# Patient Record
Sex: MALE | Race: WHITE | HISPANIC OR LATINO | Employment: UNEMPLOYED | URBAN - METROPOLITAN AREA
[De-identification: names, ages, dates, MRNs, and addresses within clinical notes are randomized per-mention and may not be internally consistent; named-entity substitution may affect disease eponyms.]

---

## 2024-04-06 ENCOUNTER — APPOINTMENT (OUTPATIENT)
Dept: CT IMAGING | Facility: HOSPITAL | Age: 54
End: 2024-04-06

## 2024-04-06 ENCOUNTER — HOSPITAL ENCOUNTER (INPATIENT)
Facility: HOSPITAL | Age: 54
LOS: 7 days | Discharge: HOME | End: 2024-04-17
Attending: EMERGENCY MEDICINE | Admitting: HOSPITALIST

## 2024-04-06 ENCOUNTER — APPOINTMENT (OUTPATIENT)
Dept: GENERAL RADIOLOGY | Facility: HOSPITAL | Age: 54
End: 2024-04-06

## 2024-04-06 DIAGNOSIS — I63.9 CEREBELLAR STROKE: ICD-10-CM

## 2024-04-06 DIAGNOSIS — F10.920 ALCOHOLIC INTOXICATION WITHOUT COMPLICATION: Primary | ICD-10-CM

## 2024-04-06 DIAGNOSIS — E87.29 HIGH ANION GAP METABOLIC ACIDOSIS: ICD-10-CM

## 2024-04-06 DIAGNOSIS — R74.01 TRANSAMINITIS: ICD-10-CM

## 2024-04-06 LAB
ALBUMIN SERPL-MCNC: 4.2 G/DL (ref 3.5–5.2)
ALBUMIN/GLOB SERPL: 1.4 G/DL
ALP SERPL-CCNC: 123 U/L (ref 39–117)
ALT SERPL W P-5'-P-CCNC: 47 U/L (ref 1–41)
AMPHET+METHAMPHET UR QL: NEGATIVE
ANION GAP SERPL CALCULATED.3IONS-SCNC: 19 MMOL/L (ref 5–15)
APTT PPP: 20.7 SECONDS (ref 22.7–35.4)
AST SERPL-CCNC: 42 U/L (ref 1–40)
BARBITURATES UR QL SCN: NEGATIVE
BASOPHILS # BLD AUTO: 0.07 10*3/MM3 (ref 0–0.2)
BASOPHILS NFR BLD AUTO: 0.6 % (ref 0–1.5)
BENZODIAZ UR QL SCN: NEGATIVE
BILIRUB SERPL-MCNC: <0.2 MG/DL (ref 0–1.2)
BILIRUB UR QL STRIP: NEGATIVE
BUN SERPL-MCNC: 7 MG/DL (ref 6–20)
BUN/CREAT SERPL: 9.3 (ref 7–25)
CALCIUM SPEC-SCNC: 8.1 MG/DL (ref 8.6–10.5)
CANNABINOIDS SERPL QL: NEGATIVE
CHLORIDE SERPL-SCNC: 104 MMOL/L (ref 98–107)
CLARITY UR: CLEAR
CO2 SERPL-SCNC: 16 MMOL/L (ref 22–29)
COCAINE UR QL: NEGATIVE
COLOR UR: YELLOW
CREAT SERPL-MCNC: 0.75 MG/DL (ref 0.76–1.27)
DEPRECATED RDW RBC AUTO: 43.4 FL (ref 37–54)
EGFRCR SERPLBLD CKD-EPI 2021: 111.3 ML/MIN/1.73
EOSINOPHIL # BLD AUTO: 0.26 10*3/MM3 (ref 0–0.4)
EOSINOPHIL NFR BLD AUTO: 2.4 % (ref 0.3–6.2)
ERYTHROCYTE [DISTWIDTH] IN BLOOD BY AUTOMATED COUNT: 12.5 % (ref 12.3–15.4)
ETHANOL BLD-MCNC: 170 MG/DL (ref 0–10)
ETHANOL UR QL: 0.17 %
FENTANYL UR-MCNC: NEGATIVE NG/ML
GLOBULIN UR ELPH-MCNC: 2.9 GM/DL
GLUCOSE SERPL-MCNC: 161 MG/DL (ref 65–99)
GLUCOSE UR STRIP-MCNC: NEGATIVE MG/DL
HCT VFR BLD AUTO: 42.4 % (ref 37.5–51)
HGB BLD-MCNC: 14.4 G/DL (ref 13–17.7)
HGB UR QL STRIP.AUTO: NEGATIVE
IMM GRANULOCYTES # BLD AUTO: 0.12 10*3/MM3 (ref 0–0.05)
IMM GRANULOCYTES NFR BLD AUTO: 1.1 % (ref 0–0.5)
INR PPP: 1.01 (ref 0.9–1.1)
KETONES UR QL STRIP: NEGATIVE
LEUKOCYTE ESTERASE UR QL STRIP.AUTO: NEGATIVE
LYMPHOCYTES # BLD AUTO: 4.21 10*3/MM3 (ref 0.7–3.1)
LYMPHOCYTES NFR BLD AUTO: 38.5 % (ref 19.6–45.3)
MCH RBC QN AUTO: 32.4 PG (ref 26.6–33)
MCHC RBC AUTO-ENTMCNC: 34 G/DL (ref 31.5–35.7)
MCV RBC AUTO: 95.5 FL (ref 79–97)
METHADONE UR QL SCN: NEGATIVE
MONOCYTES # BLD AUTO: 1.2 10*3/MM3 (ref 0.1–0.9)
MONOCYTES NFR BLD AUTO: 11 % (ref 5–12)
NEUTROPHILS NFR BLD AUTO: 46.4 % (ref 42.7–76)
NEUTROPHILS NFR BLD AUTO: 5.08 10*3/MM3 (ref 1.7–7)
NITRITE UR QL STRIP: NEGATIVE
NRBC BLD AUTO-RTO: 0 /100 WBC (ref 0–0.2)
OPIATES UR QL: NEGATIVE
OXYCODONE UR QL SCN: NEGATIVE
PH UR STRIP.AUTO: <=5 [PH] (ref 5–8)
PLATELET # BLD AUTO: 311 10*3/MM3 (ref 140–450)
PMV BLD AUTO: 8.9 FL (ref 6–12)
POTASSIUM SERPL-SCNC: 3.6 MMOL/L (ref 3.5–5.2)
PROT SERPL-MCNC: 7.1 G/DL (ref 6–8.5)
PROT UR QL STRIP: NEGATIVE
PROTHROMBIN TIME: 13.5 SECONDS (ref 11.7–14.2)
RBC # BLD AUTO: 4.44 10*6/MM3 (ref 4.14–5.8)
SODIUM SERPL-SCNC: 139 MMOL/L (ref 136–145)
SP GR UR STRIP: 1.01 (ref 1–1.03)
UROBILINOGEN UR QL STRIP: NORMAL
WBC NRBC COR # BLD AUTO: 10.94 10*3/MM3 (ref 3.4–10.8)

## 2024-04-06 PROCEDURE — 25010000002 DROPERIDOL PER 5 MG: Performed by: PHYSICIAN ASSISTANT

## 2024-04-06 PROCEDURE — 80053 COMPREHEN METABOLIC PANEL: CPT | Performed by: PHYSICIAN ASSISTANT

## 2024-04-06 PROCEDURE — 85610 PROTHROMBIN TIME: CPT | Performed by: PHYSICIAN ASSISTANT

## 2024-04-06 PROCEDURE — 99285 EMERGENCY DEPT VISIT HI MDM: CPT

## 2024-04-06 PROCEDURE — 80307 DRUG TEST PRSMV CHEM ANLYZR: CPT | Performed by: PHYSICIAN ASSISTANT

## 2024-04-06 PROCEDURE — 25010000002 ONDANSETRON PER 1 MG: Performed by: PHYSICIAN ASSISTANT

## 2024-04-06 PROCEDURE — 82077 ASSAY SPEC XCP UR&BREATH IA: CPT | Performed by: PHYSICIAN ASSISTANT

## 2024-04-06 PROCEDURE — 85730 THROMBOPLASTIN TIME PARTIAL: CPT | Performed by: PHYSICIAN ASSISTANT

## 2024-04-06 PROCEDURE — 70450 CT HEAD/BRAIN W/O DYE: CPT

## 2024-04-06 PROCEDURE — 81003 URINALYSIS AUTO W/O SCOPE: CPT | Performed by: PHYSICIAN ASSISTANT

## 2024-04-06 PROCEDURE — 25010000002 THIAMINE PER 100 MG: Performed by: PHYSICIAN ASSISTANT

## 2024-04-06 PROCEDURE — 25810000003 LACTATED RINGERS SOLUTION: Performed by: PHYSICIAN ASSISTANT

## 2024-04-06 PROCEDURE — 25010000002 ONDANSETRON PER 1 MG

## 2024-04-06 PROCEDURE — 82550 ASSAY OF CK (CPK): CPT | Performed by: PHYSICIAN ASSISTANT

## 2024-04-06 PROCEDURE — 84145 PROCALCITONIN (PCT): CPT | Performed by: EMERGENCY MEDICINE

## 2024-04-06 PROCEDURE — 71045 X-RAY EXAM CHEST 1 VIEW: CPT

## 2024-04-06 PROCEDURE — P9612 CATHETERIZE FOR URINE SPEC: HCPCS

## 2024-04-06 PROCEDURE — 85025 COMPLETE CBC W/AUTO DIFF WBC: CPT | Performed by: PHYSICIAN ASSISTANT

## 2024-04-06 RX ORDER — ONDANSETRON 2 MG/ML
4 INJECTION INTRAMUSCULAR; INTRAVENOUS ONCE
Status: COMPLETED | OUTPATIENT
Start: 2024-04-06 | End: 2024-04-06

## 2024-04-06 RX ORDER — ONDANSETRON 2 MG/ML
INJECTION INTRAMUSCULAR; INTRAVENOUS
Status: COMPLETED
Start: 2024-04-06 | End: 2024-04-06

## 2024-04-06 RX ORDER — THIAMINE HYDROCHLORIDE 100 MG/ML
100 INJECTION, SOLUTION INTRAMUSCULAR; INTRAVENOUS ONCE
Status: COMPLETED | OUTPATIENT
Start: 2024-04-06 | End: 2024-04-06

## 2024-04-06 RX ORDER — DROPERIDOL 2.5 MG/ML
1.25 INJECTION, SOLUTION INTRAMUSCULAR; INTRAVENOUS ONCE
Status: COMPLETED | OUTPATIENT
Start: 2024-04-06 | End: 2024-04-06

## 2024-04-06 RX ADMIN — FOLIC ACID 1 MG: 5 INJECTION, SOLUTION INTRAMUSCULAR; INTRAVENOUS; SUBCUTANEOUS at 22:23

## 2024-04-06 RX ADMIN — ONDANSETRON 4 MG: 2 INJECTION INTRAMUSCULAR; INTRAVENOUS at 22:16

## 2024-04-06 RX ADMIN — ONDANSETRON 4 MG: 2 INJECTION INTRAMUSCULAR; INTRAVENOUS at 21:34

## 2024-04-06 RX ADMIN — SODIUM CHLORIDE, POTASSIUM CHLORIDE, SODIUM LACTATE AND CALCIUM CHLORIDE 1000 ML: 600; 310; 30; 20 INJECTION, SOLUTION INTRAVENOUS at 21:38

## 2024-04-06 RX ADMIN — DROPERIDOL 1.25 MG: 2.5 INJECTION, SOLUTION INTRAMUSCULAR; INTRAVENOUS at 22:40

## 2024-04-06 RX ADMIN — THIAMINE HYDROCHLORIDE 100 MG: 100 INJECTION, SOLUTION INTRAMUSCULAR; INTRAVENOUS at 22:45

## 2024-04-07 PROBLEM — R74.01 TRANSAMINITIS: Status: ACTIVE | Noted: 2024-04-07

## 2024-04-07 PROBLEM — F10.929 ALCOHOL INTOXICATION: Status: ACTIVE | Noted: 2024-04-07

## 2024-04-07 LAB
CK SERPL-CCNC: 122 U/L (ref 20–200)
D-LACTATE SERPL-SCNC: 2.3 MMOL/L (ref 0.5–2)
D-LACTATE SERPL-SCNC: 3 MMOL/L (ref 0.5–2)
D-LACTATE SERPL-SCNC: 3.2 MMOL/L (ref 0.5–2)
D-LACTATE SERPL-SCNC: 3.8 MMOL/L (ref 0.5–2)
D-LACTATE SERPL-SCNC: 3.9 MMOL/L (ref 0.5–2)
PROCALCITONIN SERPL-MCNC: 0.06 NG/ML (ref 0–0.25)

## 2024-04-07 PROCEDURE — G0378 HOSPITAL OBSERVATION PER HR: HCPCS

## 2024-04-07 PROCEDURE — 36415 COLL VENOUS BLD VENIPUNCTURE: CPT | Performed by: EMERGENCY MEDICINE

## 2024-04-07 PROCEDURE — 25810000003 SODIUM CHLORIDE 0.9 % SOLUTION: Performed by: PHYSICIAN ASSISTANT

## 2024-04-07 PROCEDURE — 83605 ASSAY OF LACTIC ACID: CPT | Performed by: EMERGENCY MEDICINE

## 2024-04-07 PROCEDURE — 25010000002 VANCOMYCIN 10 G RECONSTITUTED SOLUTION: Performed by: EMERGENCY MEDICINE

## 2024-04-07 PROCEDURE — 25810000003 SODIUM CHLORIDE 0.9 % SOLUTION: Performed by: EMERGENCY MEDICINE

## 2024-04-07 PROCEDURE — 25010000002 CEFTRIAXONE PER 250 MG: Performed by: EMERGENCY MEDICINE

## 2024-04-07 PROCEDURE — 25810000003 LACTATED RINGERS PER 1000 ML: Performed by: NURSE PRACTITIONER

## 2024-04-07 PROCEDURE — 25010000002 THIAMINE PER 100 MG: Performed by: NURSE PRACTITIONER

## 2024-04-07 PROCEDURE — 25810000003 LACTATED RINGERS SOLUTION: Performed by: NURSE PRACTITIONER

## 2024-04-07 PROCEDURE — 36415 COLL VENOUS BLD VENIPUNCTURE: CPT

## 2024-04-07 PROCEDURE — 25010000002 THIAMINE HCL 200 MG/2ML SOLUTION: Performed by: NURSE PRACTITIONER

## 2024-04-07 RX ORDER — BISACODYL 10 MG
10 SUPPOSITORY, RECTAL RECTAL DAILY PRN
Status: DISCONTINUED | OUTPATIENT
Start: 2024-04-07 | End: 2024-04-17 | Stop reason: HOSPADM

## 2024-04-07 RX ORDER — ALUMINA, MAGNESIA, AND SIMETHICONE 2400; 2400; 240 MG/30ML; MG/30ML; MG/30ML
15 SUSPENSION ORAL EVERY 6 HOURS PRN
Status: DISCONTINUED | OUTPATIENT
Start: 2024-04-07 | End: 2024-04-17 | Stop reason: HOSPADM

## 2024-04-07 RX ORDER — POLYETHYLENE GLYCOL 3350 17 G/17G
17 POWDER, FOR SOLUTION ORAL DAILY PRN
Status: DISCONTINUED | OUTPATIENT
Start: 2024-04-07 | End: 2024-04-17 | Stop reason: HOSPADM

## 2024-04-07 RX ORDER — LORAZEPAM 2 MG/ML
1 INJECTION INTRAMUSCULAR
Status: DISCONTINUED | OUTPATIENT
Start: 2024-04-07 | End: 2024-04-09

## 2024-04-07 RX ORDER — CLONIDINE HYDROCHLORIDE 0.1 MG/1
0.1 TABLET ORAL EVERY 4 HOURS PRN
Status: DISCONTINUED | OUTPATIENT
Start: 2024-04-07 | End: 2024-04-17 | Stop reason: HOSPADM

## 2024-04-07 RX ORDER — THIAMINE HYDROCHLORIDE 100 MG/ML
200 INJECTION, SOLUTION INTRAMUSCULAR; INTRAVENOUS EVERY 8 HOURS SCHEDULED
Status: DISPENSED | OUTPATIENT
Start: 2024-04-07 | End: 2024-04-12

## 2024-04-07 RX ORDER — LORAZEPAM 1 MG/1
1 TABLET ORAL
Status: DISCONTINUED | OUTPATIENT
Start: 2024-04-07 | End: 2024-04-09

## 2024-04-07 RX ORDER — LORAZEPAM 1 MG/1
2 TABLET ORAL
Status: DISCONTINUED | OUTPATIENT
Start: 2024-04-07 | End: 2024-04-09

## 2024-04-07 RX ORDER — ONDANSETRON 4 MG/1
4 TABLET, ORALLY DISINTEGRATING ORAL EVERY 6 HOURS PRN
Status: DISCONTINUED | OUTPATIENT
Start: 2024-04-07 | End: 2024-04-17 | Stop reason: HOSPADM

## 2024-04-07 RX ORDER — LORAZEPAM 2 MG/ML
2 INJECTION INTRAMUSCULAR
Status: DISCONTINUED | OUTPATIENT
Start: 2024-04-07 | End: 2024-04-09

## 2024-04-07 RX ORDER — ONDANSETRON 2 MG/ML
4 INJECTION INTRAMUSCULAR; INTRAVENOUS EVERY 6 HOURS PRN
Status: DISCONTINUED | OUTPATIENT
Start: 2024-04-07 | End: 2024-04-17 | Stop reason: HOSPADM

## 2024-04-07 RX ORDER — NITROGLYCERIN 0.4 MG/1
0.4 TABLET SUBLINGUAL
Status: DISCONTINUED | OUTPATIENT
Start: 2024-04-07 | End: 2024-04-17 | Stop reason: HOSPADM

## 2024-04-07 RX ORDER — FOLIC ACID 1 MG/1
1 TABLET ORAL DAILY
Status: DISCONTINUED | OUTPATIENT
Start: 2024-04-07 | End: 2024-04-17 | Stop reason: HOSPADM

## 2024-04-07 RX ORDER — AMOXICILLIN 250 MG
2 CAPSULE ORAL 2 TIMES DAILY PRN
Status: DISCONTINUED | OUTPATIENT
Start: 2024-04-07 | End: 2024-04-17 | Stop reason: HOSPADM

## 2024-04-07 RX ORDER — BISACODYL 5 MG/1
5 TABLET, DELAYED RELEASE ORAL DAILY PRN
Status: DISCONTINUED | OUTPATIENT
Start: 2024-04-07 | End: 2024-04-17 | Stop reason: HOSPADM

## 2024-04-07 RX ORDER — SODIUM CHLORIDE 0.9 % (FLUSH) 0.9 %
10 SYRINGE (ML) INJECTION AS NEEDED
Status: DISCONTINUED | OUTPATIENT
Start: 2024-04-07 | End: 2024-04-17 | Stop reason: HOSPADM

## 2024-04-07 RX ORDER — MULTIPLE VITAMINS W/ MINERALS TAB 9MG-400MCG
1 TAB ORAL DAILY
Status: DISCONTINUED | OUTPATIENT
Start: 2024-04-07 | End: 2024-04-17 | Stop reason: HOSPADM

## 2024-04-07 RX ORDER — SODIUM CHLORIDE, SODIUM LACTATE, POTASSIUM CHLORIDE, CALCIUM CHLORIDE 600; 310; 30; 20 MG/100ML; MG/100ML; MG/100ML; MG/100ML
75 INJECTION, SOLUTION INTRAVENOUS CONTINUOUS
Status: DISCONTINUED | OUTPATIENT
Start: 2024-04-07 | End: 2024-04-10

## 2024-04-07 RX ADMIN — THIAMINE HYDROCHLORIDE 200 MG: 100 INJECTION, SOLUTION INTRAMUSCULAR; INTRAVENOUS at 16:09

## 2024-04-07 RX ADMIN — CEFTRIAXONE 2000 MG: 2 INJECTION, POWDER, FOR SOLUTION INTRAMUSCULAR; INTRAVENOUS at 05:12

## 2024-04-07 RX ADMIN — SODIUM CHLORIDE, POTASSIUM CHLORIDE, SODIUM LACTATE AND CALCIUM CHLORIDE 100 ML/HR: 600; 310; 30; 20 INJECTION, SOLUTION INTRAVENOUS at 09:50

## 2024-04-07 RX ADMIN — SODIUM CHLORIDE, POTASSIUM CHLORIDE, SODIUM LACTATE AND CALCIUM CHLORIDE 500 ML: 600; 310; 30; 20 INJECTION, SOLUTION INTRAVENOUS at 22:15

## 2024-04-07 RX ADMIN — THIAMINE HYDROCHLORIDE 200 MG: 100 INJECTION, SOLUTION INTRAMUSCULAR; INTRAVENOUS at 22:00

## 2024-04-07 RX ADMIN — VANCOMYCIN HYDROCHLORIDE 1750 MG: 10 INJECTION, POWDER, LYOPHILIZED, FOR SOLUTION INTRAVENOUS at 05:49

## 2024-04-07 RX ADMIN — SODIUM CHLORIDE 1000 ML: 9 INJECTION, SOLUTION INTRAVENOUS at 05:57

## 2024-04-07 NOTE — ED NOTES
Nursing report ED to floor  Stephane Jaimes  53 y.o.  male    HPI :  HPI (Adult)  Stated Reason for Visit: Alcohol intoxication. EMS reports pt was found unresponsive in his car in a parking lot. EMS reports 2 mg NArcan given at 20:49 PTA.  History Obtained From: EMS  Precipitating Event(s): alcohol ingestion    Chief Complaint  Chief Complaint   Patient presents with    Alcohol Intoxication       Admitting doctor:   Warren Bajwa MD    Admitting diagnosis:   The primary encounter diagnosis was Alcoholic intoxication without complication. Diagnoses of Transaminitis and High anion gap metabolic acidosis were also pertinent to this visit.    Code status:   Current Code Status       Date Active Code Status Order ID Comments User Context       Not on file            Allergies:   Patient has no known allergies.    Isolation:   No active isolations    Intake and Output  No intake or output data in the 24 hours ending 04/07/24 0514    Weight:       04/06/24 2119   Weight: 92.5 kg (204 lb)       Most recent vitals:   Vitals:    04/07/24 0101 04/07/24 0303 04/07/24 0331 04/07/24 0430   BP: 130/75 157/91 151/97 159/99   BP Location:    Right arm   Patient Position:    Lying   Pulse: 69 75 75 76   Resp:    18   Temp:       TempSrc:       SpO2: 98% 100% 98% 98%   Weight:       Height:           Active LDAs/IV Access:   Lines, Drains & Airways       Active LDAs       Name Placement date Placement time Site Days    Peripheral IV 04/06/24 2129 Anterior;Distal;Left;Upper Arm 04/06/24 2129  Arm  less than 1    Peripheral IV 04/06/24 2135 Right Antecubital 04/06/24 2135  Antecubital  less than 1                    Labs (abnormal labs have a star):   Labs Reviewed   COMPREHENSIVE METABOLIC PANEL - Abnormal; Notable for the following components:       Result Value    Glucose 161 (*)     Creatinine 0.75 (*)     CO2 16.0 (*)     Calcium 8.1 (*)     ALT (SGPT) 47 (*)     AST (SGOT) 42 (*)     Alkaline Phosphatase 123 (*)     Anion Gap  19.0 (*)     All other components within normal limits    Narrative:     GFR Normal >60  Chronic Kidney Disease <60  Kidney Failure <15     APTT - Abnormal; Notable for the following components:    PTT 20.7 (*)     All other components within normal limits    Narrative:     No clot detected.     ETHANOL - Abnormal; Notable for the following components:    Ethanol 170 (*)     All other components within normal limits   CBC WITH AUTO DIFFERENTIAL - Abnormal; Notable for the following components:    WBC 10.94 (*)     Immature Grans % 1.1 (*)     Lymphocytes, Absolute 4.21 (*)     Monocytes, Absolute 1.20 (*)     Immature Grans, Absolute 0.12 (*)     All other components within normal limits   PROTIME-INR - Normal   URINALYSIS W/ MICROSCOPIC IF INDICATED (NO CULTURE) - Normal    Narrative:     Urine microscopic not indicated.   URINE DRUG SCREEN - Normal    Narrative:     Negative Thresholds Per Drugs Screened:    Amphetamines                 500 ng/ml  Barbiturates                 200 ng/ml  Benzodiazepines              100 ng/ml  Cocaine                      300 ng/ml  Methadone                    300 ng/ml  Opiates                      300 ng/ml  Oxycodone                    100 ng/ml  THC                           50 ng/ml  Fentanyl                       5 ng/ml      The Normal Value for all drugs tested is negative. This report includes final unconfirmed screening results to be used for medical treatment purposes only. Unconfirmed results must not be used for non-medical purposes such as employment or legal testing. Clinical consideration should be applied to any drug of abuse test, particularly when unconfirmed results are used.           PROCALCITONIN - Normal    Narrative:     As a Marker for Sepsis (Non-Neonates):    1. <0.5 ng/mL represents a low risk of severe sepsis and/or septic shock.  2. >2 ng/mL represents a high risk of severe sepsis and/or septic shock.    As a Marker for Lower Respiratory Tract  "Infections that require antibiotic therapy:    PCT on Admission    Antibiotic Therapy       6-12 Hrs later    >0.5                Strongly Recommended  >0.25 - <0.5        Recommended   0.1 - 0.25          Discouraged              Remeasure/reassess PCT  <0.1                Strongly Discouraged     Remeasure/reassess PCT    As 28 day mortality risk marker: \"Change in Procalcitonin Result\" (>80% or <=80%) if Day 0 (or Day 1) and Day 4 values are available. Refer to http://www.[facility]-pct-calculator.com    Change in PCT <=80%  A decrease of PCT levels below or equal to 80% defines a positive change in PCT test result representing a higher risk for 28-day all-cause mortality of patients diagnosed with severe sepsis for septic shock.    Change in PCT >80%  A decrease of PCT levels of more than 80% defines a negative change in PCT result representing a lower risk for 28-day all-cause mortality of patients diagnosed with severe sepsis or septic shock.      LACTIC ACID, PLASMA   CBC AND DIFFERENTIAL    Narrative:     The following orders were created for panel order CBC & Differential.  Procedure                               Abnormality         Status                     ---------                               -----------         ------                     CBC Auto Differential[[id number]]        Abnormal            Final result                 Please view results for these tests on the individual orders.       EKG:   No orders to display       Meds given in ED:   Medications   cefTRIAXone (ROCEPHIN) 2,000 mg in sodium chloride 0.9 % 100 mL MBP (2,000 mg Intravenous New Bag 4/7/24 0512)   vancomycin 1750 mg/500 mL 0.9% NS IVPB ([facility]) (has no administration in time range)   ondansetron (ZOFRAN) injection 4 mg (4 mg Intravenous Not Given 4/6/24 6594)   lactated ringers bolus 1,000 mL (0 mL Intravenous Stopped 4/6/24 2301)   thiamine (B-1) injection 100 mg (100 mg Intravenous Given 4/6/24 2245)   folic acid 1 mg in sodium " chloride 0.9 % 50 mL IVPB (0 mg Intravenous Stopped 4/6/24 2301)   ondansetron (ZOFRAN) injection 4 mg (4 mg Intravenous Given 4/6/24 2216)   droperidol (INAPSINE) injection 1.25 mg (1.25 mg Intravenous Given 4/6/24 2240)       Imaging results:  CT Head Without Contrast    Result Date: 4/6/2024  Electronically signed by Alena Mitchell M.D. on 04-06-24 at 2307    XR Chest 1 View    Result Date: 4/6/2024  FINDINGS AND IMPRESSION: Lungs are hypoinflated. Pulmonary vascular congestion is present. Cardiac silhouette is accentuated by low lung volumes. There is bibasilar pulmonary opacification suggestive of atelectasis, pneumonia and/or pulmonary edema in the appropriate clinical context correlation with patient history is recommended follow-up chest CT if clinically indicated. No pneumothorax is seen..  This report was finalized on 4/6/2024 10:59 PM by Dr. Stephen De La Cruz M.D on Workstation: [id number]       Ambulatory status:   - assist Xs 2    Social issues:   Social History     Socioeconomic History    Marital status: Single       Peripheral Neurovascular  Peripheral Neurovascular (Adult)  Peripheral Neurovascular WDL: WDL, pulse assessment  Pulse Assessment: radial    Neuro Cognitive  Neuro Cognitive (Adult)  Cognitive/Neuro/Behavioral WDL: .WDL except, arousability, level of consciousness, orientation  Level of Consciousness: Responds to Pain  Arousal Level: arouses to vigorous stimulation  Orientation: disoriented x 4  Additional Documentation: Hand /Ankle Strength (Group)  Hand /Ankle Strength  Hand , Left: strong  Hand , Right: strong  Dorsiflexion, Left: strong  Dorsiflexion, Right: strong  Plantarflexion, Left: strong  Plantarflexion, Right: strong    Learning  Learning Assessment (Adult)  Learning Readiness and Ability: cognitive limitation noted, language barrier identified    Respiratory  Respiratory (Adult)  Airway WDL: WDL  Respiratory WDL  Respiratory WDL: WDL  Rhythm/Pattern,  Respiratory: no shortness of breath reported, depth regular, pattern regular, unlabored    Abdominal Pain       Pain Assessments  Pain (Adult)  Preferred Pain Scale: FACES (Castillo-Baker FACES Pain Rating Scale)  FACES Pain Rating: Rest: 0-->no hurt  FACES Pain Rating: Activity: 0-->no hurt    NIH Stroke Scale       Patricia Araujo RN  04/07/24 05:14 EDT
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
## 2024-04-07 NOTE — ED TRIAGE NOTES
EMS reports pt was found unresponsive in his car in a parking lot. EMS reports 2 mg Narcan given at 20:49 PTA. EMS reports pt may have aspirated emesis.

## 2024-04-07 NOTE — NURSING NOTE
Access Center attempted to complete evaluation. The patient's nurse reported the patient is not appropriate at this time. The patient's nurse reported the patient is drowsy/lethargic and is only able to be aroused briefly before falling back asleep. Access center will try back later.
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
## 2024-04-07 NOTE — NURSING NOTE
Access Center checked back on patient. The patient's nurse reported the patient is still not appropriate and to check back tomorrow.

## 2024-04-07 NOTE — ED NOTES
Patient started to vomit and his girlfriend was afraid that the patient was choking and/or having a stroke. HUNTER Ansari, at bedside. Staff Assist button pushed and multiple staff responded. Suction was available. Patient did vomit a small amount. No decrease in patient's SpO2. Patient did have a difficult time staying awake.

## 2024-04-08 PROBLEM — I10 HTN (HYPERTENSION): Status: ACTIVE | Noted: 2024-04-08

## 2024-04-08 LAB
AMMONIA BLD-SCNC: 40 UMOL/L (ref 16–60)
ANION GAP SERPL CALCULATED.3IONS-SCNC: 11 MMOL/L (ref 5–15)
B PARAPERT DNA SPEC QL NAA+PROBE: NOT DETECTED
B PERT DNA SPEC QL NAA+PROBE: NOT DETECTED
BASOPHILS # BLD MANUAL: 0.24 10*3/MM3 (ref 0–0.2)
BASOPHILS NFR BLD MANUAL: 2 % (ref 0–1.5)
BUN SERPL-MCNC: 8 MG/DL (ref 6–20)
BUN/CREAT SERPL: 11 (ref 7–25)
C PNEUM DNA NPH QL NAA+NON-PROBE: NOT DETECTED
CALCIUM SPEC-SCNC: 8.2 MG/DL (ref 8.6–10.5)
CHLORIDE SERPL-SCNC: 102 MMOL/L (ref 98–107)
CO2 SERPL-SCNC: 23 MMOL/L (ref 22–29)
CREAT SERPL-MCNC: 0.73 MG/DL (ref 0.76–1.27)
D-LACTATE SERPL-SCNC: 2.2 MMOL/L (ref 0.5–2)
DEPRECATED RDW RBC AUTO: 47.7 FL (ref 37–54)
EGFRCR SERPLBLD CKD-EPI 2021: 110.2 ML/MIN/1.73
ERYTHROCYTE [DISTWIDTH] IN BLOOD BY AUTOMATED COUNT: 12.9 % (ref 12.3–15.4)
FLUAV SUBTYP SPEC NAA+PROBE: NOT DETECTED
FLUBV RNA ISLT QL NAA+PROBE: NOT DETECTED
GLUCOSE SERPL-MCNC: 126 MG/DL (ref 65–99)
HADV DNA SPEC NAA+PROBE: NOT DETECTED
HCOV 229E RNA SPEC QL NAA+PROBE: NOT DETECTED
HCOV HKU1 RNA SPEC QL NAA+PROBE: NOT DETECTED
HCOV NL63 RNA SPEC QL NAA+PROBE: NOT DETECTED
HCOV OC43 RNA SPEC QL NAA+PROBE: NOT DETECTED
HCT VFR BLD AUTO: 42.7 % (ref 37.5–51)
HGB BLD-MCNC: 14.7 G/DL (ref 13–17.7)
HMPV RNA NPH QL NAA+NON-PROBE: NOT DETECTED
HPIV1 RNA ISLT QL NAA+PROBE: NOT DETECTED
HPIV2 RNA SPEC QL NAA+PROBE: NOT DETECTED
HPIV3 RNA NPH QL NAA+PROBE: NOT DETECTED
HPIV4 P GENE NPH QL NAA+PROBE: NOT DETECTED
LYMPHOCYTES # BLD MANUAL: 1.58 10*3/MM3 (ref 0.7–3.1)
LYMPHOCYTES NFR BLD MANUAL: 2 % (ref 5–12)
M PNEUMO IGG SER IA-ACNC: NOT DETECTED
MCH RBC QN AUTO: 34.1 PG (ref 26.6–33)
MCHC RBC AUTO-ENTMCNC: 34.4 G/DL (ref 31.5–35.7)
MCV RBC AUTO: 99.1 FL (ref 79–97)
MONOCYTES # BLD: 0.24 10*3/MM3 (ref 0.1–0.9)
MRSA DNA SPEC QL NAA+PROBE: NORMAL
NEUTROPHILS # BLD AUTO: 9.99 10*3/MM3 (ref 1.7–7)
NEUTROPHILS NFR BLD MANUAL: 82.8 % (ref 42.7–76)
NRBC BLD AUTO-RTO: 0 /100 WBC (ref 0–0.2)
PLAT MORPH BLD: NORMAL
PLATELET # BLD AUTO: 263 10*3/MM3 (ref 140–450)
PMV BLD AUTO: 8.8 FL (ref 6–12)
POTASSIUM SERPL-SCNC: 3.8 MMOL/L (ref 3.5–5.2)
RBC # BLD AUTO: 4.31 10*6/MM3 (ref 4.14–5.8)
RBC MORPH BLD: NORMAL
RHINOVIRUS RNA SPEC NAA+PROBE: NOT DETECTED
RSV RNA NPH QL NAA+NON-PROBE: NOT DETECTED
SARS-COV-2 RNA NPH QL NAA+NON-PROBE: NOT DETECTED
SODIUM SERPL-SCNC: 136 MMOL/L (ref 136–145)
VARIANT LYMPHS NFR BLD MANUAL: 13.1 % (ref 19.6–45.3)
WBC MORPH BLD: NORMAL
WBC NRBC COR # BLD AUTO: 12.07 10*3/MM3 (ref 3.4–10.8)

## 2024-04-08 PROCEDURE — 25010000002 THIAMINE HCL 200 MG/2ML SOLUTION: Performed by: NURSE PRACTITIONER

## 2024-04-08 PROCEDURE — 80048 BASIC METABOLIC PNL TOTAL CA: CPT | Performed by: HOSPITALIST

## 2024-04-08 PROCEDURE — G0378 HOSPITAL OBSERVATION PER HR: HCPCS

## 2024-04-08 PROCEDURE — 85007 BL SMEAR W/DIFF WBC COUNT: CPT | Performed by: HOSPITALIST

## 2024-04-08 PROCEDURE — 83605 ASSAY OF LACTIC ACID: CPT | Performed by: EMERGENCY MEDICINE

## 2024-04-08 PROCEDURE — 25010000002 LORAZEPAM PER 2 MG: Performed by: NURSE PRACTITIONER

## 2024-04-08 PROCEDURE — 87641 MR-STAPH DNA AMP PROBE: CPT | Performed by: HOSPITALIST

## 2024-04-08 PROCEDURE — 25010000002 CEFTRIAXONE PER 250 MG: Performed by: HOSPITALIST

## 2024-04-08 PROCEDURE — 85025 COMPLETE CBC W/AUTO DIFF WBC: CPT | Performed by: HOSPITALIST

## 2024-04-08 PROCEDURE — 25810000003 LACTATED RINGERS PER 1000 ML: Performed by: NURSE PRACTITIONER

## 2024-04-08 PROCEDURE — 82140 ASSAY OF AMMONIA: CPT | Performed by: NURSE PRACTITIONER

## 2024-04-08 PROCEDURE — 0202U NFCT DS 22 TRGT SARS-COV-2: CPT | Performed by: NURSE PRACTITIONER

## 2024-04-08 PROCEDURE — 90791 PSYCH DIAGNOSTIC EVALUATION: CPT

## 2024-04-08 PROCEDURE — 25010000002 HYDRALAZINE PER 20 MG: Performed by: HOSPITALIST

## 2024-04-08 RX ORDER — AMLODIPINE BESYLATE 5 MG/1
5 TABLET ORAL
Status: DISCONTINUED | OUTPATIENT
Start: 2024-04-08 | End: 2024-04-08

## 2024-04-08 RX ORDER — AMLODIPINE BESYLATE 10 MG/1
10 TABLET ORAL
Status: DISCONTINUED | OUTPATIENT
Start: 2024-04-09 | End: 2024-04-17 | Stop reason: HOSPADM

## 2024-04-08 RX ORDER — HYDRALAZINE HYDROCHLORIDE 20 MG/ML
10 INJECTION INTRAMUSCULAR; INTRAVENOUS EVERY 6 HOURS PRN
Status: DISCONTINUED | OUTPATIENT
Start: 2024-04-08 | End: 2024-04-17 | Stop reason: HOSPADM

## 2024-04-08 RX ORDER — ACETAMINOPHEN 325 MG/1
650 TABLET ORAL ONCE
Status: COMPLETED | OUTPATIENT
Start: 2024-04-08 | End: 2024-04-08

## 2024-04-08 RX ADMIN — Medication 1 TABLET: at 08:31

## 2024-04-08 RX ADMIN — HYDRALAZINE HYDROCHLORIDE 10 MG: 20 INJECTION INTRAMUSCULAR; INTRAVENOUS at 23:58

## 2024-04-08 RX ADMIN — THIAMINE HYDROCHLORIDE 200 MG: 100 INJECTION, SOLUTION INTRAMUSCULAR; INTRAVENOUS at 21:02

## 2024-04-08 RX ADMIN — CLONIDINE HYDROCHLORIDE 0.1 MG: 0.1 TABLET ORAL at 01:15

## 2024-04-08 RX ADMIN — CLONIDINE HYDROCHLORIDE 0.1 MG: 0.1 TABLET ORAL at 20:39

## 2024-04-08 RX ADMIN — CEFTRIAXONE 2000 MG: 2 INJECTION, POWDER, FOR SOLUTION INTRAMUSCULAR; INTRAVENOUS at 08:31

## 2024-04-08 RX ADMIN — SODIUM CHLORIDE, POTASSIUM CHLORIDE, SODIUM LACTATE AND CALCIUM CHLORIDE 75 ML/HR: 600; 310; 30; 20 INJECTION, SOLUTION INTRAVENOUS at 23:58

## 2024-04-08 RX ADMIN — CLONIDINE HYDROCHLORIDE 0.1 MG: 0.1 TABLET ORAL at 09:47

## 2024-04-08 RX ADMIN — HYDRALAZINE HYDROCHLORIDE 10 MG: 20 INJECTION INTRAMUSCULAR; INTRAVENOUS at 17:34

## 2024-04-08 RX ADMIN — AMLODIPINE BESYLATE 5 MG: 10 TABLET ORAL at 10:01

## 2024-04-08 RX ADMIN — THIAMINE HYDROCHLORIDE 200 MG: 100 INJECTION, SOLUTION INTRAMUSCULAR; INTRAVENOUS at 14:44

## 2024-04-08 RX ADMIN — THIAMINE HYDROCHLORIDE 200 MG: 100 INJECTION, SOLUTION INTRAMUSCULAR; INTRAVENOUS at 08:31

## 2024-04-08 RX ADMIN — FOLIC ACID 1 MG: 1 TABLET ORAL at 08:31

## 2024-04-08 RX ADMIN — LORAZEPAM 2 MG: 2 INJECTION INTRAMUSCULAR; INTRAVENOUS at 12:21

## 2024-04-08 RX ADMIN — ACETAMINOPHEN 325MG 650 MG: 325 TABLET ORAL at 00:59

## 2024-04-08 NOTE — PLAN OF CARE
Goal Outcome Evaluation:  Plan of Care Reviewed With: patient        Progress: no change  Outcome Evaluation: pt aox3/4, on room air, assist x1, hypertensive-- meds given per mar, last CIWA 6, vss, LR running at 125ml/hr, IV abx given per mar, pt resting in bed at this time

## 2024-04-08 NOTE — PLAN OF CARE
Goal Outcome Evaluation:      Pt resting in bed. New orders noted for elevated lactate. Medicated for h/a and htn per MAR. No other c/o voiced. VSS No s/s of distress

## 2024-04-08 NOTE — PROGRESS NOTES
"DAILY PROGRESS NOTE  [facility]    Patient Identification:  Name: Stephane Jaimes  Age: 53 y.o.  Sex: male  :  1970  MRN: [id number]         Primary Care Physician: System, Provider Not In    Subjective:  Interval History: He is sleepy today.  He got some Ativan according to the nurse but withdrawal symptoms are not really bad.    Objective:    Scheduled Meds:amLODIPine, 5 mg, Oral, Q24H  cefTRIAXone, 2,000 mg, Intravenous, Daily  folic acid, 1 mg, Oral, Daily  multivitamin with minerals, 1 tablet, Oral, Daily  thiamine (B-1) IV, 200 mg, Intravenous, Q8H   Followed by  [START ON 2024] thiamine, 100 mg, Oral, Daily      Continuous Infusions:lactated ringers, 125 mL/hr, Last Rate: 125 mL/hr (24 2230)        Vital signs in last 24 hours:  Temp:  [98 °F (36.7 °C)-98.2 °F (36.8 °C)] 98.2 °F (36.8 °C)  Heart Rate:  [59-72] 68  Resp:  [18] 18  BP: (177-207)/() 179/106    Intake/Output:    Intake/Output Summary (Last 24 hours) at 2024 1639  Last data filed at 2024 1300  Gross per 24 hour   Intake --   Output 3700 ml   Net -3700 ml       Exam:  BP (!) 179/106 (BP Location: Right arm, Patient Position: Lying)   Pulse 68   Temp 98.2 °F (36.8 °C)   Resp 18   Ht 170.2 cm (67\")   Wt 91.4 kg (201 lb 8 oz)   SpO2 100%   BMI 31.56 kg/m²     General Appearance:  Sleepy, no distress   Head:    Normocephalic, without obvious abnormality, atraumatic   Eyes:       Throat:   Lips, tongue, gums normal   Neck:   Supple, symmetrical, trachea midline, no JVD   Lungs:     Clear to auscultation bilaterally, respirations unlabored   Chest Wall:    No tenderness or deformity    Heart:    Regular rate and rhythm, S1 and S2 normal, no murmur,no  Rub or gallop   Abdomen:     Soft, nontender, bowel sounds active, no masses, no organomegaly    Extremities:   Extremities normal, atraumatic, no cyanosis or edema   Pulses:      Skin:   Skin is warm and dry,  no rashes or palpable lesions   Neurologic:    " Sleepy      Lab Results (last 72 hours)       Procedure Component Value Units Date/Time    MRSA Screen, PCR (Inpatient) - Swab, Nares [[id number]]  (Normal) Collected: 04/08/24 1042    Specimen: Swab from Nares Updated: 04/08/24 1206     MRSA PCR No MRSA Detected    Narrative:      The negative predictive value of this diagnostic test is high and should only be used to consider de-escalating anti-MRSA therapy. A positive result may indicate colonization with MRSA and must be correlated clinically.    Manual Differential [[id number]]  (Abnormal) Collected: 04/08/24 0227    Specimen: Blood Updated: 04/08/24 0302     Neutrophil % 82.8 %      Lymphocyte % 13.1 %      Monocyte % 2.0 %      Basophil % 2.0 %      Neutrophils Absolute 9.99 10*3/mm3      Lymphocytes Absolute 1.58 10*3/mm3      Monocytes Absolute 0.24 10*3/mm3      Basophils Absolute 0.24 10*3/mm3      RBC Morphology Normal     WBC Morphology Normal     Platelet Morphology Normal    STAT Lactic Acid, Reflex [[id number]]  (Abnormal) Collected: 04/08/24 0227    Specimen: Blood Updated: 04/08/24 0255     Lactate 2.2 mmol/L     Basic Metabolic Panel [[id number]]  (Abnormal) Collected: 04/08/24 0227    Specimen: Blood Updated: 04/08/24 0255     Glucose 126 mg/dL      BUN 8 mg/dL      Creatinine 0.73 mg/dL      Sodium 136 mmol/L      Potassium 3.8 mmol/L      Chloride 102 mmol/L      CO2 23.0 mmol/L      Calcium 8.2 mg/dL      BUN/Creatinine Ratio 11.0     Anion Gap 11.0 mmol/L      eGFR 110.2 mL/min/1.73     Narrative:      GFR Normal >60  Chronic Kidney Disease <60  Kidney Failure <15      CBC & Differential [[id number]]  (Abnormal) Collected: 04/08/24 0227    Specimen: Blood Updated: 04/08/24 0241    Narrative:      The following orders were created for panel order CBC & Differential.  Procedure                               Abnormality         Status                     ---------                               -----------         ------                     CBC Auto  Differential[[id number]]        Abnormal            Final result                 Please view results for these tests on the individual orders.    CBC Auto Differential [[id number]]  (Abnormal) Collected: 04/08/24 0227    Specimen: Blood Updated: 04/08/24 0241     WBC 12.07 10*3/mm3      RBC 4.31 10*6/mm3      Hemoglobin 14.7 g/dL      Hematocrit 42.7 %      MCV 99.1 fL      MCH 34.1 pg      MCHC 34.4 g/dL      RDW 12.9 %      RDW-SD 47.7 fl      MPV 8.8 fL      Platelets 263 10*3/mm3      nRBC 0.0 /100 WBC     STAT Lactic Acid, Reflex [[id number]]  (Abnormal) Collected: 04/07/24 2016    Specimen: Blood from Hand, Right Updated: 04/07/24 2055     Lactate 3.2 mmol/L     STAT Lactic Acid, Reflex [[id number]]  (Abnormal) Collected: 04/07/24 1421    Specimen: Blood from Arm, Right Updated: 04/07/24 1452     Lactate 2.3 mmol/L     STAT Lactic Acid, Reflex [[id number]]  (Abnormal) Collected: 04/07/24 1114    Specimen: Blood Updated: 04/07/24 1211     Lactate 3.0 mmol/L     STAT Lactic Acid, Reflex [[id number]]  (Abnormal) Collected: 04/07/24 0806    Specimen: Blood Updated: 04/07/24 0905     Lactate 3.8 mmol/L     CK [[id number]]  (Normal) Collected: 04/06/24 2133    Specimen: Blood Updated: 04/07/24 0556     Creatine Kinase 122 U/L     Lactic Acid, Plasma [[id number]]  (Abnormal) Collected: 04/07/24 0510    Specimen: Blood Updated: 04/07/24 0540     Lactate 3.9 mmol/L     Procalcitonin [[id number]]  (Normal) Collected: 04/06/24 2133    Specimen: Blood Updated: 04/07/24 0502     Procalcitonin 0.06 ng/mL     Narrative:      As a Marker for Sepsis (Non-Neonates):    1. <0.5 ng/mL represents a low risk of severe sepsis and/or septic shock.  2. >2 ng/mL represents a high risk of severe sepsis and/or septic shock.    As a Marker for Lower Respiratory Tract Infections that require antibiotic therapy:    PCT on Admission    Antibiotic Therapy       6-12 Hrs later    >0.5                Strongly Recommended  >0.25 - <0.5        Recommended  "  0.1 - 0.25          Discouraged              Remeasure/reassess PCT  <0.1                Strongly Discouraged     Remeasure/reassess PCT    As 28 day mortality risk marker: \"Change in Procalcitonin Result\" (>80% or <=80%) if Day 0 (or Day 1) and Day 4 values are available. Refer to http://www.[facility]-pct-calculator.com    Change in PCT <=80%  A decrease of PCT levels below or equal to 80% defines a positive change in PCT test result representing a higher risk for 28-day all-cause mortality of patients diagnosed with severe sepsis for septic shock.    Change in PCT >80%  A decrease of PCT levels of more than 80% defines a negative change in PCT result representing a lower risk for 28-day all-cause mortality of patients diagnosed with severe sepsis or septic shock.       Urine Drug Screen - Straight Cath [[id number]]  (Normal) Collected: 04/06/24 2156    Specimen: Urine from Straight Cath Updated: 04/06/24 2226     Amphet/Methamphet, Screen Negative     Barbiturates Screen, Urine Negative     Benzodiazepine Screen, Urine Negative     Cocaine Screen, Urine Negative     Opiate Screen Negative     THC, Screen, Urine Negative     Methadone Screen, Urine Negative     Oxycodone Screen, Urine Negative     Fentanyl, Urine Negative    Narrative:      Negative Thresholds Per Drugs Screened:    Amphetamines                 500 ng/ml  Barbiturates                 200 ng/ml  Benzodiazepines              100 ng/ml  Cocaine                      300 ng/ml  Methadone                    300 ng/ml  Opiates                      300 ng/ml  Oxycodone                    100 ng/ml  THC                           50 ng/ml  Fentanyl                       5 ng/ml      The Normal Value for all drugs tested is negative. This report includes final unconfirmed screening results to be used for medical treatment purposes only. Unconfirmed results must not be used for non-medical purposes such as employment or legal testing. Clinical consideration " should be applied to any drug of abuse test, particularly when unconfirmed results are used.            Urinalysis With Microscopic If Indicated (No Culture) - Straight Cath [[id number]]  (Normal) Collected: 04/06/24 2156    Specimen: Urine from Straight Cath Updated: 04/06/24 2206     Color, UA Yellow     Appearance, UA Clear     pH, UA <=5.0     Specific Gravity, UA 1.007     Glucose, UA Negative     Ketones, UA Negative     Bilirubin, UA Negative     Blood, UA Negative     Protein, UA Negative     Leuk Esterase, UA Negative     Nitrite, UA Negative     Urobilinogen, UA 0.2 E.U./dL    Narrative:      Urine microscopic not indicated.    Comprehensive Metabolic Panel [[id number]]  (Abnormal) Collected: 04/06/24 2133    Specimen: Blood Updated: 04/06/24 2202     Glucose 161 mg/dL      BUN 7 mg/dL      Creatinine 0.75 mg/dL      Sodium 139 mmol/L      Potassium 3.6 mmol/L      Chloride 104 mmol/L      CO2 16.0 mmol/L      Calcium 8.1 mg/dL      Total Protein 7.1 g/dL      Albumin 4.2 g/dL      ALT (SGPT) 47 U/L      AST (SGOT) 42 U/L      Alkaline Phosphatase 123 U/L      Total Bilirubin <0.2 mg/dL      Globulin 2.9 gm/dL      A/G Ratio 1.4 g/dL      BUN/Creatinine Ratio 9.3     Anion Gap 19.0 mmol/L      eGFR 111.3 mL/min/1.73     Narrative:      GFR Normal >60  Chronic Kidney Disease <60  Kidney Failure <15      Ethanol [[id number]]  (Abnormal) Collected: 04/06/24 2133    Specimen: Blood Updated: 04/06/24 2202     Ethanol 170 mg/dL      Ethanol % 0.170 %     Protime-INR [[id number]]  (Normal) Collected: 04/06/24 2133    Specimen: Blood Updated: 04/06/24 2200     Protime 13.5 Seconds      INR 1.01    aPTT [[id number]]  (Abnormal) Collected: 04/06/24 2133    Specimen: Blood Updated: 04/06/24 2200     PTT 20.7 seconds     Narrative:      No clot detected.      CBC & Differential [[id number]]  (Abnormal) Collected: 04/06/24 2133    Specimen: Blood Updated: 04/06/24 2144    Narrative:      The following orders were created for  "panel order CBC & Differential.  Procedure                               Abnormality         Status                     ---------                               -----------         ------                     CBC Auto Differential[[id number]]        Abnormal            Final result                 Please view results for these tests on the individual orders.    CBC Auto Differential [[id number]]  (Abnormal) Collected: 04/06/24 2133    Specimen: Blood Updated: 04/06/24 2144     WBC 10.94 10*3/mm3      RBC 4.44 10*6/mm3      Hemoglobin 14.4 g/dL      Hematocrit 42.4 %      MCV 95.5 fL      MCH 32.4 pg      MCHC 34.0 g/dL      RDW 12.5 %      RDW-SD 43.4 fl      MPV 8.9 fL      Platelets 311 10*3/mm3      Neutrophil % 46.4 %      Lymphocyte % 38.5 %      Monocyte % 11.0 %      Eosinophil % 2.4 %      Basophil % 0.6 %      Immature Grans % 1.1 %      Neutrophils, Absolute 5.08 10*3/mm3      Lymphocytes, Absolute 4.21 10*3/mm3      Monocytes, Absolute 1.20 10*3/mm3      Eosinophils, Absolute 0.26 10*3/mm3      Basophils, Absolute 0.07 10*3/mm3      Immature Grans, Absolute 0.12 10*3/mm3      nRBC 0.0 /100 WBC           Data Review:  Results from last 7 days   Lab Units 04/08/24 0227 04/06/24 2133   SODIUM mmol/L 136 139   POTASSIUM mmol/L 3.8 3.6   CHLORIDE mmol/L 102 104   CO2 mmol/L 23.0 16.0*   BUN mg/dL 8 7   CREATININE mg/dL 0.73* 0.75*   GLUCOSE mg/dL 126* 161*   CALCIUM mg/dL 8.2* 8.1*     Results from last 7 days   Lab Units 04/08/24 0227 04/06/24 2133   WBC 10*3/mm3 12.07* 10.94*   HEMOGLOBIN g/dL 14.7 14.4   HEMATOCRIT % 42.7 42.4   PLATELETS 10*3/mm3 263 311             No results found for: \"TROPONINT\"      Results from last 7 days   Lab Units 04/06/24 2133   ALK PHOS U/L 123*   BILIRUBIN mg/dL <0.2   ALT (SGPT) U/L 47*   AST (SGOT) U/L 42*             No results found for: \"POCGLU\"  Results from last 7 days   Lab Units 04/06/24  2133   INR  1.01       History reviewed. No pertinent past medical " history.    Assessment:  Active Hospital Problems    Diagnosis  POA    **Alcohol intoxication [F10.929]  Yes    HTN (hypertension) [I10]  Unknown    Transaminitis [R74.01]  Unknown      Resolved Hospital Problems   No resolved problems to display.       Plan:  Add more medicine for blood pressure control.  Continue antibiotics for possible aspiration.  Alcohol withdrawal protocol.  Follow-up on labs.    Ray Cahidez MD  4/8/2024  16:39 EDT
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
## 2024-04-08 NOTE — PROGRESS NOTES
Reviewed chart and interviewed pt:  Pt in bed upon my arrival  Introduced self and role. Gave pt information to speak to AA Central office in [place] to obtain Maltese speaking AA meetings and other resources.  Gave pt listing of ETOH treatment centers that do not have fee's for service and local centers for MILLIE treatment that accept Medicare. Edcuated MILLIE is brain disease and the sooner treatment occurs the better the outcomes.

## 2024-04-08 NOTE — PROGRESS NOTES
Discharge Planning Assessment  [facility]     Patient Name: Stephane Jaimes  MRN: [id number]  Today's Date: 4/8/2024    Admit Date: 4/6/2024    Plan: Return home with family   Discharge Needs Assessment       Row Name 04/08/24 1340       Living Environment    People in Home significant other    Name(s) of People in Home S/O Lorene Deleon and mady lyles    Current Living Arrangements apartment    Potentially Unsafe Housing Conditions none    Primary Care Provided by self    Provides Primary Care For no one, unable/limited ability to care for self    Family Caregiver if Needed significant other    Family Caregiver Names X/O Lorene Deleon [phone]    Quality of Family Relationships stressful    Able to Return to Prior Arrangements yes       Resource/Environmental Concerns    Resource/Environmental Concerns none    Transportation Concerns none       Transition Planning    Patient/Family Anticipates Transition to home with family    Patient/Family Anticipated Services at Transition none    Transportation Anticipated family or friend will provide       Discharge Needs Assessment    Readmission Within the Last 30 Days no previous admission in last 30 days    Equipment Currently Used at Home none    Concerns to be Addressed substance/tobacco abuse/use    Anticipated Changes Related to Illness none    Current Discharge Risk substance use/abuse                   Discharge Plan       Row Name 04/08/24 1341       Plan    Plan Return home with family    Patient/Family in Agreement with Plan yes    Plan Comments Spoke with patient at bedside.  He mumbled answers to most questions then kept falling asleep.  Patient lives with S/O Lorene Deleon [phone], uses no DME.  He does not have a PCP - given list of [facility] physicians and given list of area clinics in [place].  He will use meds to beds.  Patient plans to return home with family at [place].  Access following.  CCP will follow as needed.  Kimmy RIDDLE                   Continued Care and Services - Admitted Since 4/6/2024    No active coordination exists for this encounter.       Expected Discharge Date and Time       Expected Discharge Date Expected Discharge Time    Apr 9, 2024            Demographic Summary       Row Name 04/08/24 1338       General Information    Admission Type observation    Arrived From home    Referral Source admission list    Reason for Consult discharge planning    Preferred Language Sudanese                   Functional Status       Row Name 04/08/24 1339       Functional Status    Usual Activity Tolerance good    Current Activity Tolerance good       Functional Status, IADL    Medications independent    Meal Preparation assistive person  Lives with S/O    Housekeeping assistive person    Laundry assistive person    Shopping independent       Mental Status    General Appearance WDL WDL       Mental Status Summary    Recent Changes in Mental Status/Cognitive Functioning unable to assess  Kept falling asleep                          Becky S. [place], RN

## 2024-04-09 ENCOUNTER — APPOINTMENT (OUTPATIENT)
Dept: CT IMAGING | Facility: HOSPITAL | Age: 54
End: 2024-04-09

## 2024-04-09 ENCOUNTER — APPOINTMENT (OUTPATIENT)
Dept: CARDIOLOGY | Facility: HOSPITAL | Age: 54
End: 2024-04-09

## 2024-04-09 LAB
ALBUMIN SERPL-MCNC: 4 G/DL (ref 3.5–5.2)
ALBUMIN/GLOB SERPL: 1.4 G/DL
ALP SERPL-CCNC: 106 U/L (ref 39–117)
ALT SERPL W P-5'-P-CCNC: 32 U/L (ref 1–41)
ANION GAP SERPL CALCULATED.3IONS-SCNC: 12.2 MMOL/L (ref 5–15)
ANION GAP SERPL CALCULATED.3IONS-SCNC: 13 MMOL/L (ref 5–15)
ANION GAP SERPL CALCULATED.3IONS-SCNC: 15.4 MMOL/L (ref 5–15)
AST SERPL-CCNC: 28 U/L (ref 1–40)
BASOPHILS # BLD AUTO: 0.03 10*3/MM3 (ref 0–0.2)
BASOPHILS NFR BLD AUTO: 0.2 % (ref 0–1.5)
BH CV ECHO LEFT VENTRICLE GLOBAL LONGITUDINAL STRAIN: -20.8 %
BH CV ECHO MEAS - ACS: 1.86 CM
BH CV ECHO MEAS - AO MAX PG: 7.6 MMHG
BH CV ECHO MEAS - AO MEAN PG: 4 MMHG
BH CV ECHO MEAS - AO ROOT DIAM: 3.3 CM
BH CV ECHO MEAS - AO V2 MAX: 138 CM/SEC
BH CV ECHO MEAS - AO V2 VTI: 28.4 CM
BH CV ECHO MEAS - AVA(I,D): 3.3 CM2
BH CV ECHO MEAS - EDV(CUBED): 106.9 ML
BH CV ECHO MEAS - EDV(MOD-SP2): 109 ML
BH CV ECHO MEAS - EDV(MOD-SP4): 130 ML
BH CV ECHO MEAS - EF(MOD-BP): 70.8 %
BH CV ECHO MEAS - EF(MOD-SP2): 65.1 %
BH CV ECHO MEAS - EF(MOD-SP4): 64.6 %
BH CV ECHO MEAS - ESV(CUBED): 28.2 ML
BH CV ECHO MEAS - ESV(MOD-SP2): 38 ML
BH CV ECHO MEAS - ESV(MOD-SP4): 46 ML
BH CV ECHO MEAS - FS: 35.9 %
BH CV ECHO MEAS - IVS/LVPW: 0.96 CM
BH CV ECHO MEAS - IVSD: 1.15 CM
BH CV ECHO MEAS - LAT PEAK E' VEL: 17.3 CM/SEC
BH CV ECHO MEAS - LV DIASTOLIC VOL/BSA (35-75): 64.2 CM2
BH CV ECHO MEAS - LV MASS(C)D: 206.7 GRAMS
BH CV ECHO MEAS - LV MAX PG: 5.9 MMHG
BH CV ECHO MEAS - LV MEAN PG: 3 MMHG
BH CV ECHO MEAS - LV SYSTOLIC VOL/BSA (12-30): 22.7 CM2
BH CV ECHO MEAS - LV V1 MAX: 121 CM/SEC
BH CV ECHO MEAS - LV V1 VTI: 18.9 CM
BH CV ECHO MEAS - LVIDD: 4.7 CM
BH CV ECHO MEAS - LVIDS: 3 CM
BH CV ECHO MEAS - LVOT AREA: 5 CM2
BH CV ECHO MEAS - LVOT DIAM: 2.5 CM
BH CV ECHO MEAS - LVPWD: 1.19 CM
BH CV ECHO MEAS - MED PEAK E' VEL: 8.7 CM/SEC
BH CV ECHO MEAS - MV A DUR: 0.13 SEC
BH CV ECHO MEAS - MV A MAX VEL: 81.2 CM/SEC
BH CV ECHO MEAS - MV DEC SLOPE: 580.2 CM/SEC2
BH CV ECHO MEAS - MV DEC TIME: 0.2 SEC
BH CV ECHO MEAS - MV E MAX VEL: 97.2 CM/SEC
BH CV ECHO MEAS - MV E/A: 1.2
BH CV ECHO MEAS - MV MAX PG: 4.4 MMHG
BH CV ECHO MEAS - MV MEAN PG: 1.78 MMHG
BH CV ECHO MEAS - MV P1/2T: 55.1 MSEC
BH CV ECHO MEAS - MV V2 VTI: 32.6 CM
BH CV ECHO MEAS - MVA(P1/2T): 4 CM2
BH CV ECHO MEAS - MVA(VTI): 2.9 CM2
BH CV ECHO MEAS - PA ACC TIME: 0.13 SEC
BH CV ECHO MEAS - PA V2 MAX: 132.9 CM/SEC
BH CV ECHO MEAS - PULM A REVS DUR: 0.11 SEC
BH CV ECHO MEAS - PULM A REVS VEL: 42.9 CM/SEC
BH CV ECHO MEAS - PULM DIAS VEL: 41.5 CM/SEC
BH CV ECHO MEAS - PULM S/D: 1.46
BH CV ECHO MEAS - PULM SYS VEL: 60.7 CM/SEC
BH CV ECHO MEAS - RV MAX PG: 5.8 MMHG
BH CV ECHO MEAS - RV V1 MAX: 119.9 CM/SEC
BH CV ECHO MEAS - RV V1 VTI: 26.2 CM
BH CV ECHO MEAS - SV(LVOT): 94.1 ML
BH CV ECHO MEAS - SV(MOD-SP2): 71 ML
BH CV ECHO MEAS - SV(MOD-SP4): 84 ML
BH CV ECHO MEAS - SVI(MOD-SP2): 35 ML/M2
BH CV ECHO MEAS - SVI(MOD-SP4): 41.5 ML/M2
BH CV ECHO MEAS - TAPSE (>1.6): 2.2 CM
BH CV ECHO MEASUREMENTS AVERAGE E/E' RATIO: 7.48
BH CV XLRA - TDI S': 12.9 CM/SEC
BILIRUB SERPL-MCNC: 0.5 MG/DL (ref 0–1.2)
BUN SERPL-MCNC: 10 MG/DL (ref 6–20)
BUN SERPL-MCNC: 9 MG/DL (ref 6–20)
BUN SERPL-MCNC: 9 MG/DL (ref 6–20)
BUN/CREAT SERPL: 13 (ref 7–25)
BUN/CREAT SERPL: 14.3 (ref 7–25)
BUN/CREAT SERPL: 15.2 (ref 7–25)
CALCIUM SPEC-SCNC: 8.5 MG/DL (ref 8.6–10.5)
CALCIUM SPEC-SCNC: 8.7 MG/DL (ref 8.6–10.5)
CALCIUM SPEC-SCNC: 8.9 MG/DL (ref 8.6–10.5)
CHLORIDE SERPL-SCNC: 94 MMOL/L (ref 98–107)
CHLORIDE SERPL-SCNC: 96 MMOL/L (ref 98–107)
CHLORIDE SERPL-SCNC: 98 MMOL/L (ref 98–107)
CO2 SERPL-SCNC: 18.6 MMOL/L (ref 22–29)
CO2 SERPL-SCNC: 20.8 MMOL/L (ref 22–29)
CO2 SERPL-SCNC: 21 MMOL/L (ref 22–29)
CREAT SERPL-MCNC: 0.63 MG/DL (ref 0.76–1.27)
CREAT SERPL-MCNC: 0.66 MG/DL (ref 0.76–1.27)
CREAT SERPL-MCNC: 0.69 MG/DL (ref 0.76–1.27)
DEPRECATED RDW RBC AUTO: 44.9 FL (ref 37–54)
EGFRCR SERPLBLD CKD-EPI 2021: 112 ML/MIN/1.73
EGFRCR SERPLBLD CKD-EPI 2021: 113.6 ML/MIN/1.73
EGFRCR SERPLBLD CKD-EPI 2021: 115.2 ML/MIN/1.73
EOSINOPHIL # BLD AUTO: 0.07 10*3/MM3 (ref 0–0.4)
EOSINOPHIL NFR BLD AUTO: 0.6 % (ref 0.3–6.2)
ERYTHROCYTE [DISTWIDTH] IN BLOOD BY AUTOMATED COUNT: 12.6 % (ref 12.3–15.4)
GLOBULIN UR ELPH-MCNC: 2.9 GM/DL
GLUCOSE BLDC GLUCOMTR-MCNC: 123 MG/DL (ref 70–130)
GLUCOSE SERPL-MCNC: 115 MG/DL (ref 65–99)
GLUCOSE SERPL-MCNC: 126 MG/DL (ref 65–99)
GLUCOSE SERPL-MCNC: 141 MG/DL (ref 65–99)
HCT VFR BLD AUTO: 43.6 % (ref 37.5–51)
HGB BLD-MCNC: 15.1 G/DL (ref 13–17.7)
IMM GRANULOCYTES # BLD AUTO: 0.15 10*3/MM3 (ref 0–0.05)
IMM GRANULOCYTES NFR BLD AUTO: 1.2 % (ref 0–0.5)
LEFT ATRIUM VOLUME INDEX: 22.3 ML/M2
LYMPHOCYTES # BLD AUTO: 1.17 10*3/MM3 (ref 0.7–3.1)
LYMPHOCYTES NFR BLD AUTO: 9.6 % (ref 19.6–45.3)
MAGNESIUM SERPL-MCNC: 2.1 MG/DL (ref 1.6–2.6)
MCH RBC QN AUTO: 33.6 PG (ref 26.6–33)
MCHC RBC AUTO-ENTMCNC: 34.6 G/DL (ref 31.5–35.7)
MCV RBC AUTO: 97.1 FL (ref 79–97)
MONOCYTES # BLD AUTO: 1.29 10*3/MM3 (ref 0.1–0.9)
MONOCYTES NFR BLD AUTO: 10.5 % (ref 5–12)
NEUTROPHILS NFR BLD AUTO: 77.9 % (ref 42.7–76)
NEUTROPHILS NFR BLD AUTO: 9.54 10*3/MM3 (ref 1.7–7)
NRBC BLD AUTO-RTO: 0 /100 WBC (ref 0–0.2)
NT-PROBNP SERPL-MCNC: 367 PG/ML (ref 0–900)
NT-PROBNP SERPL-MCNC: 451 PG/ML (ref 0–900)
OSMOLALITY SERPL: 272 MOSM/KG (ref 275–300)
OSMOLALITY SERPL: 275 MOSM/KG (ref 275–300)
OSMOLALITY UR: 532 MOSM/KG
PHOSPHATE SERPL-MCNC: 2.4 MG/DL (ref 2.5–4.5)
PLATELET # BLD AUTO: 266 10*3/MM3 (ref 140–450)
PMV BLD AUTO: 9.1 FL (ref 6–12)
POTASSIUM SERPL-SCNC: 3.2 MMOL/L (ref 3.5–5.2)
POTASSIUM SERPL-SCNC: 3.3 MMOL/L (ref 3.5–5.2)
POTASSIUM SERPL-SCNC: 3.3 MMOL/L (ref 3.5–5.2)
PROT SERPL-MCNC: 6.9 G/DL (ref 6–8.5)
RBC # BLD AUTO: 4.49 10*6/MM3 (ref 4.14–5.8)
SINUS: 3.1 CM
SODIUM SERPL-SCNC: 128 MMOL/L (ref 136–145)
SODIUM SERPL-SCNC: 128 MMOL/L (ref 136–145)
SODIUM SERPL-SCNC: 130 MMOL/L (ref 136–145)
SODIUM SERPL-SCNC: 131 MMOL/L (ref 136–145)
STJ: 3.3 CM
WBC NRBC COR # BLD AUTO: 12.25 10*3/MM3 (ref 3.4–10.8)

## 2024-04-09 PROCEDURE — 25010000002 POTASSIUM CHLORIDE 10 MEQ/100ML SOLUTION: Performed by: HOSPITALIST

## 2024-04-09 PROCEDURE — G0378 HOSPITAL OBSERVATION PER HR: HCPCS

## 2024-04-09 PROCEDURE — 83935 ASSAY OF URINE OSMOLALITY: CPT | Performed by: STUDENT IN AN ORGANIZED HEALTH CARE EDUCATION/TRAINING PROGRAM

## 2024-04-09 PROCEDURE — 99244 OFF/OP CNSLTJ NEW/EST MOD 40: CPT | Performed by: NEUROLOGICAL SURGERY

## 2024-04-09 PROCEDURE — 80053 COMPREHEN METABOLIC PANEL: CPT | Performed by: HOSPITALIST

## 2024-04-09 PROCEDURE — 84100 ASSAY OF PHOSPHORUS: CPT | Performed by: NURSE PRACTITIONER

## 2024-04-09 PROCEDURE — 25510000001 IOPAMIDOL PER 1 ML: Performed by: HOSPITALIST

## 2024-04-09 PROCEDURE — 84295 ASSAY OF SERUM SODIUM: CPT | Performed by: STUDENT IN AN ORGANIZED HEALTH CARE EDUCATION/TRAINING PROGRAM

## 2024-04-09 PROCEDURE — 82948 REAGENT STRIP/BLOOD GLUCOSE: CPT

## 2024-04-09 PROCEDURE — 85025 COMPLETE CBC W/AUTO DIFF WBC: CPT | Performed by: HOSPITALIST

## 2024-04-09 PROCEDURE — 25010000002 CEFTRIAXONE PER 250 MG: Performed by: HOSPITALIST

## 2024-04-09 PROCEDURE — 25810000003 SODIUM CHLORIDE 3 % SOLUTION: Performed by: STUDENT IN AN ORGANIZED HEALTH CARE EDUCATION/TRAINING PROGRAM

## 2024-04-09 PROCEDURE — 70498 CT ANGIOGRAPHY NECK: CPT

## 2024-04-09 PROCEDURE — 93306 TTE W/DOPPLER COMPLETE: CPT | Performed by: INTERNAL MEDICINE

## 2024-04-09 PROCEDURE — 93356 MYOCRD STRAIN IMG SPCKL TRCK: CPT | Performed by: INTERNAL MEDICINE

## 2024-04-09 PROCEDURE — 25810000003 LACTATED RINGERS PER 1000 ML: Performed by: NURSE PRACTITIONER

## 2024-04-09 PROCEDURE — 83930 ASSAY OF BLOOD OSMOLALITY: CPT | Performed by: STUDENT IN AN ORGANIZED HEALTH CARE EDUCATION/TRAINING PROGRAM

## 2024-04-09 PROCEDURE — 93356 MYOCRD STRAIN IMG SPCKL TRCK: CPT

## 2024-04-09 PROCEDURE — 83735 ASSAY OF MAGNESIUM: CPT | Performed by: NURSE PRACTITIONER

## 2024-04-09 PROCEDURE — 25510000001 PERFLUTREN (DEFINITY) 8.476 MG IN SODIUM CHLORIDE (PF) 0.9 % 10 ML INJECTION: Performed by: NURSE PRACTITIONER

## 2024-04-09 PROCEDURE — 93306 TTE W/DOPPLER COMPLETE: CPT

## 2024-04-09 PROCEDURE — 0042T HC CT CEREBRAL PERFUSION W/WO CONTRAST: CPT

## 2024-04-09 PROCEDURE — 70496 CT ANGIOGRAPHY HEAD: CPT

## 2024-04-09 PROCEDURE — 25010000002 HYDRALAZINE PER 20 MG: Performed by: HOSPITALIST

## 2024-04-09 PROCEDURE — 83880 ASSAY OF NATRIURETIC PEPTIDE: CPT | Performed by: NURSE PRACTITIONER

## 2024-04-09 PROCEDURE — 25010000002 THIAMINE HCL 200 MG/2ML SOLUTION: Performed by: NURSE PRACTITIONER

## 2024-04-09 PROCEDURE — 83880 ASSAY OF NATRIURETIC PEPTIDE: CPT | Performed by: STUDENT IN AN ORGANIZED HEALTH CARE EDUCATION/TRAINING PROGRAM

## 2024-04-09 RX ORDER — POTASSIUM CHLORIDE 7.45 MG/ML
10 INJECTION INTRAVENOUS
Status: DISPENSED | OUTPATIENT
Start: 2024-04-09 | End: 2024-04-09

## 2024-04-09 RX ORDER — 3% SODIUM CHLORIDE 3 G/100ML
INJECTION, SOLUTION INTRAVENOUS CONTINUOUS
Status: CANCELLED | OUTPATIENT
Start: 2024-04-09 | End: 2024-04-10

## 2024-04-09 RX ORDER — POTASSIUM CHLORIDE 750 MG/1
40 TABLET, FILM COATED, EXTENDED RELEASE ORAL EVERY 4 HOURS
Status: DISCONTINUED | OUTPATIENT
Start: 2024-04-09 | End: 2024-04-09

## 2024-04-09 RX ORDER — SODIUM CHLORIDE 3 G/100ML
2 INJECTION, SOLUTION INTRAVENOUS ONCE
Status: COMPLETED | OUTPATIENT
Start: 2024-04-09 | End: 2024-04-09

## 2024-04-09 RX ORDER — 3% SODIUM CHLORIDE 3 G/100ML
30 INJECTION, SOLUTION INTRAVENOUS CONTINUOUS
Status: DISCONTINUED | OUTPATIENT
Start: 2024-04-09 | End: 2024-04-10

## 2024-04-09 RX ORDER — POTASSIUM CHLORIDE 7.45 MG/ML
10 INJECTION INTRAVENOUS
Status: COMPLETED | OUTPATIENT
Start: 2024-04-09 | End: 2024-04-10

## 2024-04-09 RX ADMIN — THIAMINE HYDROCHLORIDE 200 MG: 100 INJECTION, SOLUTION INTRAMUSCULAR; INTRAVENOUS at 06:01

## 2024-04-09 RX ADMIN — Medication 1 TABLET: at 08:47

## 2024-04-09 RX ADMIN — POTASSIUM CHLORIDE 10 MEQ: 7.46 INJECTION, SOLUTION INTRAVENOUS at 21:51

## 2024-04-09 RX ADMIN — PERFLUTREN 3 ML: 6.52 INJECTION, SUSPENSION INTRAVENOUS at 15:08

## 2024-04-09 RX ADMIN — AMLODIPINE BESYLATE 10 MG: 10 TABLET ORAL at 08:47

## 2024-04-09 RX ADMIN — POTASSIUM CHLORIDE 10 MEQ: 7.46 INJECTION, SOLUTION INTRAVENOUS at 16:11

## 2024-04-09 RX ADMIN — IOPAMIDOL 150 ML: 755 INJECTION, SOLUTION INTRAVENOUS at 17:01

## 2024-04-09 RX ADMIN — POTASSIUM CHLORIDE 10 MEQ: 7.46 INJECTION, SOLUTION INTRAVENOUS at 23:02

## 2024-04-09 RX ADMIN — SODIUM CHLORIDE, POTASSIUM CHLORIDE, SODIUM LACTATE AND CALCIUM CHLORIDE 75 ML/HR: 600; 310; 30; 20 INJECTION, SOLUTION INTRAVENOUS at 21:51

## 2024-04-09 RX ADMIN — THIAMINE HYDROCHLORIDE 200 MG: 100 INJECTION, SOLUTION INTRAMUSCULAR; INTRAVENOUS at 16:10

## 2024-04-09 RX ADMIN — THIAMINE HYDROCHLORIDE 200 MG: 100 INJECTION, SOLUTION INTRAMUSCULAR; INTRAVENOUS at 21:33

## 2024-04-09 RX ADMIN — HYDRALAZINE HYDROCHLORIDE 10 MG: 20 INJECTION INTRAMUSCULAR; INTRAVENOUS at 22:34

## 2024-04-09 RX ADMIN — FOLIC ACID 1 MG: 1 TABLET ORAL at 08:47

## 2024-04-09 RX ADMIN — CEFTRIAXONE 2000 MG: 2 INJECTION, POWDER, FOR SOLUTION INTRAMUSCULAR; INTRAVENOUS at 08:47

## 2024-04-09 RX ADMIN — HYDRALAZINE HYDROCHLORIDE 10 MG: 20 INJECTION INTRAMUSCULAR; INTRAVENOUS at 16:10

## 2024-04-09 RX ADMIN — SODIUM CHLORIDE 182.8 ML: 3 INJECTION, SOLUTION INTRAVENOUS at 18:14

## 2024-04-09 NOTE — CONSULTS
Patient Identification:  Stephane aJimes  53 y.o.  male  1970  [id number]          LOS 0    Requesting physician: Dr Marks    Reason for Consultation: ICU medical management    History of Present Illness:   Patient was admitted by hospitalist service on 4/7/2024 with alcohol intoxication.  He was found unresponsive in his car by EMS.  Received Narcan with some improvement but started vomiting.  Was started on antibiotics for possible aspiration pneumonia and being treated for alcohol withdrawal.  Per hospitalist note that he is more sleepy today which was presumed due to Ativan for withdrawal symptoms.  Repeat CT head performed today reviewed showed subacute infarct with mass effect of the fourth ventricle and new obstructive hydrocephalus involving the lateral and third ventricles.  Occluded left superior cerebellar artery.  Pseudo occlusive findings of the right cervical vertebral artery.  And left cervical vertebral artery abnormality suggesting nonocclusive thrombus versus artifact.  Dissection also possibility.  Patient was transferred from floor to intensive care for closer monitoring.    Past Medical History:  History reviewed. No pertinent past medical history.    Past Surgical History:  History reviewed. No pertinent surgical history.     Home Meds:  No medications prior to admission.         Allergies:  No Known Allergies    Social History:   Social History     Socioeconomic History    Marital status: Single   Tobacco Use    Smoking status: Never    Smokeless tobacco: Never   Vaping Use    Vaping status: Never Used   Substance and Sexual Activity    Alcohol use: Yes    Drug use: Never    Sexual activity: Defer       Family History:  History reviewed. No pertinent family history.    Review of Systems:  Confused and non-English speaking.  Unable to obtain review of systems    Objective:    PHYSICAL EXAM:    BP (!) 173/101 (BP Location: Right arm, Patient Position: Lying)   Pulse 84   Temp 98.4 °F  "(36.9 °C) (Oral)   Resp 18   Ht 170.2 cm (67\")   Wt 91.4 kg (201 lb 8 oz)   SpO2 98%   BMI 31.56 kg/m²  Body mass index is 31.56 kg/m². 98% 91.4 kg (201 lb 8 oz)    GENERAL APPEARANCE:   Well developed  Confused  No acute distress   EYES:    PERRL                                                                           Conjunctivae normal  Sclerae nonicteric.  HENT:   Atraumatic, normocephalic  External ears and nose normal  Moist mucous membranes and no ulcers  NECK:  Thyroid not enlarged  Trachea midline   RESPIRATORY:    Nonlabored breathing   Normal breath sounds  No rales. No wheezing  No dullness  CARDIOVASCULAR:    RRR  Normal S1, S2  No murmur  Lower extremity edema: none    GI:   Bowel sounds normal  Abdomen soft , nondistended, nontender  No abdominal masses  MUSCULOSKELETAL:  Normal movement of extremities  No tenderness, no deformities  No clubbing or cyanosis   Skin:    No visible rashes  No palpable nodules  Cap refill normal.  No mottling.   NEUROLOGIC:  Cranial nerves II through XII grossly intact.  Sensation intact.      Lab Review:   Results from last 7 days   Lab Units 04/09/24  0302 04/08/24 0227 04/06/24  2133   WBC 10*3/mm3 12.25* 12.07* 10.94*   HEMOGLOBIN g/dL 15.1 14.7 14.4   HEMATOCRIT % 43.6 42.7 42.4   PLATELETS 10*3/mm3 266 263 311     Results from last 7 days   Lab Units 04/09/24  0302 04/08/24 0227 04/06/24  2133   SODIUM mmol/L 130* 136 139   POTASSIUM mmol/L 3.2* 3.8 3.6   CHLORIDE mmol/L 96* 102 104   CO2 mmol/L 18.6* 23.0 16.0*   BUN mg/dL 9 8 7   CREATININE mg/dL 0.63* 0.73* 0.75*   CALCIUM mg/dL 8.5* 8.2* 8.1*   BILIRUBIN mg/dL 0.5  --  <0.2   ALK PHOS U/L 106  --  123*   ALT (SGPT) U/L 32  --  47*   AST (SGOT) U/L 28  --  42*   GLUCOSE mg/dL 141* 126* 161*             Lab 04/08/24 0227 04/07/24 2016 04/07/24  1421 04/07/24  1114 04/07/24  0806   LACTATE 2.2* 3.2* 2.3* 3.0* 3.8*     Procalitonin Results:      Lab 04/06/24  2133   PROCALCITONIN 0.06              "      Imaging reviewed  CT head 4/9 reviewed      2D echo 4/9 reviewed EF 70%      Chest x-ray 4/6 reviewed: Signs of pulmonary vascular congestion bibasilar atelectasis/infiltrates.             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  Hypertonic saline being initiated per neurology recommendations.  EEG.  Control blood pressure.  Alcohol withdrawal protocol.  On antibiotics for presumed aspiration pneumonia on admission.  Control glucose.  Mechanical DVT prophylaxis until pharmacologic cleared by neurology service.        Kirk Bee MD  [facility], [facility]  Pulmonary and Critical Care Medicine    4/9/2024  18:40 EDT

## 2024-04-09 NOTE — PROGRESS NOTES
"DAILY PROGRESS NOTE  [facility]    Patient Identification:  Name: Stephane Jaimes  Age: 53 y.o.  Sex: male  :  1970  MRN: [id number]         Primary Care Physician: System, Provider Not In    Subjective:  Interval History: He is sleepy today.  He got some Ativan according to the nurse but withdrawal symptoms are minimal.    Objective:    Scheduled Meds:amLODIPine, 10 mg, Oral, Q24H  cefTRIAXone, 2,000 mg, Intravenous, Daily  folic acid, 1 mg, Oral, Daily  multivitamin with minerals, 1 tablet, Oral, Daily  thiamine (B-1) IV, 200 mg, Intravenous, Q8H   Followed by  [START ON 2024] thiamine, 100 mg, Oral, Daily      Continuous Infusions:lactated ringers, 75 mL/hr, Last Rate: 75 mL/hr (24 1255)        Vital signs in last 24 hours:  Temp:  [98 °F (36.7 °C)-99.5 °F (37.5 °C)] 98 °F (36.7 °C)  Heart Rate:  [64-85] 66  Resp:  [18] 18  BP: (156-202)/() 190/96    Intake/Output:    Intake/Output Summary (Last 24 hours) at 2024 1302  Last data filed at 2024 0412  Gross per 24 hour   Intake --   Output 1850 ml   Net -1850 ml       Exam:  BP (!) 190/96 (BP Location: Right arm, Patient Position: Lying)   Pulse 66   Temp 98 °F (36.7 °C)   Resp 18   Ht 170.2 cm (67\")   Wt 91.4 kg (201 lb 8 oz)   SpO2 98%   BMI 31.56 kg/m²     General Appearance:  Sleepy, no distress   Head:    Normocephalic, without obvious abnormality, atraumatic   Eyes:       Throat:   Lips, tongue, gums normal   Neck:   Supple, symmetrical, trachea midline, no JVD   Lungs:     Clear to auscultation bilaterally, respirations unlabored   Chest Wall:    No tenderness or deformity    Heart:    Regular rate and rhythm, S1 and S2 normal, no murmur,no  Rub or gallop   Abdomen:     Soft, nontender, bowel sounds active, no masses, no organomegaly    Extremities:   Extremities normal, atraumatic, no cyanosis or edema   Pulses:      Skin:   Skin is warm and dry,  no rashes or palpable lesions   Neurologic:    Sleepy    "   Lab Results (last 72 hours)       Procedure Component Value Units Date/Time    MRSA Screen, PCR (Inpatient) - Swab, Nares [[id number]]  (Normal) Collected: 04/08/24 1042    Specimen: Swab from Nares Updated: 04/08/24 1206     MRSA PCR No MRSA Detected    Narrative:      The negative predictive value of this diagnostic test is high and should only be used to consider de-escalating anti-MRSA therapy. A positive result may indicate colonization with MRSA and must be correlated clinically.    Manual Differential [[id number]]  (Abnormal) Collected: 04/08/24 0227    Specimen: Blood Updated: 04/08/24 0302     Neutrophil % 82.8 %      Lymphocyte % 13.1 %      Monocyte % 2.0 %      Basophil % 2.0 %      Neutrophils Absolute 9.99 10*3/mm3      Lymphocytes Absolute 1.58 10*3/mm3      Monocytes Absolute 0.24 10*3/mm3      Basophils Absolute 0.24 10*3/mm3      RBC Morphology Normal     WBC Morphology Normal     Platelet Morphology Normal    STAT Lactic Acid, Reflex [[id number]]  (Abnormal) Collected: 04/08/24 0227    Specimen: Blood Updated: 04/08/24 0255     Lactate 2.2 mmol/L     Basic Metabolic Panel [[id number]]  (Abnormal) Collected: 04/08/24 0227    Specimen: Blood Updated: 04/08/24 0255     Glucose 126 mg/dL      BUN 8 mg/dL      Creatinine 0.73 mg/dL      Sodium 136 mmol/L      Potassium 3.8 mmol/L      Chloride 102 mmol/L      CO2 23.0 mmol/L      Calcium 8.2 mg/dL      BUN/Creatinine Ratio 11.0     Anion Gap 11.0 mmol/L      eGFR 110.2 mL/min/1.73     Narrative:      GFR Normal >60  Chronic Kidney Disease <60  Kidney Failure <15      CBC & Differential [[id number]]  (Abnormal) Collected: 04/08/24 0227    Specimen: Blood Updated: 04/08/24 0241    Narrative:      The following orders were created for panel order CBC & Differential.  Procedure                               Abnormality         Status                     ---------                               -----------         ------                     CBC Auto  Differential[[id number]]        Abnormal            Final result                 Please view results for these tests on the individual orders.    CBC Auto Differential [[id number]]  (Abnormal) Collected: 04/08/24 0227    Specimen: Blood Updated: 04/08/24 0241     WBC 12.07 10*3/mm3      RBC 4.31 10*6/mm3      Hemoglobin 14.7 g/dL      Hematocrit 42.7 %      MCV 99.1 fL      MCH 34.1 pg      MCHC 34.4 g/dL      RDW 12.9 %      RDW-SD 47.7 fl      MPV 8.8 fL      Platelets 263 10*3/mm3      nRBC 0.0 /100 WBC     STAT Lactic Acid, Reflex [[id number]]  (Abnormal) Collected: 04/07/24 2016    Specimen: Blood from Hand, Right Updated: 04/07/24 2055     Lactate 3.2 mmol/L     STAT Lactic Acid, Reflex [[id number]]  (Abnormal) Collected: 04/07/24 1421    Specimen: Blood from Arm, Right Updated: 04/07/24 1452     Lactate 2.3 mmol/L     STAT Lactic Acid, Reflex [[id number]]  (Abnormal) Collected: 04/07/24 1114    Specimen: Blood Updated: 04/07/24 1211     Lactate 3.0 mmol/L     STAT Lactic Acid, Reflex [[id number]]  (Abnormal) Collected: 04/07/24 0806    Specimen: Blood Updated: 04/07/24 0905     Lactate 3.8 mmol/L     CK [[id number]]  (Normal) Collected: 04/06/24 2133    Specimen: Blood Updated: 04/07/24 0556     Creatine Kinase 122 U/L     Lactic Acid, Plasma [[id number]]  (Abnormal) Collected: 04/07/24 0510    Specimen: Blood Updated: 04/07/24 0540     Lactate 3.9 mmol/L     Procalcitonin [[id number]]  (Normal) Collected: 04/06/24 2133    Specimen: Blood Updated: 04/07/24 0502     Procalcitonin 0.06 ng/mL     Narrative:      As a Marker for Sepsis (Non-Neonates):    1. <0.5 ng/mL represents a low risk of severe sepsis and/or septic shock.  2. >2 ng/mL represents a high risk of severe sepsis and/or septic shock.    As a Marker for Lower Respiratory Tract Infections that require antibiotic therapy:    PCT on Admission    Antibiotic Therapy       6-12 Hrs later    >0.5                Strongly Recommended  >0.25 - <0.5        Recommended  "  0.1 - 0.25          Discouraged              Remeasure/reassess PCT  <0.1                Strongly Discouraged     Remeasure/reassess PCT    As 28 day mortality risk marker: \"Change in Procalcitonin Result\" (>80% or <=80%) if Day 0 (or Day 1) and Day 4 values are available. Refer to http://www.[email or web address] [email or web address], [email or web address]-pct-calculator.[email or web address]    Change in PCT <=80%  A decrease of PCT levels below or equal to 80% defines a positive change in PCT test result representing a higher risk for 28-day all-cause mortality of patients diagnosed with severe sepsis for septic shock.    Change in PCT >80%  A decrease of PCT levels of more than 80% defines a negative change in PCT result representing a lower risk for 28-day all-cause mortality of patients diagnosed with severe sepsis or septic shock.       Urine Drug Screen - Straight Cath [[id number]]  (Normal) Collected: 04/06/24 2156    Specimen: Urine from Straight Cath Updated: 04/06/24 2226     Amphet/Methamphet, Screen Negative     Barbiturates Screen, Urine Negative     Benzodiazepine Screen, Urine Negative     Cocaine Screen, Urine Negative     Opiate Screen Negative     THC, Screen, Urine Negative     Methadone Screen, Urine Negative     Oxycodone Screen, Urine Negative     Fentanyl, Urine Negative    Narrative:      Negative Thresholds Per Drugs Screened:    Amphetamines                 500 ng/ml  Barbiturates                 200 ng/ml  Benzodiazepines              100 ng/ml  Cocaine                      300 ng/ml  Methadone                    300 ng/ml  Opiates                      300 ng/ml  Oxycodone                    100 ng/ml  THC                           50 ng/ml  Fentanyl                       5 ng/ml      The Normal Value for all drugs tested is negative. This report includes final unconfirmed screening results to be used for medical treatment purposes only. Unconfirmed results must not be used for non-medical purposes such as employment or legal testing. Clinical consideration " should be applied to any drug of abuse test, particularly when unconfirmed results are used.            Urinalysis With Microscopic If Indicated (No Culture) - Straight Cath [[id number]]  (Normal) Collected: 04/06/24 2156    Specimen: Urine from Straight Cath Updated: 04/06/24 2206     Color, UA Yellow     Appearance, UA Clear     pH, UA <=5.0     Specific Gravity, UA 1.007     Glucose, UA Negative     Ketones, UA Negative     Bilirubin, UA Negative     Blood, UA Negative     Protein, UA Negative     Leuk Esterase, UA Negative     Nitrite, UA Negative     Urobilinogen, UA 0.2 E.U./dL    Narrative:      Urine microscopic not indicated.    Comprehensive Metabolic Panel [[id number]]  (Abnormal) Collected: 04/06/24 2133    Specimen: Blood Updated: 04/06/24 2202     Glucose 161 mg/dL      BUN 7 mg/dL      Creatinine 0.75 mg/dL      Sodium 139 mmol/L      Potassium 3.6 mmol/L      Chloride 104 mmol/L      CO2 16.0 mmol/L      Calcium 8.1 mg/dL      Total Protein 7.1 g/dL      Albumin 4.2 g/dL      ALT (SGPT) 47 U/L      AST (SGOT) 42 U/L      Alkaline Phosphatase 123 U/L      Total Bilirubin <0.2 mg/dL      Globulin 2.9 gm/dL      A/G Ratio 1.4 g/dL      BUN/Creatinine Ratio 9.3     Anion Gap 19.0 mmol/L      eGFR 111.3 mL/min/1.73     Narrative:      GFR Normal >60  Chronic Kidney Disease <60  Kidney Failure <15      Ethanol [[id number]]  (Abnormal) Collected: 04/06/24 2133    Specimen: Blood Updated: 04/06/24 2202     Ethanol 170 mg/dL      Ethanol % 0.170 %     Protime-INR [[id number]]  (Normal) Collected: 04/06/24 2133    Specimen: Blood Updated: 04/06/24 2200     Protime 13.5 Seconds      INR 1.01    aPTT [[id number]]  (Abnormal) Collected: 04/06/24 2133    Specimen: Blood Updated: 04/06/24 2200     PTT 20.7 seconds     Narrative:      No clot detected.      CBC & Differential [[id number]]  (Abnormal) Collected: 04/06/24 2133    Specimen: Blood Updated: 04/06/24 2144    Narrative:      The following orders were created for  "panel order CBC & Differential.  Procedure                               Abnormality         Status                     ---------                               -----------         ------                     CBC Auto Differential[[id number]]        Abnormal            Final result                 Please view results for these tests on the individual orders.    CBC Auto Differential [[id number]]  (Abnormal) Collected: 04/06/24 2133    Specimen: Blood Updated: 04/06/24 2144     WBC 10.94 10*3/mm3      RBC 4.44 10*6/mm3      Hemoglobin 14.4 g/dL      Hematocrit 42.4 %      MCV 95.5 fL      MCH 32.4 pg      MCHC 34.0 g/dL      RDW 12.5 %      RDW-SD 43.4 fl      MPV 8.9 fL      Platelets 311 10*3/mm3      Neutrophil % 46.4 %      Lymphocyte % 38.5 %      Monocyte % 11.0 %      Eosinophil % 2.4 %      Basophil % 0.6 %      Immature Grans % 1.1 %      Neutrophils, Absolute 5.08 10*3/mm3      Lymphocytes, Absolute 4.21 10*3/mm3      Monocytes, Absolute 1.20 10*3/mm3      Eosinophils, Absolute 0.26 10*3/mm3      Basophils, Absolute 0.07 10*3/mm3      Immature Grans, Absolute 0.12 10*3/mm3      nRBC 0.0 /100 WBC           Data Review:  Results from last 7 days   Lab Units 04/09/24 0302 04/08/24 0227 04/06/24 2133   SODIUM mmol/L 130* 136 139   POTASSIUM mmol/L 3.2* 3.8 3.6   CHLORIDE mmol/L 96* 102 104   CO2 mmol/L 18.6* 23.0 16.0*   BUN mg/dL 9 8 7   CREATININE mg/dL 0.63* 0.73* 0.75*   GLUCOSE mg/dL 141* 126* 161*   CALCIUM mg/dL 8.5* 8.2* 8.1*     Results from last 7 days   Lab Units 04/09/24 0302 04/08/24 0227 04/06/24 2133   WBC 10*3/mm3 12.25* 12.07* 10.94*   HEMOGLOBIN g/dL 15.1 14.7 14.4   HEMATOCRIT % 43.6 42.7 42.4   PLATELETS 10*3/mm3 266 263 311             No results found for: \"TROPONINT\"      Results from last 7 days   Lab Units 04/09/24 0302 04/06/24  2133   ALK PHOS U/L 106 123*   BILIRUBIN mg/dL 0.5 <0.2   ALT (SGPT) U/L 32 47*   AST (SGOT) U/L 28 42*             No results found for: \"POCGLU\"  Results " from last 7 days   Lab Units 04/06/24  2133   INR  1.01       History reviewed. No pertinent past medical history.    Assessment:  Active Hospital Problems    Diagnosis  POA    **Alcohol intoxication [F10.929]  Yes    HTN (hypertension) [I10]  Unknown    Transaminitis [R74.01]  Unknown      Resolved Hospital Problems   No resolved problems to display.       Plan:  Add more medicine for blood pressure control.  Continue antibiotics for possible aspiration.  Alcohol withdrawal protocol.  Follow-up on labs.  Repeat head CT.    Ray Chaidez MD  4/9/2024  13:02 EDT

## 2024-04-09 NOTE — NURSING NOTE
Access consulted for substance use disorder (alcohol) and SI.     Patient's RN states he has been asleep majority of shift only awakening briefly but quickly falling back asleep and snoring. CIWA scores have been 0; no prn ativan required. States BP has been elevated at times and pt. Has been medicated appropriately.     Access following.

## 2024-04-09 NOTE — CONSULTS
Patient Care Team:  System, Provider Not In as PCP - General    Chief complaint cerebellar stroke     Subjective .     History of present illness: This patient was apparently admitted on 7 April.  At that time he was  found unresponsive in his car.  He was given Narcan and had some improvement in his symptoms.  Apparently has a history of alcoholism.  He was admitted to the hospital and was evaluated for possible pneumonia from aspiration.  He was given medications for alcohol withdrawal was somewhat sleepier than when he was admitted.  Earlier today he was noted to be more somnolent and another CT was done.  His initial CT on 6 April did not show any abnormalities.  The CT today showed a cerebellar stroke and is discussed below.  Currently he does awaken and follow commands.  He talks but is somewhat difficult to understand.    Review of Systems  Pertinent items are noted in HPI.  History    History reviewed. No pertinent past medical history., History reviewed. No pertinent surgical history., History reviewed. No pertinent family history.,   Social History     Socioeconomic History    Marital status: Single   Tobacco Use    Smoking status: Never    Smokeless tobacco: Never   Vaping Use    Vaping status: Never Used   Substance and Sexual Activity    Alcohol use: Yes    Drug use: Never    Sexual activity: Defer     E-cigarette/Vaping    E-cigarette/Vaping Use Never User     Passive Exposure No     Counseling Given No      E-cigarette/Vaping Substances    Nicotine No     THC No     CBD No     Flavoring No      E-cigarette/Vaping Devices    Disposable No     Pre-filled or Refillable Cartridge No     Refillable Tank No     Pre-filled Pod No          ,   No medications prior to admission.   , and Allergies:  Patient has no known allergies.    Objective     Vital Signs   Temp:  [97.4 °F (36.3 °C)-98.3 °F (36.8 °C)] 98.1 °F (36.7 °C)  Heart Rate:  [65-98] 79  Resp:  [18] 18  BP: (106-177)/() 149/88    Physical  Exam:   Physical Exam  HENT:      Head: Normocephalic and atraumatic.          Neurologic Exam     Mental Status   Level of consciousness: drowsy  Able to name object.     Motor Exam Patient may have a little right-sided weakness but for the most part moves both upper and lower extremities quite well and to command.       Results Review:   I reviewed the patient's new clinical results.  I reviewed his CT which does show that the fourth ventricle is open however the ventricles are larger today than they were on April 6.      Assessment & Plan       Alcohol intoxication    Transaminitis    HTN (hypertension)    Cerebellar stroke    Cerebral edema      There is no family available to discuss the situation the patient is not awake enough or in a position to understand anything.  I have discussed situation with the nurses taking care of the patient.  I believe he may be developing hydrocephalus but currently he is to awake and alert to consider placement of a drain except under general anesthesia.  We will plan to continue to observe him.  Do a follow-up CT in the morning.    I discussed the patient's findings and my recommendations with nursing staff    Giuliano Garsia MD  04/12/24  09:22 EDT

## 2024-04-09 NOTE — PLAN OF CARE
Goal Outcome Evaluation:         Pt. Alert, confused, Pt. Sleepy during this shift, but Pt. Using nurse call light at times as asking to use urinal, turning off light, no agitated, 02 sats 94 to 95% on room air, v/s stable with High blood pressures ,  medicines given for [place] blood pressures x2 as prn ordered, last checking blood pressure 156/91 at 0320, no s/s acute distress noted until this time

## 2024-04-09 NOTE — CONSULTS
"Neurology Consult Note    Consult Date: 4/9/2024    Referring MD: Cole Espinoza VI, *    Reason for Consult I have been asked to see the patient in neurological consultation to render advice and opinion regarding altered mental status    Stephane Jaimes is a 53 y.o. male with past medical history of alcohol abuse who presented to the emergency room after being found unresponsive in his car by EMS on 4/7/2024.  Given Narcan with improvement of his symptoms but he also started having vomiting and there was some concern for aspiration.  Hospital reports he has a long history of alcohol abuse and drinks alcohol daily.    When I went to evaluate the patient yesterday he was altered and also had persistent Rt gaze preference with left side neglect and slight weakness on the left UE. Unknown last known normal.     No family at bedside to provide more clinical history         History reviewed. No pertinent past medical history.    Exam  BP (!) 190/96 (BP Location: Right arm, Patient Position: Lying)   Pulse 66   Temp 98.4 °F (36.9 °C) (Oral)   Resp 18   Ht 170.2 cm (67\")   Wt 91.4 kg (201 lb 8 oz)   SpO2 98%   BMI 31.56 kg/m²   Gen: NAD, vitals reviewed  MS: Awake and alert, not following commands  CN: visual acuity grossly normal, PERRL, Rt gaze preference , no facial droop, mild dysarthria  Motor: Left UE 4+/5 and rest of the extremities 5/5  Left sided neglect     DATA:    Lab Results   Component Value Date    GLUCOSE 141 (H) 04/09/2024    CALCIUM 8.5 (L) 04/09/2024     (L) 04/09/2024    K 3.2 (L) 04/09/2024    CO2 18.6 (L) 04/09/2024    CL 96 (L) 04/09/2024    BUN 9 04/09/2024    CREATININE 0.63 (L) 04/09/2024    BCR 14.3 04/09/2024    ANIONGAP 15.4 (H) 04/09/2024     Lab Results   Component Value Date    WBC 12.25 (H) 04/09/2024    HGB 15.1 04/09/2024    HCT 43.6 04/09/2024    MCV 97.1 (H) 04/09/2024     04/09/2024       Lab review:   Sodium 130  Creatinine 0.63  Platelets 140  Normal " LFTs    Lactate 2.2 4/8/2024  WBC 12.25  Hemoglobin 15 and platelets 266    Urinalysis negative  Urine tox positive only for ethanol 170    Imaging review:     CT T scan on admission for 4/6/24 showed no acute hypodensity or hypodensity    CTA Head and Neck - Rt veretebral hypoplastic and filling defect on the left vertebral with evidence of possible thrombus, good flow throw Left vertebral     CTP- No core or penumbra    TTE pending     Diagnoses:  Acute Left cerebellar ischemic stroke with brain compression   Acute metabolic encephalopathy  Alcohol abuse  Hypertension on amlodipine  Concern for aspiration pneumonia on ceftriaxone    1.Acute Left cerebellar ischemic stroke with brain compression   Out of the window for TNK   Completed infarct on CT Head so no intervention   -Continue Aspirin 325 mg daily and Lipitor 80 mg daily   - On 3% Hypertonic saline, after initial bolus of 2ml/kg, rate at 40cc/hr and last   - MRI and MRA neck w/wo pending   - Possible non occlusive thrombus on the left vertebral and cannot AC right now due to the high risk of bleed from the huge left cerebellar stroke  - KOSTA on board for Crani watch   - Repeat CT Head this AM stable      2. Alcohol Withdrawal   - On CIWA protocol   -Continue IV vitamins and IV thiamine replacement  -Provided substance abuse counseling at bedside        MDM   Reviewed: Previous charts, nursing notes and vitals   Reviewed: Previous labs and CT/CTA/CTP scan    Interpretation: Labs and CT/CTA/CTP scan   Total time providing Critical care is :30-74 minutes. This excluded time spent performing separately reportable procedures and services  Consults :Neurology/Stroke    Please note that portions of this note were completed with a voice recognition program.     Cheng Camacho MD  Neuro Hospitalist /Vascular Neurology.

## 2024-04-10 ENCOUNTER — APPOINTMENT (OUTPATIENT)
Dept: CT IMAGING | Facility: HOSPITAL | Age: 54
End: 2024-04-10

## 2024-04-10 ENCOUNTER — APPOINTMENT (OUTPATIENT)
Dept: MRI IMAGING | Facility: HOSPITAL | Age: 54
End: 2024-04-10

## 2024-04-10 PROBLEM — I63.9 CEREBELLAR STROKE: Status: ACTIVE | Noted: 2024-04-10

## 2024-04-10 PROBLEM — G93.6 CEREBRAL EDEMA: Status: ACTIVE | Noted: 2024-04-10

## 2024-04-10 LAB
ANION GAP SERPL CALCULATED.3IONS-SCNC: 10.1 MMOL/L (ref 5–15)
ANION GAP SERPL CALCULATED.3IONS-SCNC: 11 MMOL/L (ref 5–15)
ANION GAP SERPL CALCULATED.3IONS-SCNC: 9 MMOL/L (ref 5–15)
BUN SERPL-MCNC: 11 MG/DL (ref 6–20)
BUN SERPL-MCNC: 12 MG/DL (ref 6–20)
BUN SERPL-MCNC: 12 MG/DL (ref 6–20)
BUN/CREAT SERPL: 12.4 (ref 7–25)
BUN/CREAT SERPL: 17.4 (ref 7–25)
BUN/CREAT SERPL: 19.7 (ref 7–25)
CALCIUM SPEC-SCNC: 8.2 MG/DL (ref 8.6–10.5)
CALCIUM SPEC-SCNC: 8.4 MG/DL (ref 8.6–10.5)
CALCIUM SPEC-SCNC: 8.4 MG/DL (ref 8.6–10.5)
CHLORIDE SERPL-SCNC: 103 MMOL/L (ref 98–107)
CHLORIDE SERPL-SCNC: 106 MMOL/L (ref 98–107)
CHLORIDE SERPL-SCNC: 106 MMOL/L (ref 98–107)
CO2 SERPL-SCNC: 19.9 MMOL/L (ref 22–29)
CO2 SERPL-SCNC: 20 MMOL/L (ref 22–29)
CO2 SERPL-SCNC: 22 MMOL/L (ref 22–29)
CREAT SERPL-MCNC: 0.61 MG/DL (ref 0.76–1.27)
CREAT SERPL-MCNC: 0.69 MG/DL (ref 0.76–1.27)
CREAT SERPL-MCNC: 0.89 MG/DL (ref 0.76–1.27)
D DIMER PPP FEU-MCNC: <0.27 MCGFEU/ML (ref 0–0.51)
DEPRECATED RDW RBC AUTO: 45.4 FL (ref 37–54)
EGFRCR SERPLBLD CKD-EPI 2021: 103.8 ML/MIN/1.73
EGFRCR SERPLBLD CKD-EPI 2021: 112 ML/MIN/1.73
EGFRCR SERPLBLD CKD-EPI 2021: 116.3 ML/MIN/1.73
ERYTHROCYTE [DISTWIDTH] IN BLOOD BY AUTOMATED COUNT: 12.8 % (ref 12.3–15.4)
FACTOR II, DNA ANALYSIS: NORMAL
GLUCOSE SERPL-MCNC: 104 MG/DL (ref 65–99)
GLUCOSE SERPL-MCNC: 112 MG/DL (ref 65–99)
GLUCOSE SERPL-MCNC: 97 MG/DL (ref 65–99)
HCT VFR BLD AUTO: 43.9 % (ref 37.5–51)
HCYS SERPL-MCNC: 9.1 UMOL/L (ref 0–15)
HGB BLD-MCNC: 14.6 G/DL (ref 13–17.7)
MCH RBC QN AUTO: 32 PG (ref 26.6–33)
MCHC RBC AUTO-ENTMCNC: 33.3 G/DL (ref 31.5–35.7)
MCV RBC AUTO: 96.3 FL (ref 79–97)
OSMOLALITY SERPL: 277 MOSM/KG (ref 275–300)
OSMOLALITY SERPL: 279 MOSM/KG (ref 275–300)
OSMOLALITY SERPL: 283 MOSM/KG (ref 275–300)
OSMOLALITY SERPL: 284 MOSM/KG (ref 275–300)
PLATELET # BLD AUTO: 279 10*3/MM3 (ref 140–450)
PMV BLD AUTO: 9.1 FL (ref 6–12)
POTASSIUM SERPL-SCNC: 3.5 MMOL/L (ref 3.5–5.2)
POTASSIUM SERPL-SCNC: 3.5 MMOL/L (ref 3.5–5.2)
POTASSIUM SERPL-SCNC: 3.6 MMOL/L (ref 3.5–5.2)
POTASSIUM SERPL-SCNC: 3.7 MMOL/L (ref 3.5–5.2)
RBC # BLD AUTO: 4.56 10*6/MM3 (ref 4.14–5.8)
SODIUM SERPL-SCNC: 132 MMOL/L (ref 136–145)
SODIUM SERPL-SCNC: 134 MMOL/L (ref 136–145)
SODIUM SERPL-SCNC: 134 MMOL/L (ref 136–145)
SODIUM SERPL-SCNC: 136 MMOL/L (ref 136–145)
SODIUM SERPL-SCNC: 137 MMOL/L (ref 136–145)
SODIUM SERPL-SCNC: 137 MMOL/L (ref 136–145)
SODIUM SERPL-SCNC: 138 MMOL/L (ref 136–145)
WBC NRBC COR # BLD AUTO: 11.5 10*3/MM3 (ref 3.4–10.8)

## 2024-04-10 PROCEDURE — 0 GADOBENATE DIMEGLUMINE 529 MG/ML SOLUTION: Performed by: STUDENT IN AN ORGANIZED HEALTH CARE EDUCATION/TRAINING PROGRAM

## 2024-04-10 PROCEDURE — 25010000002 POTASSIUM CHLORIDE 10 MEQ/100ML SOLUTION: Performed by: STUDENT IN AN ORGANIZED HEALTH CARE EDUCATION/TRAINING PROGRAM

## 2024-04-10 PROCEDURE — 25010000002 HYDRALAZINE PER 20 MG: Performed by: HOSPITALIST

## 2024-04-10 PROCEDURE — 70553 MRI BRAIN STEM W/O & W/DYE: CPT

## 2024-04-10 PROCEDURE — 84132 ASSAY OF SERUM POTASSIUM: CPT | Performed by: STUDENT IN AN ORGANIZED HEALTH CARE EDUCATION/TRAINING PROGRAM

## 2024-04-10 PROCEDURE — 81240 F2 GENE: CPT | Performed by: STUDENT IN AN ORGANIZED HEALTH CARE EDUCATION/TRAINING PROGRAM

## 2024-04-10 PROCEDURE — 85300 ANTITHROMBIN III ACTIVITY: CPT | Performed by: STUDENT IN AN ORGANIZED HEALTH CARE EDUCATION/TRAINING PROGRAM

## 2024-04-10 PROCEDURE — 85305 CLOT INHIBIT PROT S TOTAL: CPT | Performed by: STUDENT IN AN ORGANIZED HEALTH CARE EDUCATION/TRAINING PROGRAM

## 2024-04-10 PROCEDURE — 83930 ASSAY OF BLOOD OSMOLALITY: CPT | Performed by: STUDENT IN AN ORGANIZED HEALTH CARE EDUCATION/TRAINING PROGRAM

## 2024-04-10 PROCEDURE — 85732 THROMBOPLASTIN TIME PARTIAL: CPT | Performed by: STUDENT IN AN ORGANIZED HEALTH CARE EDUCATION/TRAINING PROGRAM

## 2024-04-10 PROCEDURE — 70549 MR ANGIOGRAPH NECK W/O&W/DYE: CPT

## 2024-04-10 PROCEDURE — 99231 SBSQ HOSP IP/OBS SF/LOW 25: CPT | Performed by: NURSE PRACTITIONER

## 2024-04-10 PROCEDURE — 99255 IP/OBS CONSLTJ NEW/EST HI 80: CPT | Performed by: STUDENT IN AN ORGANIZED HEALTH CARE EDUCATION/TRAINING PROGRAM

## 2024-04-10 PROCEDURE — 85306 CLOT INHIBIT PROT S FREE: CPT | Performed by: STUDENT IN AN ORGANIZED HEALTH CARE EDUCATION/TRAINING PROGRAM

## 2024-04-10 PROCEDURE — 85220 BLOOC CLOT FACTOR V TEST: CPT | Performed by: STUDENT IN AN ORGANIZED HEALTH CARE EDUCATION/TRAINING PROGRAM

## 2024-04-10 PROCEDURE — 92610 EVALUATE SWALLOWING FUNCTION: CPT | Performed by: SPEECH-LANGUAGE PATHOLOGIST

## 2024-04-10 PROCEDURE — 86148 ANTI-PHOSPHOLIPID ANTIBODY: CPT | Performed by: STUDENT IN AN ORGANIZED HEALTH CARE EDUCATION/TRAINING PROGRAM

## 2024-04-10 PROCEDURE — 80048 BASIC METABOLIC PNL TOTAL CA: CPT | Performed by: STUDENT IN AN ORGANIZED HEALTH CARE EDUCATION/TRAINING PROGRAM

## 2024-04-10 PROCEDURE — 85670 THROMBIN TIME PLASMA: CPT | Performed by: STUDENT IN AN ORGANIZED HEALTH CARE EDUCATION/TRAINING PROGRAM

## 2024-04-10 PROCEDURE — 85705 THROMBOPLASTIN INHIBITION: CPT | Performed by: STUDENT IN AN ORGANIZED HEALTH CARE EDUCATION/TRAINING PROGRAM

## 2024-04-10 PROCEDURE — 70544 MR ANGIOGRAPHY HEAD W/O DYE: CPT

## 2024-04-10 PROCEDURE — 85302 CLOT INHIBIT PROT C ANTIGEN: CPT | Performed by: STUDENT IN AN ORGANIZED HEALTH CARE EDUCATION/TRAINING PROGRAM

## 2024-04-10 PROCEDURE — 25010000002 POTASSIUM CHLORIDE 10 MEQ/100ML SOLUTION: Performed by: HOSPITALIST

## 2024-04-10 PROCEDURE — 84295 ASSAY OF SERUM SODIUM: CPT | Performed by: STUDENT IN AN ORGANIZED HEALTH CARE EDUCATION/TRAINING PROGRAM

## 2024-04-10 PROCEDURE — 70450 CT HEAD/BRAIN W/O DYE: CPT

## 2024-04-10 PROCEDURE — 25010000002 CEFTRIAXONE PER 250 MG: Performed by: HOSPITALIST

## 2024-04-10 PROCEDURE — 80048 BASIC METABOLIC PNL TOTAL CA: CPT | Performed by: INTERNAL MEDICINE

## 2024-04-10 PROCEDURE — 25010000002 THIAMINE HCL 200 MG/2ML SOLUTION: Performed by: NURSE PRACTITIONER

## 2024-04-10 PROCEDURE — 81241 F5 GENE: CPT | Performed by: STUDENT IN AN ORGANIZED HEALTH CARE EDUCATION/TRAINING PROGRAM

## 2024-04-10 PROCEDURE — 86147 CARDIOLIPIN ANTIBODY EA IG: CPT | Performed by: STUDENT IN AN ORGANIZED HEALTH CARE EDUCATION/TRAINING PROGRAM

## 2024-04-10 PROCEDURE — 85027 COMPLETE CBC AUTOMATED: CPT | Performed by: INTERNAL MEDICINE

## 2024-04-10 PROCEDURE — 85379 FIBRIN DEGRADATION QUANT: CPT | Performed by: STUDENT IN AN ORGANIZED HEALTH CARE EDUCATION/TRAINING PROGRAM

## 2024-04-10 PROCEDURE — 25810000003 SODIUM CHLORIDE 3 % SOLUTION: Performed by: STUDENT IN AN ORGANIZED HEALTH CARE EDUCATION/TRAINING PROGRAM

## 2024-04-10 PROCEDURE — 86146 BETA-2 GLYCOPROTEIN ANTIBODY: CPT | Performed by: STUDENT IN AN ORGANIZED HEALTH CARE EDUCATION/TRAINING PROGRAM

## 2024-04-10 PROCEDURE — 85613 RUSSELL VIPER VENOM DILUTED: CPT | Performed by: STUDENT IN AN ORGANIZED HEALTH CARE EDUCATION/TRAINING PROGRAM

## 2024-04-10 PROCEDURE — 85303 CLOT INHIBIT PROT C ACTIVITY: CPT | Performed by: STUDENT IN AN ORGANIZED HEALTH CARE EDUCATION/TRAINING PROGRAM

## 2024-04-10 PROCEDURE — A9577 INJ MULTIHANCE: HCPCS | Performed by: STUDENT IN AN ORGANIZED HEALTH CARE EDUCATION/TRAINING PROGRAM

## 2024-04-10 PROCEDURE — 83090 ASSAY OF HOMOCYSTEINE: CPT | Performed by: STUDENT IN AN ORGANIZED HEALTH CARE EDUCATION/TRAINING PROGRAM

## 2024-04-10 PROCEDURE — 86038 ANTINUCLEAR ANTIBODIES: CPT | Performed by: STUDENT IN AN ORGANIZED HEALTH CARE EDUCATION/TRAINING PROGRAM

## 2024-04-10 RX ORDER — ASPIRIN 325 MG
325 TABLET ORAL DAILY
Status: DISCONTINUED | OUTPATIENT
Start: 2024-04-10 | End: 2024-04-13 | Stop reason: SDUPTHER

## 2024-04-10 RX ORDER — ASPIRIN 325 MG
325 TABLET, DELAYED RELEASE (ENTERIC COATED) ORAL DAILY
Status: DISCONTINUED | OUTPATIENT
Start: 2024-04-10 | End: 2024-04-10

## 2024-04-10 RX ORDER — POTASSIUM CHLORIDE 7.45 MG/ML
10 INJECTION INTRAVENOUS
Status: COMPLETED | OUTPATIENT
Start: 2024-04-10 | End: 2024-04-10

## 2024-04-10 RX ORDER — POTASSIUM CHLORIDE 1.5 G/1.58G
40 POWDER, FOR SOLUTION ORAL EVERY 4 HOURS
Status: COMPLETED | OUTPATIENT
Start: 2024-04-10 | End: 2024-04-11

## 2024-04-10 RX ORDER — ASPIRIN 300 MG/1
300 SUPPOSITORY RECTAL DAILY
Status: DISCONTINUED | OUTPATIENT
Start: 2024-04-10 | End: 2024-04-13 | Stop reason: SDUPTHER

## 2024-04-10 RX ORDER — ATORVASTATIN CALCIUM 80 MG/1
80 TABLET, FILM COATED ORAL NIGHTLY
Status: DISCONTINUED | OUTPATIENT
Start: 2024-04-10 | End: 2024-04-17 | Stop reason: HOSPADM

## 2024-04-10 RX ORDER — 3% SODIUM CHLORIDE 3 G/100ML
50 INJECTION, SOLUTION INTRAVENOUS CONTINUOUS
Status: DISPENSED | OUTPATIENT
Start: 2024-04-10 | End: 2024-04-11

## 2024-04-10 RX ADMIN — THIAMINE HYDROCHLORIDE 200 MG: 100 INJECTION, SOLUTION INTRAMUSCULAR; INTRAVENOUS at 22:03

## 2024-04-10 RX ADMIN — ASPIRIN 300 MG: 300 SUPPOSITORY RECTAL at 13:36

## 2024-04-10 RX ADMIN — THIAMINE HYDROCHLORIDE 200 MG: 100 INJECTION, SOLUTION INTRAMUSCULAR; INTRAVENOUS at 05:34

## 2024-04-10 RX ADMIN — GADOBENATE DIMEGLUMINE 18 ML: 529 INJECTION, SOLUTION INTRAVENOUS at 17:10

## 2024-04-10 RX ADMIN — ATORVASTATIN CALCIUM 80 MG: 80 TABLET, FILM COATED ORAL at 20:04

## 2024-04-10 RX ADMIN — HYDRALAZINE HYDROCHLORIDE 10 MG: 20 INJECTION INTRAMUSCULAR; INTRAVENOUS at 14:54

## 2024-04-10 RX ADMIN — POTASSIUM CHLORIDE 10 MEQ: 7.46 INJECTION, SOLUTION INTRAVENOUS at 02:02

## 2024-04-10 RX ADMIN — HYDRALAZINE HYDROCHLORIDE 10 MG: 20 INJECTION INTRAMUSCULAR; INTRAVENOUS at 06:35

## 2024-04-10 RX ADMIN — POTASSIUM CHLORIDE 40 MEQ: 1.5 POWDER, FOR SOLUTION ORAL at 22:03

## 2024-04-10 RX ADMIN — POTASSIUM CHLORIDE 10 MEQ: 7.46 INJECTION, SOLUTION INTRAVENOUS at 08:15

## 2024-04-10 RX ADMIN — POTASSIUM CHLORIDE 10 MEQ: 7.46 INJECTION, SOLUTION INTRAVENOUS at 01:08

## 2024-04-10 RX ADMIN — POTASSIUM CHLORIDE 10 MEQ: 7.46 INJECTION, SOLUTION INTRAVENOUS at 10:24

## 2024-04-10 RX ADMIN — THIAMINE HYDROCHLORIDE 200 MG: 100 INJECTION, SOLUTION INTRAMUSCULAR; INTRAVENOUS at 13:41

## 2024-04-10 RX ADMIN — SODIUM CHLORIDE 50 ML/HR: 3 INJECTION, SOLUTION INTRAVENOUS at 14:56

## 2024-04-10 RX ADMIN — POTASSIUM CHLORIDE 10 MEQ: 7.46 INJECTION, SOLUTION INTRAVENOUS at 11:22

## 2024-04-10 RX ADMIN — POTASSIUM CHLORIDE 10 MEQ: 7.46 INJECTION, SOLUTION INTRAVENOUS at 09:20

## 2024-04-10 RX ADMIN — CEFTRIAXONE 2000 MG: 2 INJECTION, POWDER, FOR SOLUTION INTRAMUSCULAR; INTRAVENOUS at 09:29

## 2024-04-10 RX ADMIN — SODIUM CHLORIDE 40 ML/HR: 3 INJECTION, SOLUTION INTRAVENOUS at 03:50

## 2024-04-10 NOTE — NURSING NOTE
Patient transferred to ICU from [facility] due to neuro change. Patient has right sided eye deviation. Patient started on 3% saline at 30ml/hr. Pt CT scan showed left cerebellar stroke. Patient calm. Will continue to monitor.

## 2024-04-10 NOTE — PROGRESS NOTES
[facility] Acute Inpt Rehab Note     Referral received via stroke order set.  Please note this is a screening only, rehab admissions will not actively be evaluating this patient.  If felt patient is appropriate for our services once therapies begin, please call our office at [phone], to initiate a full referral.    Thank you,   Anahi Ramsey RN   Rehab Admission Nurse

## 2024-04-10 NOTE — PROGRESS NOTES
"[facility] Center f/u d/t ETOH/SI. Chart reviewed, spoke w/ RN and met w/ pt. RN reports pt having no issues today. CD counselor has given pt resources for ETOH already. Used  ipad ([facility] #[id number]). Pt sleeping upon entry, woke to voice. Pt A&Ox4. Pt rates anxiety \"2/10\" and depression \"5/10\" today (10 being the worst on both scales). Pt denies SI/HI, hallucinations or wish to be dead. No other needs at this time.   ACCESS following.   "

## 2024-04-10 NOTE — PROGRESS NOTES
"     LOS: 0 days   Patient Care Team:  System, Provider Not In as PCP - General    Chief Complaint: Headache, cerebral edema        Subjective       Interval History: Remains in ICU. Neurosurgery consulted for cerebellar stroke, cerebral edema. Today's visit is for follow up however the patient and his medical problems are new to me as of today. The patient endorses headache and points to the top of the head as the location of pain.       History taken from: patient chart    Objective        Vital Signs  Temp:  [97.5 °F (36.4 °C)-99.4 °F (37.4 °C)] 97.5 °F (36.4 °C)  Heart Rate:  [65-97] 93  Resp:  [11] 11  BP: (134-173)/() 163/94    Physical Exam:     Dozing. Arouses easily to verbal stimuli. Stated name.   Face symmetric. Tongue midline.   Exhibits R gaze preference. PERRL. No nystagmus. + Photophobia.  Turns head to left when approached on left side. Avoids looking left toward window in room - \"hurts my eyes.\"  Mimics commands  x 4 extremities. No pronator drift. No dysmetria.        Results Review:     I reviewed the patient's new clinical results.  I reviewed the patient's new imaging results and agree with the interpretation.  Discussed with Dr. Garsia    CT HEAD WO CONTRAST 04/10/2024    COMPARISON:    4 9 2024     FINDINGS:    Relatively stable appearance to acute edema involving the cerebellum, lower vermis and most of the left cerebellum. There is significant mass   effect with diffuse effacement of the cerebellar sulci and compression of   the fourth ventricle. A small portion of the edematous left cerebellar tonsil extends 3 mm below the foramen magnum. There is relatively stable   dilatation of the bilateral lateral ventricles and third ventricle. There is no acute intracranial hemorrhage.     IMPRESSION:       Relatively stable appearance to extensive infarct involving the left cerebellum and cerebellar vermis with associated mass effect with mild   ventriculomegaly.  No evidence of acute " intracranial hemorrhage.      Assessment & Plan       Alcohol intoxication    Transaminitis    HTN (hypertension)    Cerebellar stroke    Cerebral edema    Discussed above exam findings and head CT findings with Dr. Garsia. Continue observation for hydrocephalus.  Re check head CT in am.     Anyi Rice, APRN  04/10/24  15:38 EDT

## 2024-04-10 NOTE — THERAPY EVALUATION
Acute Care - Speech Language Pathology   Swallow Initial Evaluation [facility]     Patient Name: Stephane Jaimes  : 1970  MRN: [id number]  Today's Date: 4/10/2024               Admit Date: 2024    Visit Dx:     ICD-10-CM ICD-9-CM   1. Alcoholic intoxication without complication  F10.920 305.00   2. Transaminitis  R74.01 790.4   3. High anion gap metabolic acidosis  E87.29 276.2     Patient Active Problem List   Diagnosis    Alcohol intoxication    Transaminitis    HTN (hypertension)    Cerebellar stroke    Cerebral edema     History reviewed. No pertinent past medical history.  History reviewed. No pertinent surgical history.    SLP Recommendation and Plan  SLP Swallowing Diagnosis: R/O pharyngeal dysphagia (04/10/24 1500)  SLP Diet Recommendation: puree, nectar thick liquids (04/10/24 1500)  Recommended Precautions and Strategies: upright posture during/after eating, small bites of food and sips of liquid, general aspiration precautions (04/10/24 1500)  SLP Rec. for Method of Medication Administration: meds whole, meds crushed, with puree (04/10/24 1500)     Monitor for Signs of Aspiration: yes, notify SLP if any concerns (04/10/24 1500)  Recommended Diagnostics: VFSS (MBS) (04/10/24 1500)  Swallow Criteria for Skilled Therapeutic Interventions Met: demonstrates skilled criteria (04/10/24 1500)  Anticipated Discharge Disposition (SLP): unknown (04/10/24 1500)  Rehab Potential/Prognosis, Swallowing: good, to achieve stated therapy goals (04/10/24 1500)  Therapy Frequency (Swallow): PRN (04/10/24 1500)  Predicted Duration Therapy Intervention (Days): until discharge (04/10/24 1500)  Oral Care Recommendations: Oral Care BID/PRN (04/10/24 1500)                                        Outcome Evaluation: Clinical swallow evaluation completed recommend puree and NTL, meds whole or crushed with puree and small bites and sips. VFSS .      SWALLOW EVALUATION (Last 72 Hours)       SLP Adult Swallow Evaluation        Row Name 04/10/24 1500                   Rehab Evaluation    Document Type evaluation  -KA        Subjective Information no complaints  -KA        Patient Observations alert;cooperative  -KA        Patient Effort good  -KA        Symptoms Noted During/After Treatment none  -KA           General Information    Patient Profile Reviewed yes  -KA        Pertinent History Of Current Problem found unresponsive alcohol abuse, left cerebellar stroke  -KA        Current Method of Nutrition NPO  -KA        Precautions/Limitations, Vision difficult to assess  -KA        Precautions/Limitations, Hearing WFL;for purposes of eval  -KA        Prior Level of Function-Communication WFL  -KA        Prior Level of Function-Swallowing no diet consistency restrictions  -KA        Plans/Goals Discussed with patient  -KA        Barriers to Rehab language barrier  speaks some english  -KA        Patient's Goals for Discharge no concerns voiced  -KA           Oral Motor Structure and Function    Dentition Assessment natural, present and adequate  -KA        Secretion Management WNL/WFL  -KA        Mucosal Quality moist, healthy  -KA           Oral Musculature and Cranial Nerve Assessment    Oral Motor General Assessment WFL  -KA           Clinical Swallow Eval    Clinical Swallow Evaluation Summary Patient demonstrated overt cough on second ice chip trial. Patient demonstrated audible swallow with thin liquids suspect mistiming. No overt s/s of pen/asp with NTL via spoon and cup and puree consistencies. Pt demonstrated occasional oral holding and delayed swallow initiation. Laryngeal elevation felt slightly reduced upon neck palpation.  -KA           SLP Evaluation Clinical Impression    SLP Swallowing Diagnosis R/O pharyngeal dysphagia  -KA        Functional Impact risk of aspiration/pneumonia  -KA        Rehab Potential/Prognosis, Swallowing good, to achieve stated therapy goals  -KA        Swallow Criteria for Skilled Therapeutic  Interventions Met demonstrates skilled criteria  -           Recommendations    Therapy Frequency (Swallow) PRN  -KA        Predicted Duration Therapy Intervention (Days) until discharge  -        SLP Diet Recommendation puree;nectar thick liquids  -        Recommended Diagnostics VFSS (MBS)  -        Recommended Precautions and Strategies upright posture during/after eating;small bites of food and sips of liquid;general aspiration precautions  -        Oral Care Recommendations Oral Care BID/PRN  -        SLP Rec. for Method of Medication Administration meds whole;meds crushed;with puree  -        Monitor for Signs of Aspiration yes;notify SLP if any concerns  -LINDA        Anticipated Discharge Disposition (SLP) unknown  -LINDA           Swallow Goals (SLP)    Swallow STGs diet tolerance goal selection (SLP)  -        Diet Tolerance Goal Selection (SLP) Swallow Short Term Goal 1  -KA           (STG) Swallow 1    (STG) Swallow 1 Patient will tolerate least restrictive diet without overt s/s of pen/asp  -KA        [place] (Swallow Short Term Goal 1) independently (over 90% accuracy)  -LINDA                  User Key  (r) = Recorded By, (t) = Taken By, (c) = Cosigned By      Initials Name Effective Dates    Joe Mcnamara SLP 01/05/24 -                     EDUCATION  The patient has been educated in the following areas:   Dysphagia (Swallowing Impairment).        SLP GOALS       Row Name 04/10/24 1500             (STG) Swallow 1    (STG) Swallow 1 Patient will tolerate least restrictive diet without overt s/s of pen/asp  -KA      [place] (Swallow Short Term Goal 1) independently (over 90% accuracy)  -LINDA                User Key  (r) = Recorded By, (t) = Taken By, (c) = Cosigned By      Initials Name Provider Type    Joe Mcnamara SLP Speech and Language Pathologist                       Time Calculation:    Time Calculation- SLP       Row Name 04/10/24 5497             Time Calculation- SLP     SLP Start Time 1330  -KA      SLP Received On 04/10/24  -KA         Untimed Charges    SLP Eval/Re-eval  ST Eval Oral Pharyng Swallow - 83402  -KA      [id number] Eval Oral Pharyng Swallow Minutes 60  -KA         Total Minutes    Untimed Charges Total Minutes 60  -KA       Total Minutes 60  -KA                User Key  (r) = Recorded By, (t) = Taken By, (c) = Cosigned By      Initials Name Provider Type    Joe Mcnamara SLP Speech and Language Pathologist                    Therapy Charges for Today       Code Description Service Date Service Provider Modifiers Qty    [id number]  ST EVAL ORAL PHARYNG SWALLOW 4 4/10/2024 Joe Wetzel SLP GN 1                 COSMO Britton  4/10/2024

## 2024-04-10 NOTE — PLAN OF CARE
Goal Outcome Evaluation:              Outcome Evaluation: Clinical swallow evaluation completed recommend puree and NTL, meds whole or crushed with puree and small bites and sips. VFSS 4/11.      Anticipated Discharge Disposition (SLP): unknown          SLP Swallowing Diagnosis: R/O pharyngeal dysphagia (04/10/24 1500)

## 2024-04-10 NOTE — PLAN OF CARE
Goal Outcome Evaluation:      VSS on RA, patient confused overnight, Oriented x1-2. PRN hydralazine given x1 per protocol. No c/o pain.  utilized if needed. Adequate UO, no BM. 3% gtt continues, increased to 40mL/hr per neurology. AM CT complete, awaiting results. Replaced K+ overnight. Free from falls and injuries

## 2024-04-10 NOTE — PLAN OF CARE
Goal Outcome Evaluation:         Patient remains in ICU. Patient potassium replaced now 3.7. Patient had MRI today and will have follow up CT scan in the morning. Patient diet upgraded to pureed with nectar thickened liquids. Patient can have medications in applesauce. Patient still on 3%NS at 50ml/hr. Patient last sodium was 137. Patient alert and oriented x3. Still monitoring for hydrocephalus.    Patient cousin/next of kin came to visit. His name is ivett stevens [phone]. He is to be contacted if any other information needed.

## 2024-04-10 NOTE — PROGRESS NOTES
[facility] INPATIENT PROGRESS NOTE         [facility] INTENSIVE CARE    4/10/2024      PATIENT IDENTIFICATION:  Name: Stephane Jaimes ADMIT: 2024   : 1970  PCP: System, Provider Not In    MRN: [id number] LOS: 0 days   AGE/SEX: 53 y.o. male  ROOM: [facility]                     LOS 0    Reason for visit: ICU medical management with subacute infarctions with mass effect hydrocephalus      SUBJECTIVE:      No new issues overnight.  His hypertonic saline infusing but not at goal due to being on LR as well.  LR was discontinued we will see how her values change today.  Discussed with nursing staff at bedside.  No new neurologic changes.    Objective   OBJECTIVE:    Vital Sign Min/Max for last 24 hours  Temp  Min: 97.6 °F (36.4 °C)  Max: 99.4 °F (37.4 °C)   BP  Min: 134/75  Max: 190/96   Pulse  Min: 65  Max: 97   Resp  Min: 11  Max: 18   SpO2  Min: 90 %  Max: 99 %   No data recorded   Weight  Min: 87.6 kg (193 lb 2 oz)  Max: 87.6 kg (193 lb 2 oz)    Vitals:    04/10/24 0635 04/10/24 0700 04/10/24 0749 04/10/24 0800   BP: 170/88 159/91  152/87   Pulse: 81 82  66   Resp:       Temp:   97.6 °F (36.4 °C)    TempSrc:   Oral    SpO2: 99% 97%  96%   Weight:       Height:                24  2119 24  0725 04/10/24  0420   Weight: 92.5 kg (204 lb) 91.4 kg (201 lb 8 oz) 87.6 kg (193 lb 2 oz)       Body mass index is 30.25 kg/m².                          Body mass index is 30.25 kg/m².    Intake/Output Summary (Last 24 hours) at 4/10/2024 0916  Last data filed at 4/10/2024 0600  Gross per 24 hour   Intake 1307 ml   Output 1225 ml   Net 82 ml         Exam:  GEN:  No distress, appears stated age  EYES:   PERRL, anicteric sclerae  ENT:    External ears/nose normal, OP clear  NECK:  No adenopathy, midline trachea  LUNGS: Normal chest on inspection, palpation and auscultation  CV:  Normal S1S2, without murmur  ABD:  Nontender, nondistended, no hepatosplenomegaly, +BS  EXT:  No edema.  No cyanosis or clubbing.  No  mottling and normal cap refill.    Assessment     Scheduled meds:  amLODIPine, 10 mg, Oral, Q24H  aspirin, 325 mg, Oral, Daily  cefTRIAXone, 2,000 mg, Intravenous, Daily  folic acid, 1 mg, Oral, Daily  multivitamin with minerals, 1 tablet, Oral, Daily  potassium chloride, 10 mEq, Intravenous, Q1H  thiamine (B-1) IV, 200 mg, Intravenous, Q8H   Followed by  [START ON 4/12/2024] thiamine, 100 mg, Oral, Daily      IV meds:                      sodium chloride, 30 mL/hr, Last Rate: 50 mL/hr (04/10/24 0649)      Data Review:  Results from last 7 days   Lab Units 04/10/24  0828 04/10/24  0557 04/09/24  2354 04/09/24 2027 04/09/24  1823 04/09/24  0302   SODIUM mmol/L 136 134*  134* 132* 131* 128*  128* 130*   POTASSIUM mmol/L 3.5 3.5  --  3.3* 3.3* 3.2*   CHLORIDE mmol/L 106 103  --  98 94* 96*   CO2 mmol/L 19.9* 20.0*  --  20.8* 21.0* 18.6*   BUN mg/dL 12 12  --  10 9 9   CREATININE mg/dL 0.61* 0.69*  --  0.66* 0.69* 0.63*   GLUCOSE mg/dL 104* 112*  --  126* 115* 141*   CALCIUM mg/dL 8.4* 8.4*  --  8.9 8.7 8.5*         Estimated Creatinine Clearance: 151.4 mL/min (A) (by C-G formula based on SCr of 0.61 mg/dL (L)).  Results from last 7 days   Lab Units 04/10/24  0557 04/09/24  0302 04/08/24 0227 04/06/24 2133   WBC 10*3/mm3 11.50* 12.25* 12.07* 10.94*   HEMOGLOBIN g/dL 14.6 15.1 14.7 14.4   PLATELETS 10*3/mm3 279 266 263 311     Results from last 7 days   Lab Units 04/06/24  2133   INR  1.01     Results from last 7 days   Lab Units 04/09/24  0302 04/06/24  2133   ALT (SGPT) U/L 32 47*   AST (SGOT) U/L 28 42*         Results from last 7 days   Lab Units 04/08/24  0227 04/07/24 2016 04/07/24  1421 04/07/24  1114 04/07/24  0806 04/07/24  0510 04/06/24  2133   PROCALCITONIN ng/mL  --   --   --   --   --   --  0.06   LACTATE mmol/L 2.2* 3.2* 2.3* 3.0* 3.8* 3.9*  --          Glucose   Date/Time Value Ref Range Status   04/09/2024 1727 123 70 - 130 mg/dL Final         Imaging reviewed  Repeat CT head 4/10 reviewed shows  stable appearance of extensive infarct involving left cerebellum with mass effect and mild ventriculomegaly.  No acute intracranial hemorrhage.      2D echo 4/9 reviewed: EF greater than 70%.  Normal diastolic function.          Microbiology reviewed  RVP negative.  MRSA screen negative.          Active Hospital Problems    Diagnosis  POA    **Alcohol intoxication [F10.929]  Yes    HTN (hypertension) [I10]  Unknown    Transaminitis [R74.01]  Unknown      Resolved Hospital Problems   No resolved problems to display.         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  LR discontinued this morning.  Discussed with nursing staff.  Discussed with pharmacy.  Control blood pressure.  Monitor for alcohol withdrawal.  Antibiotics for presumed aspiration pneumonia present on admission.  Control glucose.  Mechanical DVT prophylaxis until pharmacologic cleared by neurology/neurosurgery service.    Discussed with multidisciplinary ICU team on rounds this morning.        CCT; 33 min    Kirk Bee MD  Pulmonary and Critical Care Medicine  [facility]  4/10/2024    09:16 EDT

## 2024-04-11 ENCOUNTER — APPOINTMENT (OUTPATIENT)
Dept: GENERAL RADIOLOGY | Facility: HOSPITAL | Age: 54
End: 2024-04-11

## 2024-04-11 ENCOUNTER — APPOINTMENT (OUTPATIENT)
Dept: CT IMAGING | Facility: HOSPITAL | Age: 54
End: 2024-04-11

## 2024-04-11 LAB
ANA SER QL: NEGATIVE
ANION GAP SERPL CALCULATED.3IONS-SCNC: 10 MMOL/L (ref 5–15)
ANION GAP SERPL CALCULATED.3IONS-SCNC: 11 MMOL/L (ref 5–15)
ANION GAP SERPL CALCULATED.3IONS-SCNC: 11 MMOL/L (ref 5–15)
BUN SERPL-MCNC: 10 MG/DL (ref 6–20)
BUN SERPL-MCNC: 10 MG/DL (ref 6–20)
BUN SERPL-MCNC: 12 MG/DL (ref 6–20)
BUN/CREAT SERPL: 13 (ref 7–25)
BUN/CREAT SERPL: 13.5 (ref 7–25)
BUN/CREAT SERPL: 14.5 (ref 7–25)
CALCIUM SPEC-SCNC: 8.2 MG/DL (ref 8.6–10.5)
CALCIUM SPEC-SCNC: 8.3 MG/DL (ref 8.6–10.5)
CALCIUM SPEC-SCNC: 8.3 MG/DL (ref 8.6–10.5)
CARDIOLIPIN IGG SER IA-ACNC: <9 GPL U/ML (ref 0–14)
CARDIOLIPIN IGM SER IA-ACNC: <9 MPL U/ML (ref 0–12)
CHLORIDE SERPL-SCNC: 110 MMOL/L (ref 98–107)
CHLORIDE SERPL-SCNC: 111 MMOL/L (ref 98–107)
CHLORIDE SERPL-SCNC: 112 MMOL/L (ref 98–107)
CHOLEST SERPL-MCNC: 214 MG/DL (ref 0–200)
CO2 SERPL-SCNC: 19 MMOL/L (ref 22–29)
CO2 SERPL-SCNC: 20 MMOL/L (ref 22–29)
CO2 SERPL-SCNC: 20 MMOL/L (ref 22–29)
CREAT SERPL-MCNC: 0.74 MG/DL (ref 0.76–1.27)
CREAT SERPL-MCNC: 0.77 MG/DL (ref 0.76–1.27)
CREAT SERPL-MCNC: 0.83 MG/DL (ref 0.76–1.27)
D DIMER PPP FEU-MCNC: <0.27 MCGFEU/ML (ref 0–0.51)
DEPRECATED RDW RBC AUTO: 46 FL (ref 37–54)
EGFRCR SERPLBLD CKD-EPI 2021: 106 ML/MIN/1.73
EGFRCR SERPLBLD CKD-EPI 2021: 108.4 ML/MIN/1.73
EGFRCR SERPLBLD CKD-EPI 2021: 109.7 ML/MIN/1.73
ERYTHROCYTE [DISTWIDTH] IN BLOOD BY AUTOMATED COUNT: 12.7 % (ref 12.3–15.4)
GLUCOSE BLDC GLUCOMTR-MCNC: 119 MG/DL (ref 70–130)
GLUCOSE SERPL-MCNC: 103 MG/DL (ref 65–99)
GLUCOSE SERPL-MCNC: 123 MG/DL (ref 65–99)
GLUCOSE SERPL-MCNC: 94 MG/DL (ref 65–99)
HBA1C MFR BLD: 6 % (ref 4.8–5.6)
HCT VFR BLD AUTO: 42.5 % (ref 37.5–51)
HCYS SERPL-MCNC: 9.3 UMOL/L (ref 0–15)
HDLC SERPL-MCNC: 52 MG/DL (ref 40–60)
HGB BLD-MCNC: 14.5 G/DL (ref 13–17.7)
LDLC SERPL CALC-MCNC: 138 MG/DL (ref 0–100)
LDLC/HDLC SERPL: 2.6 {RATIO}
MCH RBC QN AUTO: 33.6 PG (ref 26.6–33)
MCHC RBC AUTO-ENTMCNC: 34.1 G/DL (ref 31.5–35.7)
MCV RBC AUTO: 98.4 FL (ref 79–97)
OSMOLALITY SERPL: 286 MOSM/KG (ref 275–300)
OSMOLALITY SERPL: 289 MOSM/KG (ref 275–300)
OSMOLALITY SERPL: 293 MOSM/KG (ref 275–300)
OSMOLALITY SERPL: 295 MOSM/KG (ref 275–300)
PLATELET # BLD AUTO: 277 10*3/MM3 (ref 140–450)
PMV BLD AUTO: 9 FL (ref 6–12)
POTASSIUM SERPL-SCNC: 3.6 MMOL/L (ref 3.5–5.2)
POTASSIUM SERPL-SCNC: 3.9 MMOL/L (ref 3.5–5.2)
POTASSIUM SERPL-SCNC: 4 MMOL/L (ref 3.5–5.2)
POTASSIUM SERPL-SCNC: 4 MMOL/L (ref 3.5–5.2)
RBC # BLD AUTO: 4.32 10*6/MM3 (ref 4.14–5.8)
SODIUM SERPL-SCNC: 139 MMOL/L (ref 136–145)
SODIUM SERPL-SCNC: 140 MMOL/L (ref 136–145)
SODIUM SERPL-SCNC: 141 MMOL/L (ref 136–145)
SODIUM SERPL-SCNC: 141 MMOL/L (ref 136–145)
SODIUM SERPL-SCNC: 143 MMOL/L (ref 136–145)
TRIGL SERPL-MCNC: 134 MG/DL (ref 0–150)
VLDLC SERPL-MCNC: 24 MG/DL (ref 5–40)
WBC NRBC COR # BLD AUTO: 9.78 10*3/MM3 (ref 3.4–10.8)

## 2024-04-11 PROCEDURE — 85379 FIBRIN DEGRADATION QUANT: CPT | Performed by: STUDENT IN AN ORGANIZED HEALTH CARE EDUCATION/TRAINING PROGRAM

## 2024-04-11 PROCEDURE — 97530 THERAPEUTIC ACTIVITIES: CPT

## 2024-04-11 PROCEDURE — 92611 MOTION FLUOROSCOPY/SWALLOW: CPT

## 2024-04-11 PROCEDURE — 80061 LIPID PANEL: CPT | Performed by: STUDENT IN AN ORGANIZED HEALTH CARE EDUCATION/TRAINING PROGRAM

## 2024-04-11 PROCEDURE — 84295 ASSAY OF SERUM SODIUM: CPT | Performed by: STUDENT IN AN ORGANIZED HEALTH CARE EDUCATION/TRAINING PROGRAM

## 2024-04-11 PROCEDURE — 99232 SBSQ HOSP IP/OBS MODERATE 35: CPT | Performed by: STUDENT IN AN ORGANIZED HEALTH CARE EDUCATION/TRAINING PROGRAM

## 2024-04-11 PROCEDURE — 74230 X-RAY XM SWLNG FUNCJ C+: CPT

## 2024-04-11 PROCEDURE — 82948 REAGENT STRIP/BLOOD GLUCOSE: CPT

## 2024-04-11 PROCEDURE — 83930 ASSAY OF BLOOD OSMOLALITY: CPT | Performed by: STUDENT IN AN ORGANIZED HEALTH CARE EDUCATION/TRAINING PROGRAM

## 2024-04-11 PROCEDURE — 80048 BASIC METABOLIC PNL TOTAL CA: CPT | Performed by: INTERNAL MEDICINE

## 2024-04-11 PROCEDURE — 25010000002 THIAMINE PER 100 MG: Performed by: NURSE PRACTITIONER

## 2024-04-11 PROCEDURE — 97167 OT EVAL HIGH COMPLEX 60 MIN: CPT

## 2024-04-11 PROCEDURE — 84132 ASSAY OF SERUM POTASSIUM: CPT | Performed by: STUDENT IN AN ORGANIZED HEALTH CARE EDUCATION/TRAINING PROGRAM

## 2024-04-11 PROCEDURE — 94761 N-INVAS EAR/PLS OXIMETRY MLT: CPT

## 2024-04-11 PROCEDURE — 94799 UNLISTED PULMONARY SVC/PX: CPT

## 2024-04-11 PROCEDURE — 83090 ASSAY OF HOMOCYSTEINE: CPT | Performed by: STUDENT IN AN ORGANIZED HEALTH CARE EDUCATION/TRAINING PROGRAM

## 2024-04-11 PROCEDURE — 83036 HEMOGLOBIN GLYCOSYLATED A1C: CPT | Performed by: STUDENT IN AN ORGANIZED HEALTH CARE EDUCATION/TRAINING PROGRAM

## 2024-04-11 PROCEDURE — 97535 SELF CARE MNGMENT TRAINING: CPT

## 2024-04-11 PROCEDURE — 25010000002 CEFTRIAXONE PER 250 MG: Performed by: HOSPITALIST

## 2024-04-11 PROCEDURE — 25810000003 SODIUM CHLORIDE 3 % SOLUTION: Performed by: STUDENT IN AN ORGANIZED HEALTH CARE EDUCATION/TRAINING PROGRAM

## 2024-04-11 PROCEDURE — 25010000002 HYDRALAZINE PER 20 MG: Performed by: HOSPITALIST

## 2024-04-11 PROCEDURE — 70450 CT HEAD/BRAIN W/O DYE: CPT

## 2024-04-11 PROCEDURE — 80048 BASIC METABOLIC PNL TOTAL CA: CPT | Performed by: STUDENT IN AN ORGANIZED HEALTH CARE EDUCATION/TRAINING PROGRAM

## 2024-04-11 PROCEDURE — 97162 PT EVAL MOD COMPLEX 30 MIN: CPT

## 2024-04-11 PROCEDURE — 25010000002 THIAMINE HCL 200 MG/2ML SOLUTION: Performed by: NURSE PRACTITIONER

## 2024-04-11 PROCEDURE — 85027 COMPLETE CBC AUTOMATED: CPT | Performed by: INTERNAL MEDICINE

## 2024-04-11 PROCEDURE — 99231 SBSQ HOSP IP/OBS SF/LOW 25: CPT | Performed by: NURSE PRACTITIONER

## 2024-04-11 RX ORDER — POTASSIUM CHLORIDE 1.5 G/1.58G
40 POWDER, FOR SOLUTION ORAL EVERY 4 HOURS
Status: COMPLETED | OUTPATIENT
Start: 2024-04-11 | End: 2024-04-12

## 2024-04-11 RX ORDER — 3% SODIUM CHLORIDE 3 G/100ML
50 INJECTION, SOLUTION INTRAVENOUS CONTINUOUS
Status: DISCONTINUED | OUTPATIENT
Start: 2024-04-11 | End: 2024-04-12

## 2024-04-11 RX ADMIN — HYDRALAZINE HYDROCHLORIDE 10 MG: 20 INJECTION INTRAMUSCULAR; INTRAVENOUS at 16:53

## 2024-04-11 RX ADMIN — ASPIRIN 325 MG: 325 TABLET ORAL at 08:29

## 2024-04-11 RX ADMIN — THIAMINE HYDROCHLORIDE 200 MG: 100 INJECTION, SOLUTION INTRAMUSCULAR; INTRAVENOUS at 22:59

## 2024-04-11 RX ADMIN — Medication 1 TABLET: at 08:47

## 2024-04-11 RX ADMIN — SODIUM CHLORIDE 50 ML/HR: 3 INJECTION, SOLUTION INTRAVENOUS at 22:59

## 2024-04-11 RX ADMIN — AMLODIPINE BESYLATE 10 MG: 10 TABLET ORAL at 08:29

## 2024-04-11 RX ADMIN — BARIUM SULFATE 4 ML: 980 POWDER, FOR SUSPENSION ORAL at 10:11

## 2024-04-11 RX ADMIN — SODIUM CHLORIDE 50 ML/HR: 3 INJECTION, SOLUTION INTRAVENOUS at 01:50

## 2024-04-11 RX ADMIN — POTASSIUM CHLORIDE 40 MEQ: 1.5 POWDER, FOR SOLUTION ORAL at 01:50

## 2024-04-11 RX ADMIN — FOLIC ACID 1 MG: 1 TABLET ORAL at 08:29

## 2024-04-11 RX ADMIN — SODIUM CHLORIDE 50 ML/HR: 3 INJECTION, SOLUTION INTRAVENOUS at 13:20

## 2024-04-11 RX ADMIN — CEFTRIAXONE 2000 MG: 2 INJECTION, POWDER, FOR SOLUTION INTRAMUSCULAR; INTRAVENOUS at 08:42

## 2024-04-11 RX ADMIN — BARIUM SULFATE 50 ML: 400 SUSPENSION ORAL at 10:11

## 2024-04-11 RX ADMIN — THIAMINE HYDROCHLORIDE 200 MG: 100 INJECTION, SOLUTION INTRAMUSCULAR; INTRAVENOUS at 14:55

## 2024-04-11 RX ADMIN — THIAMINE HYDROCHLORIDE 200 MG: 100 INJECTION, SOLUTION INTRAMUSCULAR; INTRAVENOUS at 05:12

## 2024-04-11 RX ADMIN — ATORVASTATIN CALCIUM 80 MG: 80 TABLET, FILM COATED ORAL at 20:19

## 2024-04-11 NOTE — PROGRESS NOTES
ACCESS Center f/u d/t ETOH/SI. Chart reviewed and spoke w/ primary RN who reports pt has been sleeping most of the day and currently sleeping at this time. Primary RN believes sleeping may be d/t recent stroke and denied any behavioral/mental health issues throughout the shift. CIWA score 1 at 16:00 today. Clinician did not wake pt to allow rest. ACCESS following.

## 2024-04-11 NOTE — MBS/VFSS/FEES
Acute Care - Speech Language Pathology   Swallow Initial Evaluation [facility]     Patient Name: Stephane Jaimes  : 1970  MRN: [id number]  Today's Date: 2024               Admit Date: 2024    Visit Dx:     ICD-10-CM ICD-9-CM   1. Alcoholic intoxication without complication  F10.920 305.00   2. Transaminitis  R74.01 790.4   3. High anion gap metabolic acidosis  E87.29 276.2     Patient Active Problem List   Diagnosis    Alcohol intoxication    Transaminitis    HTN (hypertension)    Cerebellar stroke    Cerebral edema     History reviewed. No pertinent past medical history.  History reviewed. No pertinent surgical history.    SLP Recommendation and Plan  SLP Swallowing Diagnosis: moderate, oral dysphagia, pharyngeal dysphagia (24)  SLP Diet Recommendation: honey thick liquids, puree (24)  Recommended Precautions and Strategies: upright posture during/after eating, small bites of food and sips of liquid, general aspiration precautions (24)  SLP Rec. for Method of Medication Administration: meds whole, meds crushed, with puree (24)     Monitor for Signs of Aspiration: yes, notify SLP if any concerns (24)     Swallow Criteria for Skilled Therapeutic Interventions Met: demonstrates skilled criteria (24)  Anticipated Discharge Disposition (SLP): unknown (24)  Rehab Potential/Prognosis, Swallowing: good, to achieve stated therapy goals (24)  Therapy Frequency (Swallow): PRN (24)  Predicted Duration Therapy Intervention (Days): until discharge (24)  Oral Care Recommendations: Oral Care BID/PRN (24)                                        Plan of Care Reviewed With: patient  Outcome Evaluation: VFSS completed. Recommend puree and honey thick liquids, meds whole/crushed with puree. Recommend upright, slow rate, altenrate liquids/solids. Suspect patient mental status impacting swallow function.  Patient alert and upright for study, impulsive with intake of liquids.      SWALLOW EVALUATION (Last 72 Hours)       SLP Adult Swallow Evaluation       Row Name 04/11/24 0930 04/10/24 1500                Rehab Evaluation    Document Type evaluation  -OC evaluation  -KA       Subjective Information no complaints  -OC no complaints  -KA       Patient Observations alert;cooperative;agree to therapy  -OC alert;cooperative  -KA       Patient Effort good  -OC good  -KA       Symptoms Noted During/After Treatment none  -OC none  -KA          General Information    Patient Profile Reviewed yes  -OC yes  -KA       Pertinent History Of Current Problem -- found unresponsive alcohol abuse, left cerebellar stroke  -KA       Current Method of Nutrition pureed;nectar/syrup-thick liquids  -OC NPO  -KA       Precautions/Limitations, Vision difficult to assess  -OC difficult to assess  -KA       Precautions/Limitations, Hearing WFL;for purposes of eval  -OC WFL;for purposes of eval  -KA       Prior Level of Function-Communication WFL  -OC WFL  -KA       Prior Level of Function-Swallowing no diet consistency restrictions  -OC no diet consistency restrictions  -KA       Plans/Goals Discussed with patient  -OC patient  -KA       Barriers to Rehab -- language barrier  speaks some english  -KA       Patient's Goals for Discharge patient did not state  -OC no concerns voiced  -KA          Pain Scale: Numbers Pre/Post-Treatment    Pretreatment Pain Rating 0/10 - no pain  -OC --          Oral Motor Structure and Function    Dentition Assessment natural, present and adequate;missing teeth  -OC natural, present and adequate  -KA       Secretion Management WNL/WFL  -OC WNL/WFL  -KA       Mucosal Quality moist, healthy  -OC moist, healthy  -KA          Oral Musculature and Cranial Nerve Assessment    Oral Motor General Assessment -- WFL  -KA          Clinical Swallow Eval    Clinical Swallow Evaluation Summary -- Patient demonstrated overt cough  on second ice chip trial. Patient demonstrated audible swallow with thin liquids suspect mistiming. No overt s/s of pen/asp with NTL via spoon and cup and puree consistencies. Pt demonstrated occasional oral holding and delayed swallow initiation. Laryngeal elevation felt slightly reduced upon neck palpation.  -KA          MBS/VFSS Interpretation    VFSS Summary VFSS completed, Debo OCHOA present. Patient presents with moderate oropharyngeal dysphagia; suspect impacted my mental status. Patient demonstrated mistiming and reduced airway protection. Adequate hyolaryngeal elevation/excursion. Patient impulsive with intake of liquids. Patient demonstrated mistiming of nectar thick liquids, large consecutive sips. [place] aspiration nectar via cup, suspect secondary to impulsivity. Delayed coughing response, not effective in clearing aspirated materials. Patient demonstrated improved timing of honey thick liquids, independent with decreased bolus size of honey thick liquid. Oral residue honey thick, pooling to the vallecula clears with double swallow. No significant pharyngeal residue. Patient demonstrated prespill puree to the vallecula. Mild oral and pharyngeal residue, able to clear with double swallow. No penetration or aspiration puree. Patient demonstrated prolonged mastication mechanical soft, spillage to the pyriforms. Moderate oral residue, required honey thick liquid wash to clear.  -OC --          SLP Communication to Radiology    Summary Statement VFSS completed, Debo OCHOA present. Patient presents with moderate oropharyngeal dysphagia; suspect impacted my mental status. Patient demonstrated mistiming and reduced airway protection. Adequate hyolaryngeal elevation/excursion. Patient impulsive with intake of liquids. Patient demonstrated mistiming of nectar thick liquids, large consecutive sips. [place] aspiration nectar via cup, suspect secondary to impulsivity. Delayed coughing response, not effective  in clearing aspirated materials. Patient demonstrated improved timing of honey thick liquids, independent with decreased bolus size of honey thick liquid. Oral residue honey thick, pooling to the vallecula clears with double swallow. No significant pharyngeal residue. Patient demonstrated prespill puree to the vallecula. Mild oral and pharyngeal residue, able to clear with double swallow. No penetration or aspiration puree. Patient demonstrated prolonged mastication mechanical soft, spillage to the pyriforms. Moderate oral residue, required honey thick liquid wash to clear.  -OC --          SLP Evaluation Clinical Impression    SLP Swallowing Diagnosis moderate;oral dysphagia;pharyngeal dysphagia  -OC R/O pharyngeal dysphagia  -KA       Functional Impact risk of aspiration/pneumonia  -OC risk of aspiration/pneumonia  -KA       Rehab Potential/Prognosis, Swallowing good, to achieve stated therapy goals  -OC good, to achieve stated therapy goals  -KA       Swallow Criteria for Skilled Therapeutic Interventions Met demonstrates skilled criteria  -OC demonstrates skilled criteria  -KA          Recommendations    Therapy Frequency (Swallow) PRN  -OC PRN  -KA       Predicted Duration Therapy Intervention (Days) until discharge  -OC until discharge  -KA       SLP Diet Recommendation honey thick liquids;puree  -OC puree;nectar thick liquids  -KA       Recommended Diagnostics -- VFSS (MBS)  -KA       Recommended Precautions and Strategies upright posture during/after eating;small bites of food and sips of liquid;general aspiration precautions  -OC upright posture during/after eating;small bites of food and sips of liquid;general aspiration precautions  -KA       Oral Care Recommendations Oral Care BID/PRN  -OC Oral Care BID/PRN  -KA       SLP Rec. for Method of Medication Administration meds whole;meds crushed;with puree  -OC meds whole;meds crushed;with puree  -KA       Monitor for Signs of Aspiration yes;notify SLP if any  concerns  -OC yes;notify SLP if any concerns  -KA       Anticipated Discharge Disposition (SLP) unknown  -OC unknown  -KA          Swallow Goals (SLP)    Swallow STGs -- diet tolerance goal selection (SLP)  -LINDA       Diet Tolerance Goal Selection (SLP) -- Swallow Short Term Goal 1  -KA          (STG) Swallow 1    (STG) Swallow 1 -- Patient will tolerate least restrictive diet without overt s/s of pen/asp  -KA       [place] (Swallow Short Term Goal 1) -- independently (over 90% accuracy)  -LINDA                 User Key  (r) = Recorded By, (t) = Taken By, (c) = Cosigned By      Initials Name Effective Dates    Gill Lazo SLP 08/28/23 -     Joe Mcnamara SLP 01/05/24 -                     EDUCATION  The patient has been educated in the following areas:   Dysphagia (Swallowing Impairment).        SLP GOALS       Row Name 04/10/24 1500             (STG) Swallow 1    (STG) Swallow 1 Patient will tolerate least restrictive diet without overt s/s of pen/asp  -KA      [place] (Swallow Short Term Goal 1) independently (over 90% accuracy)  -LINDA                User Key  (r) = Recorded By, (t) = Taken By, (c) = Cosigned By      Initials Name Provider Type    Joe Mcnamara SLP Speech and Language Pathologist                       Time Calculation:    Time Calculation- SLP       Row Name 04/11/24 1102             Time Calculation- SLP    SLP Start Time 1102  -OC      SLP Received On 04/11/24  -OC         Untimed Charges    SLP Eval/Re-eval  ST Motion Fluoro Eval Swallow - 45614  -OC      [id number]-[id number] Motion Fluoro Eval Swallow Minutes 90  -OC         Total Minutes    Untimed Charges Total Minutes 90  -OC       Total Minutes 90  -OC                User Key  (r) = Recorded By, (t) = Taken By, (c) = Cosigned By      Initials Name Provider Type    Gill Lazo SLP Speech and Language Pathologist                    Therapy Charges for Today       Code Description Service Date Service Provider Modifiers Qty     [id number]  ST MOTION FLUORO EVAL SWALLOW 6 4/11/2024 Gill Sharma SLP GN 1                 COSMO Post  4/11/2024

## 2024-04-11 NOTE — THERAPY EVALUATION
Patient Name: Stephane Jaimes  : 1970    MRN: [id number]                              Today's Date: 2024       Admit Date: 2024    Visit Dx:     ICD-10-CM ICD-9-CM   1. Alcoholic intoxication without complication  F10.920 305.00   2. Transaminitis  R74.01 790.4   3. High anion gap metabolic acidosis  E87.29 276.2     Patient Active Problem List   Diagnosis    Alcohol intoxication    Transaminitis    HTN (hypertension)    Cerebellar stroke    Cerebral edema     History reviewed. No pertinent past medical history.  History reviewed. No pertinent surgical history.   General Information       Row Name 24 1127          OT Time and Intention    Document Type evaluation  -     Mode of Treatment co-treatment;physical therapy;occupational therapy  2/2 acuity of stroke, safe mobilization of pt  -       Row Name 24 112          General Information    Patient Profile Reviewed yes  -SM     Prior Level of Function independent:;work;community mobility;ADL's  puts in concrete, manual labor  -     Existing Precautions/Restrictions fall  -     Barriers to Rehab language barrier;medically complex  Portuguese speaking-  ipad used (409333)  -       Row Name 24 1127          Occupational Profile    Environmental Supports and Barriers (Occupational Profile) no home DME/AD  -       Row Name 24 112          Living Environment    People in Home significant other  -       Row Name 24 112          Home Main Entrance    Number of Stairs, Main Entrance two  -       Row Name 24 1127          Stairs Within Home, Primary    Stairs, Within Home, Primary apartment  -       Row Name 24 112          Cognition    Orientation Status (Cognition) oriented x 3;disoriented to;situation  -       Row Name 24 1127          Safety Issues, Functional Mobility    Safety Issues Affecting Function (Mobility) insight into deficits/self-awareness;safety precaution  awareness;impulsivity;judgment  -     Impairments Affecting Function (Mobility) postural/trunk control;endurance/activity tolerance;balance;cognition;strength;coordination;visual/perceptual  -     Comment, Safety Issues/Impairments (Mobility) PT/OT cotreatment medically appropriate and necessary due to patient acuity level, to maximize therapeutic benefit due to impaired act tolerance, and for safety of patient and staff. Treatment focused on progression of care and goals established in POC.  -               User Key  (r) = Recorded By, (t) = Taken By, (c) = Cosigned By      Initials Name Provider Type     Jenn Lewis, JUNE Occupational Therapist                     Mobility/ADL's       Row Name 04/11/24 1129          Bed Mobility    Supine-Sit [place] (Bed Mobility) moderate assist (50% patient effort);2 person assist;verbal cues  -     Sit-Supine [place] (Bed Mobility) moderate assist (50% patient effort);2 person assist;verbal cues  -     Assistive Device (Bed Mobility) bed rails;head of bed elevated;draw sheet  -       Row Name 04/11/24 1129          Sit-Stand Transfer    Sit-Stand [place] (Transfers) moderate assist (50% patient effort);2 person assist;verbal cues  -     Assistive Device (Sit-Stand Transfers) walker, front-wheeled  -       Row Name 04/11/24 1129          Functional Mobility    Functional Mobility- Ind. Level moderate assist (50% patient effort);2 person assist required  -     Functional Mobility- Comment HHAX2, side steps, very ataxic and fluccuating balance/postural control  -       Row Name 04/11/24 1129          Activities of Daily Living    BADL Assessment/Intervention lower body dressing;toileting;feeding  -       Row Name 04/11/24 1129          Lower Body Dressing Assessment/Training    [place] Level (Lower Body Dressing) don;socks;dependent (less than 25% patient effort)  -     Position (Lower Body Dressing) edge of bed sitting  -      Comment, (Lower Body Dressing) unsafe due to sitting balance concerns  -[facility] Name 04/11/24 1129          Toileting Assessment/Training    Comment, (Toileting) RN reports pt using urinal with set up, anticiapte X2 person assist requied for toileting on [facility] due to severity of standing balance/ataxia.  -SM       Row Name 04/11/24 1129          Self-Feeding Assessment/Training    [place] Level (Feeding) feeding skills;set up  SBA  -     Position (Self-Feeding) sitting up in bed  -     Comment, (Feeding) coordination deficits BUE, min spillage with a good set up  -               User Key  (r) = Recorded By, (t) = Taken By, (c) = Cosigned By      Initials Name Provider Type    Jenn Anderson OT Occupational Therapist                   Obj/Interventions       Row Name 04/11/24 1132          Sensory Assessment (Somatosensory)    Sensory Assessment (Somatosensory) UE sensation intact  -SM       Row Name 04/11/24 1132          Vision Assessment/Intervention    Vision Assessment Comment pt c/o of blurry vision  -SM       Row Name 04/11/24 1132          Range of Motion Comprehensive    General Range of Motion bilateral upper extremity ROM WFL  -SM       Row Name 04/11/24 1132          Strength Comprehensive (MMT)    Comment, General Manual Muscle Testing (MMT) Assessment strength fairly equal BUE MMT but language barrier can be limiting with following command for task. Pt noted to have mild L sided weakness by neuro notes.  -SM       Row Name 04/11/24 1132          Motor Skills    Motor Skills coordination  -     Coordination minimal impairment;moderate impairment;bilateral;upper extremity;ataxia  -SM       Row Name 04/11/24 1132          Balance    Static Sitting Balance minimal assist;moderate assist  -     Dynamic Sitting Balance moderate assist  -     Position, Sitting Balance sitting edge of bed  -     Static Standing Balance moderate assist;2-person assist  -     Dynamic Standing  Balance moderate assist;2-person assist  -SM     Position/Device Used, Standing Balance supported  -SM               User Key  (r) = Recorded By, (t) = Taken By, (c) = Cosigned By      Initials Name Provider Type    Jenn Anderson OT Occupational Therapist                   Goals/Plan       Row Name 04/11/24 1225          Transfer Goal 1 (OT)    Activity/Assistive Device (Transfer Goal 1, OT) commode, bedside without drop arms;sit-to-stand/stand-to-sit;bed-to-chair/chair-to-bed  -SM     [place] Level/Cues Needed (Transfer Goal 1, OT) minimum assist (75% or more patient effort)  -SM     Time Frame (Transfer Goal 1, OT) short term goal (STG);2 weeks  -SM     Progress/Outcome (Transfer Goal 1, OT) goal ongoing  -       Row Name 04/11/24 1225          Bathing Goal 1 (OT)    Activity/Device (Bathing Goal 1, OT) bathing skills, all  -SM     [place] Level/Cues Needed (Bathing Goal 1, OT) minimum assist (75% or more patient effort)  -SM     Time Frame (Bathing Goal 1, OT) short term goal (STG);2 weeks  -SM     Progress/Outcomes (Bathing Goal 1, OT) goal ongoing  -       Row Name 04/11/24 1225          Dressing Goal 1 (OT)    Activity/Device (Dressing Goal 1, OT) upper body dressing;lower body dressing  -SM     [place]/Cues Needed (Dressing Goal 1, OT) minimum assist (75% or more patient effort);moderate assist (50-74% patient effort)  -SM     Time Frame (Dressing Goal 1, OT) short term goal (STG);2 weeks  -SM     Progress/Outcome (Dressing Goal 1, OT) goal ongoing  -       Row Name 04/11/24 1225          Toileting Goal 1 (OT)    Activity/Device (Toileting Goal 1, OT) toileting skills, all  -SM     [place] Level/Cues Needed (Toileting Goal 1, OT) moderate assist (50-74% patient effort)  -SM     Time Frame (Toileting Goal 1, OT) short term goal (STG);2 weeks  -SM     Progress/Outcome (Toileting Goal 1, OT) goal ongoing  -       Row Name 04/11/24 1225          Grooming Goal 1 (OT)     Activity/Device (Grooming Goal 1, OT) grooming skills, all  -SM     [place] (Grooming Goal 1, OT) set-up required  -SM     Time Frame (Grooming Goal 1, OT) short term goal (STG);2 weeks  -SM     Progress/Outcome (Grooming Goal 1, OT) goal ongoing  -SM       Row Name 04/11/24 1225          Self-Feeding Goal 1 (OT)    Activity/Device (Self-Feeding Goal 1, OT) self-feeding skills, all  -SM     [place] Level/Cues Needed (Self-Feeding Goal 1, OT) modified independence  -SM     Time Frame (Self-Feeding Goal 1, OT) short term goal (STG);2 weeks  -SM     Progress/Outcomes (Self-Feeding Goal 1, OT) goal ongoing  -SM       Row Name 04/11/24 1225          Therapy Assessment/Plan (OT)    Planned Therapy Interventions (OT) activity tolerance training;adaptive equipment training;functional balance retraining;occupation/activity based interventions;neuromuscular control/coordination retraining;patient/caregiver education/training;transfer/mobility retraining;strengthening exercise;ROM/therapeutic exercise;BADL retraining  -               User Key  (r) = Recorded By, (t) = Taken By, (c) = Cosigned By      Initials Name Provider Type    SM Jenn Lewis, OT Occupational Therapist                   Clinical Impression       Row Name 04/11/24 1155          Pain Assessment    Pretreatment Pain Rating 0/10 - no pain  -SM     Posttreatment Pain Rating 0/10 - no pain  -SM       Row Name 04/11/24 1150          Plan of Care Review    Plan of Care Reviewed With patient  -SM     Outcome Evaluation Pt is a 53 y.o male admitted to [facility] after being found unresponsive in his car on 4/6- found to have alcohol intoxication, on 4/9 transfered to ICU with neuro changes and found to have cerebellar stroke, cerebral edema. Pt participated in OT today. He has difficulty maintaining sitting and standing balance, pt with quick postural control changes varies from min/mod A in both sitting and standing. Pt with ataxia BUE with finger to  nose. Difficulty with eating breakfast but is able to complete with some spillage. Pt requires X2 person assist for all LB ADLs/toileting. Recommend continued OT to address multiple deficits from acute stroke as well as generalized weakness from other medical issues related to this hospital stay. Anticipate need of rehab at [place].  -       Row Name 04/11/24 1155          Therapy Assessment/Plan (OT)    Rehab Potential (OT) good, to achieve stated therapy goals  -     Criteria for Skilled Therapeutic Interventions Met (OT) yes;skilled treatment is necessary  -     Therapy Frequency (OT) 5 times/wk  -       Row Name 04/11/24 1155          Therapy Plan Review/Discharge Plan (OT)    Anticipated Discharge Disposition (OT) inpatient rehabilitation facility  -       Row Name 04/11/24 1155          Positioning and Restraints    Pre-Treatment Position in bed  -     Post Treatment Position bed  -SM     In Bed fowlers;call light within reach;encouraged to call for assist;exit alarm on;notified ns  -               User Key  (r) = Recorded By, (t) = Taken By, (c) = Cosigned By      Initials Name Provider Type    Jenn Anderson, OT Occupational Therapist                   Outcome Measures       Row Name 04/11/24 1226          How much help from another is currently needed...    Putting on and taking off regular lower body clothing? 1  -SM     Bathing (including washing, rinsing, and drying) 2  -SM     Toileting (which includes using toilet bed pan or urinal) 2  -SM     Putting on and taking off regular upper body clothing 2  -SM     Taking care of personal grooming (such as brushing teeth) 3  -SM     Eating meals 3  -SM     AM-PAC 6 Clicks Score (OT) 13  -SM       Row Name 04/11/24 1031 04/11/24 0700       How much help from another person do you currently need...    Turning from your back to your side while in flat bed without using bedrails? 3  -DJ 4  -LUIS    Moving from lying on back to sitting on the side of  a flat bed without bedrails? 2  -DJ 4  -LUIS    Moving to and from a bed to a chair (including a wheelchair)? 2  -DJ 3  -LUIS    Standing up from a chair using your arms (e.g., wheelchair, bedside chair)? 3  -DJ 3  -LUIS    Climbing 3-5 steps with a railing? 2  -DJ 2  -LUIS    To walk in hospital room? 2  -DJ 2  -LUIS    AM-PAC 6 Clicks Score (PT) 14  -DJ 18  -LUIS    Highest Level of Mobility Goal 4 --> Transfer to chair/commode  -DJ 6 --> Walk 10 steps or more  -LUIS      Row Name 04/11/24 1226 04/11/24 1116       Modified [place]hon Scale    Modified [place] Scale 4 - Moderately severe disability.  Unable to walk without assistance, and unable to attend to own bodily needs without assistance.  -SM 4 - Moderately severe disability.  Unable to walk without assistance, and unable to attend to own bodily needs without assistance.  -DJ      Row Name 04/11/24 1226 04/11/24 1116       Functional Assessment    Outcome Measure Options AM-PAC 6 Clicks Daily Activity (OT);Modified [place]  -SM AM-PAC 6 Clicks Basic Mobility (PT)  -DJ      Row Name 04/11/24 1031          Functional Assessment    Outcome Measure Options AM-PAC 6 Clicks Basic Mobility (PT)  -               User Key  (r) = Recorded By, (t) = Taken By, (c) = Cosigned By      Initials Name Provider Type    Amira Godoy, PT Physical Therapist    Jenn Anderson, OT Occupational Therapist    Adina Wilkes, RN Registered Nurse                    Occupational Therapy Education       Title: PT OT SLP Therapies (In Progress)       Topic: Occupational Therapy (In Progress)       Point: ADL training (Done)       Description:   Instruct learner(s) on proper safety adaptation and remediation techniques during self care or transfers.   Instruct in proper use of assistive devices.                  Learning Progress Summary             Patient Acceptance, E, VU by  at 4/11/2024 1227    Comment: OT goals, POC, dc recommendations                         Point: Home exercise  program (Not Started)       Description:   Instruct learner(s) on appropriate technique for monitoring, assisting and/or progressing therapeutic exercises/activities.                  Learner Progress:  Not documented in this visit.              Point: Precautions (Not Started)       Description:   Instruct learner(s) on prescribed precautions during self-care and functional transfers.                  Learner Progress:  Not documented in this visit.              Point: Body mechanics (Not Started)       Description:   Instruct learner(s) on proper positioning and spine alignment during self-care, functional mobility activities and/or exercises.                  Learner Progress:  Not documented in this visit.                              User Key       Initials Effective Dates Name Provider Type Discipline     04/02/20 -  Jenn Lewis, OT Occupational Therapist OT                  OT Recommendation and Plan  Planned Therapy Interventions (OT): activity tolerance training, adaptive equipment training, functional balance retraining, occupation/activity based interventions, neuromuscular control/coordination retraining, patient/caregiver education/training, transfer/mobility retraining, strengthening exercise, ROM/therapeutic exercise, BADL retraining  Therapy Frequency (OT): 5 times/wk  Plan of Care Review  Plan of Care Reviewed With: patient  Outcome Evaluation: Pt is a 53 y.o male admitted to [facility] after being found unresponsive in his car on 4/6- found to have alcohol intoxication, on 4/9 transfered to ICU with neuro changes and found to have cerebellar stroke, cerebral edema. Pt participated in OT today. He has difficulty maintaining sitting and standing balance, pt with quick postural control changes varies from min/mod A in both sitting and standing. Pt with ataxia BUE with finger to nose. Difficulty with eating breakfast but is able to complete with some spillage. Pt requires X2 person assist for all LB  ADLs/toileting. Recommend continued OT to address multiple deficits from acute stroke as well as generalized weakness from other medical issues related to this hospital stay. Anticipate need of rehab at [place].     Time Calculation:   Evaluation Complexity (OT)  Review Occupational Profile/Medical/Therapy History Complexity: extensive/high complexity  Assessment, Occupational Performance/Identification of Deficit Complexity: 5 or more performance deficits  Clinical Decision Making Complexity (OT): comprehensive assessment/high complexity  Overall Complexity of Evaluation (OT): high complexity     Time Calculation- OT       Row Name 04/11/24 1227             Time Calculation- OT    OT Start Time 0821  -SM      OT Stop Time 0841  -SM      OT Time Calculation (min) 20 min  -SM      Total Timed Code Minutes- OT 12 minute(s)  -SM      OT Received On 04/11/24  -      OT - Next Appointment 04/12/24  -SM      OT Goal Re-Cert Due Date 04/25/24  -         Timed Charges    58452 - OT Self Care/Mgmt Minutes 12  -SM         Untimed Charges    OT Eval/Re-eval Minutes 8  -SM         Total Minutes    Timed Charges Total Minutes 12  -SM      Untimed Charges Total Minutes 8  -SM       Total Minutes 20  -SM                User Key  (r) = Recorded By, (t) = Taken By, (c) = Cosigned By      Initials Name Provider Type     Jenn Lewis OT Occupational Therapist                  Therapy Charges for Today       Code Description Service Date Service Provider Modifiers Qty    27022984177 HC OT SELF CARE/MGMT/TRAIN EA 15 MIN 4/11/2024 Jenn Lewis OT GO 1    28547234137 HC OT EVAL HIGH COMPLEXITY 3 4/11/2024 Jenn Lewis OT GO 1                 Jenn Lewis OT  4/11/2024

## 2024-04-11 NOTE — PLAN OF CARE
Goal Outcome Evaluation:  Plan of Care Reviewed With: patient           Outcome Evaluation: Pt is a 53 y.o male admitted to [facility] after being found unresponsive in his car on 4/6- found to have alcohol intoxication, on 4/9 transfered to ICU with neuro changes and found to have cerebellar stroke, cerebral edema. Pt participated in OT today. He has difficulty maintaining sitting and standing balance, pt with quick postural control changes varies from min/mod A in both sitting and standing. Pt with ataxia BUE with finger to nose. Difficulty with eating breakfast but is able to complete with some spillage. Pt requires X2 person assist for all LB ADLs/toileting. Recommend continued OT to address multiple deficits from acute stroke as well as generalized weakness from other medical issues related to this hospital stay. Anticipate need of rehab at [place].      Anticipated Discharge Disposition (OT): inpatient rehabilitation facility

## 2024-04-11 NOTE — PROGRESS NOTES
"     LOS: 1 day   Patient Care Team:  System, Provider Not In as PCP - General    Chief Complaint:  F/U cerebral edema secondary to stroke    Subjective       Interval History: Transport here to take patient to Xray for swallow study. Continues to have headache at top of head.   History taken from: patient chart RN    Objective        Vital Signs  Temp:  [97.5 °F (36.4 °C)-98.4 °F (36.9 °C)] 98 °F (36.7 °C)  Heart Rate:  [] 105  Resp:  [12] 12  BP: (114-169)/() 151/84    Physical Exam:     AA&O x 3. Stated name, date of birth, and \"hospital\" as place.  PERRL. More tolerant to bright light today.   EOM's intact. Looking to left more today.   Face symmetric. Tongue midline.   Follows commands x 4 extremities.   No drift upper extremities. No dysmetria.        Results Review:     I reviewed the patient's new clinical results.  I reviewed the patient's new imaging results and agree with the interpretation.  Discussed with Dr. Garsia    CT HEAD WO CONTRAST 4/11/2024    Extensive L cerebellar infarct and continued cerebellar edema, sulcal effacement, compression of the fourth ventricle and left 3 mm tonsillar descent is unchanged. Third and bilateral lateral ventricle dilatation again noted. No acute interval change.     MRI ANGIOGRAM HEAD WO CONTRAST-, MRI ANGIOGRAM NECK W WO CONTRAST-, MRI BRAIN W WO CONTRAST-    Large subacute left cerebellar hemisphere and cerebellar vermis and mass effect on dick and basilar cistern and mild hydrocephalus noted.     No flow left superior cerebellar artery, and similar decreased flow right proximal and mid right vertebral artery, minimal flow proximal right V4 segment and no flow distal right V4 segment is appreciated. Small pontine infarcts also appreciated.        Assessment & Plan       Alcohol intoxication    Transaminitis    HTN (hypertension)    Cerebellar stroke    Cerebral edema    Discussed above imaging results and stable neurologic exam findings with Dr. Garsia. "     Stroke neurology following for stroke and cerebral edema management with Hypertonic saline.   Neurosurgery will continue to follow.       Anyi Rice, GABRIELA  04/11/24  09:36 EDT

## 2024-04-11 NOTE — PROGRESS NOTES
"DOS: 2024  NAME: Stephane Jaimes   : 1970  PCP: System, Provider Not In  Chief Complaint   Patient presents with    Alcohol Intoxication       Chief complaint: Right gaze preference    Subjective: Patient has been seen and evaluated, no acute events overnight.  Repeat CT head this morning stable.  Still at 143 on 3%.  Today is day 3    Objective:  Vital signs: /84   Pulse 105   Temp 98 °F (36.7 °C) (Oral)   Resp 12   Ht 170.2 cm (67\")   Wt 85.8 kg (189 lb 2.5 oz)   SpO2 96%   BMI 29.63 kg/m²    Gen: NAD, vitals reviewed  MS: Alert awake oriented x 2 normal comprehension repetition and fluent  CN: visual acuity grossly normal right gaze preference no facial droop, no dysarthria, no clear double vision  Motor: 5/5 throughout upper and lower extremities, normal tone  Ataxia    Laboratory results:  Lab Results   Component Value Date    GLUCOSE 123 (H) 2024    CALCIUM 8.3 (L) 2024     2024    K 3.9 2024    CO2 20.0 (L) 2024     (H) 2024    BUN 10 2024    CREATININE 0.77 2024    BCR 13.0 2024    ANIONGAP 11.0 2024     Lab Results   Component Value Date    WBC 9.78 2024    HGB 14.5 2024    HCT 42.5 2024    MCV 98.4 (H) 2024     2024     Lab Results   Component Value Date     (H) 2024         Lab 24  0521   HEMOGLOBIN A1C 6.00*        Review of labs:   Sodium 143 this morning  Creatinine 0.77  Blood glucose 112    A1c 6.0      WBC 9.78  Hemoglobin 14.5 and platelets 277      Urinalysis negative  Urine tox positive only for ethanol 170     Imaging review:      CT T scan on admission for 24 showed no acute hypodensity or hypodensity     CTA Head and Neck - Rt veretebral hypoplastic and filling defect on the left vertebral with evidence of possible thrombus, good flow throw Left vertebral      CTP- No core or penumbra     TTE 70% EF normal LAE no clot no " thrombus    Repeat CT head 4/11/2024 stable    MRI brain showed shows left cerebellar infarct along with left dick stroke, mild T2 flair changes no SWI change    MRA head and neck no significant vessel stenosis workup for but right vertebral nondominant with minimal flow in the V4     Diagnoses:  Acute Left cerebellar ischemic stroke with brain compression   Acute metabolic encephalopathy  Alcohol abuse  Hypertension on amlodipine  Concern for aspiration pneumonia on ceftriaxone     1.Acute Left cerebellar ischemic stroke with brain compression, left pontine stroke  Out of the window for TNK   Completed infarct on CT Head so no intervention   Etiology of the stroke most likely ESUS  -Continue Aspirin 325 mg daily and Lipitor 80 mg daily   - On 3% Hypertonic saline, after initial bolus of 2ml/kg, rate at 40cc/hr and last   - MRI and MRA neck w/wo left dick and left cerebellar infarct and normal flow through the left vertebral artery  - Possible non occlusive thrombus on the left vertebral and cannot AC right now due to the high risk of bleed from the huge left cerebellar stroke  -Hypercoag panel ordered  - KOSTA on board for Crani watch   - Repeat CT Head this AM stable, will repeat tomorrow a.m.   -Patient is not a candidate for anticoagulation right now so might need outpatient KYLE with cardiology and also Zio patch on discharge     2. Alcohol Withdrawal   - On CIWA protocol   -Continue IV vitamins and IV thiamine replacement  -Provided substance abuse counseling at bedside      MDM   Reviewed: Previous charts, nursing notes and vitals   Reviewed: Previous labs and CT scan    Interpretation: Labs and CT scan   Total time providing critical care is :30-74 minutes. This excluded time spent performing separately reportable procedures and services  Consults :Neurology/Stroke    Please note that portions of this note were completed with a voice recognition program.     Cheng Camacho MD  Neuro  Hospitalist /Vascular Neurology.

## 2024-04-11 NOTE — PLAN OF CARE
Goal Outcome Evaluation:  Plan of Care Reviewed With: patient           Outcome Evaluation: 54yo  male admitted 24 after being found unresponsive in car, intoxicated, hbp. Pt also had L cerebellar stroke. PLOF: Pt lives in apartment with 2 ALFREDO with significant other - he denies presence of  baby. He was independent with ADLs and IADLs including driving and working. He is limited now by generalized weakness and decreased activity tolerance as well as poor sitting and standing balance that limits his funcitonal mobility. Today, he was resting in bed in NAD,  used for communication, oriented to name and situation. He req mod A of 2 to sit EOB and dem unsteady, ataxic balance while sitting EOB. He stood from EOB with mod A Of 2 and again dem ataxic and unsteady balance. Pt stood EOB and took 3-4 small sidesteps with mod A of 2 using r wx and freq vc. He would benefit from skilled PT services to address functional defiicts and prepare for d/c. Recommend inpt rehab.      Anticipated Discharge Disposition (PT): inpatient rehabilitation facility

## 2024-04-11 NOTE — PROGRESS NOTES
[facility] INPATIENT PROGRESS NOTE         [facility] INTENSIVE CARE    2024      PATIENT IDENTIFICATION:  Name: Stephane Jaimes ADMIT: 2024   : 1970  PCP: System, Provider Not In    MRN: [id number] LOS: 1 days   AGE/SEX: 53 y.o. male  ROOM: [facility]                     LOS 1    Reason for visit: ICU medical management with subacute infarctions with mass effect hydrocephalus      SUBJECTIVE:      No new issues overnight.  On hypertonic saline.  Not requiring pressors.    Objective   OBJECTIVE:    Vital Sign Min/Max for last 24 hours  Temp  Min: 97.5 °F (36.4 °C)  Max: 98.4 °F (36.9 °C)   BP  Min: 114/82  Max: 169/90   Pulse  Min: 66  Max: 105   Resp  Min: 12  Max: 12   SpO2  Min: 90 %  Max: 99 %   No data recorded   Weight  Min: 85.8 kg (189 lb 2.5 oz)  Max: 85.8 kg (189 lb 2.5 oz)    Vitals:    24 0800 24 0814 24 0815 24 0830   BP: 151/84      Pulse: 66  80 105   Resp:       Temp:       TempSrc:       SpO2: 94% 94% 90% 96%   Weight:       Height:                24  0725 04/10/24  0420 24  0412   Weight: 91.4 kg (201 lb 8 oz) 87.6 kg (193 lb 2 oz) 85.8 kg (189 lb 2.5 oz)       Body mass index is 29.63 kg/m².                          Body mass index is 29.63 kg/m².    Intake/Output Summary (Last 24 hours) at 2024 1025  Last data filed at 2024 0600  Gross per 24 hour   Intake 1253 ml   Output 1700 ml   Net -447 ml         Exam:  GEN:  No distress, appears stated age  EYES:   PERRL, anicteric sclerae  ENT:    External ears/nose normal, OP clear  NECK:  No adenopathy, midline trachea  LUNGS: Normal chest on inspection, palpation and auscultation  CV:  Normal S1S2, without murmur  ABD:  Nontender, nondistended, no hepatosplenomegaly, +BS  EXT:  No edema.  No cyanosis or clubbing.  No mottling and normal cap refill.    Assessment     Scheduled meds:  amLODIPine, 10 mg, Oral, Q24H  aspirin, 325 mg, Oral, Daily   Or  aspirin, 300 mg, Rectal,  Daily  atorvastatin, 80 mg, Oral, Nightly  cefTRIAXone, 2,000 mg, Intravenous, Daily  folic acid, 1 mg, Oral, Daily  multivitamin with minerals, 1 tablet, Oral, Daily  thiamine (B-1) IV, 200 mg, Intravenous, Q8H   Followed by  [START ON 4/12/2024] thiamine, 100 mg, Oral, Daily      IV meds:                      sodium chloride, 50 mL/hr, Last Rate: 50 mL/hr (04/11/24 0150)      Data Review:  Results from last 7 days   Lab Units 04/11/24  0828 04/11/24  0521 04/10/24  2357 04/10/24  1935 04/10/24  1127 04/10/24  0828 04/10/24  0557   SODIUM mmol/L 143 140  140 139 137  138 137 136 134*  134*   POTASSIUM mmol/L 3.9 4.0  4.0  --  3.6 3.7 3.5 3.5   CHLORIDE mmol/L 112* 110*  --  106  --  106 103   CO2 mmol/L 20.0* 19.0*  --  22.0  --  19.9* 20.0*   BUN mg/dL 10 10  --  11  --  12 12   CREATININE mg/dL 0.77 0.74*  --  0.89  --  0.61* 0.69*   GLUCOSE mg/dL 123* 94  --  97  --  104* 112*   CALCIUM mg/dL 8.3* 8.2*  --  8.2*  --  8.4* 8.4*         Estimated Creatinine Clearance: 118.8 mL/min (by C-G formula based on SCr of 0.77 mg/dL).  Results from last 7 days   Lab Units 04/11/24  0521 04/10/24  0557 04/09/24  0302 04/08/24 0227 04/06/24 2133   WBC 10*3/mm3 9.78 11.50* 12.25* 12.07* 10.94*   HEMOGLOBIN g/dL 14.5 14.6 15.1 14.7 14.4   PLATELETS 10*3/mm3 277 279 266 263 311     Results from last 7 days   Lab Units 04/06/24  2133   INR  1.01     Results from last 7 days   Lab Units 04/09/24 0302 04/06/24 2133   ALT (SGPT) U/L 32 47*   AST (SGOT) U/L 28 42*         Results from last 7 days   Lab Units 04/08/24  0227 04/07/24  2016 04/07/24  1421 04/07/24  1114 04/07/24  0806 04/07/24  0510 04/06/24  2133   PROCALCITONIN ng/mL  --   --   --   --   --   --  0.06   LACTATE mmol/L 2.2* 3.2* 2.3* 3.0* 3.8* 3.9*  --          Hemoglobin A1C   Date/Time Value Ref Range Status   04/11/2024 0521 6.00 (H) 4.80 - 5.60 % Final     Glucose   Date/Time Value Ref Range Status   04/09/2024 1727 123 70 - 130 mg/dL Final         Imaging  reviewed  Repeat CT head 4/11 reviewed      2D echo 4/9 reviewed: EF greater than 70%.  Normal diastolic function.          Microbiology reviewed  RVP negative.  MRSA screen negative.          Active Hospital Problems    Diagnosis  POA    **Alcohol intoxication [F10.929]  Yes    Cerebellar stroke [I63.9]  Unknown    Cerebral edema [G93.6]  Unknown    HTN (hypertension) [I10]  Unknown    Transaminitis [R74.01]  Unknown      Resolved Hospital Problems   No resolved problems to display.         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  Control glucose.  Control blood pressure.  Monitoring for alcohol withdrawal.  Antibiotics for presumed aspiration pneumonia present on admission.  Mechanical DVT prophylaxis until pharmacologic cleared by neurology/neurosurgery service.    Discussed with multidisciplinary ICU team on rounds this morning.            Kirk Bee MD  Pulmonary and Critical Care Medicine  [facility], [facility]  4/11/2024    10:25 EDT

## 2024-04-11 NOTE — PLAN OF CARE
Goal Outcome Evaluation:      Pt alert and oriented x3 overnight, some episodes of confusion. 3% gtt continues at 50mL/hr per neurology. NIH 3. Awaiting AM CT results. VSS on RA. Free from falls and injury.

## 2024-04-11 NOTE — THERAPY EVALUATION
Patient Name: Stephane Jaimes  : 1970    MRN: [id number]                              Today's Date: 2024       Admit Date: 2024    Visit Dx:     ICD-10-CM ICD-9-CM   1. Alcoholic intoxication without complication  F10.920 305.00   2. Transaminitis  R74.01 790.4   3. High anion gap metabolic acidosis  E87.29 276.2     Patient Active Problem List   Diagnosis    Alcohol intoxication    Transaminitis    HTN (hypertension)    Cerebellar stroke    Cerebral edema     History reviewed. No pertinent past medical history.  History reviewed. No pertinent surgical history.   General Information       Row Name 24 1000          Physical Therapy Time and Intention    Document Type evaluation  -DJ     Mode of Treatment co-treatment;physical therapy;occupational therapy  cotreat appropriate due to unknown level of assist and baalnce deficits as well as anticipated limited activity tolerance  -DJ       Row Name 24 1000          General Information    Patient Profile Reviewed yes  -DJ     Prior Level of Function independent:;driving;work;community mobility  -DJ     Existing Precautions/Restrictions fall  -DJ     Barriers to Rehab language barrier;medically complex   used  -DJ       Row Name 24 1000          Living Environment    People in Home significant other;other (see comments)  pt denies  baby in home  -DJ       Row Name 24 1000          Home Main Entrance    Number of Stairs, Main Entrance two  -DJ       Row Name 24 1000          Cognition    Orientation Status (Cognition) oriented x 3  -DJ       Row Name 24 1000          Safety Issues, Functional Mobility    Safety Issues Affecting Function (Mobility) judgment;safety precaution awareness  -DJ     Impairments Affecting Function (Mobility) postural/trunk control;endurance/activity tolerance;balance;cognition;strength  -DJ     Cognitive Impairments, Mobility Safety/Performance judgment;safety precaution  awareness;insight into deficits/self-awareness  -DJ     Comment, Safety Issues/Impairments (Mobility) gt belt, nonskid socks  -DJ               User Key  (r) = Recorded By, (t) = Taken By, (c) = Cosigned By      Initials Name Provider Type    Amira Godoy, PT Physical Therapist                   Mobility       Row Name 04/11/24 1006          Bed Mobility    Bed Mobility supine-sit;sit-supine  -DJ     Supine-Sit [place] (Bed Mobility) moderate assist (50% patient effort);2 person assist;verbal cues  -DJ     Sit-Supine [place] (Bed Mobility) moderate assist (50% patient effort);2 person assist;verbal cues  -DJ     Assistive Device (Bed Mobility) bed rails;head of bed elevated;draw sheet  -DJ     Comment, (Bed Mobility) v cfor sequencing, rolled onto side with SBA  -DJ       Row Name 04/11/24 1006          Transfers    Comment, (Transfers) sit/stand from EOB  -DJ       Row Name 04/11/24 1006          Bed-Chair Transfer    Bed-Chair [place] (Transfers) not tested  -DJ       Row Name 04/11/24 1006          Sit-Stand Transfer    Sit-Stand [place] (Transfers) moderate assist (50% patient effort);2 person assist;verbal cues  -DJ     Assistive Device (Sit-Stand Transfers) walker, front-wheeled  -DJ     Comment, (Sit-Stand Transfer) ataxic  -DJ       Row Name 04/11/24 1006          Gait/Stairs (Locomotion)    [place] Level (Gait) moderate assist (50% patient effort);2 person assist;verbal cues  -DJ     Assistive Device (Gait) walker, front-wheeled  -DJ     Distance in Feet (Gait) --  MIP, side steps  -DJ     Deviations/Abnormal Patterns (Gait) festinating/shuffling;stride length decreased;ataxic  -DJ     Bilateral Gait Deviations forward flexed posture;heel strike decreased  -DJ     [place] Level (Stairs) not tested  -DJ     Comment, (Gait/Stairs) Pt stood EOB and took 3-4 small sidesteps with mod A of 2 using r wx and freq vc. Balance is very ataxic seated and standing.  -DJ                User Key  (r) = Recorded By, (t) = Taken By, (c) = Cosigned By      Initials Name Provider Type    Amira Godoy PT Physical Therapist                   Obj/Interventions       Row Name 04/11/24 1010          Range of Motion Comprehensive    Comment, General Range of Motion grossly WFL  -DJ       Row Name 04/11/24 1010          Strength Comprehensive (MMT)    Comment, General Manual Muscle Testing (MMT) Assessment good LE strength B  -DJ       Row Name 04/11/24 1010          Motor Skills    Motor Skills functional endurance  -DJ     Functional Endurance poor  -DJ       Row Name 04/11/24 1010          Balance    Balance Assessment standing static balance;sitting static balance;sitting dynamic balance;standing dynamic balance  -DJ     Static Sitting Balance minimal assist;moderate assist;verbal cues  -DJ     Dynamic Sitting Balance moderate assist;verbal cues  -DJ     Position, Sitting Balance supported;sitting edge of bed  -DJ     Static Standing Balance moderate assist;2-person assist;verbal cues  -DJ     Dynamic Standing Balance moderate assist;2-person assist;verbal cues  -DJ     Position/Device Used, Standing Balance walker, front-wheeled;supported  -DJ     Balance Interventions sitting;standing;sit to stand;supported;weight shifting activity  -DJ     Comment, Balance ataxic sitting and standing balance  -DJ       Row Name 04/11/24 1010          Sensory Assessment (Somatosensory)    Sensory Assessment (Somatosensory) sensation intact  -DJ               User Key  (r) = Recorded By, (t) = Taken By, (c) = Cosigned By      Initials Name Provider Type    Amira Godoy PT Physical Therapist                   Goals/Plan       Row Name 04/11/24 1020          Bed Mobility Goal 1 (PT)    Activity/Assistive Device (Bed Mobility Goal 1, PT) sit to supine;supine to sit  -DJ     [place] Level/Cues Needed (Bed Mobility Goal 1, PT) verbal cues required;contact guard required  -DJ     Time Frame (Bed Mobility Goal 1,  PT) 2 weeks  -DJ       Row Name 24 1020          Transfer Goal 1 (PT)    Activity/Assistive Device (Transfer Goal 1, PT) sit-to-stand/stand-to-sit  -DJ     [place] Level/Cues Needed (Transfer Goal 1, PT) verbal cues required;contact guard required  -DJ     Time Frame (Transfer Goal 1, PT) 2 weeks  -DJ     Strategies/Barriers (Transfers Goal 1, PT) least restrictive AD  -DJ       Row Name 24 1020          Gait Training Goal 1 (PT)    Activity/Assistive Device (Gait Training Goal 1, PT) gait (walking locomotion);assistive device use;improve balance and speed;increase endurance/gait distance;increase energy conservation  -DJ     [place] Level (Gait Training Goal 1, PT) minimum assist (75% or more patient effort);verbal cues required  -DJ     Distance (Gait Training Goal 1, PT) 50' with least restrictive AD  -DJ     Time Frame (Gait Training Goal 1, PT) 2 weeks  -DJ       Row Name 24 1020          Therapy Assessment/Plan (PT)    Planned Therapy Interventions (PT) balance training;bed mobility training;gait training;home exercise program;transfer training;strengthening;stair training;postural re-education;patient/family education  -DJ               User Key  (r) = Recorded By, (t) = Taken By, (c) = Cosigned By      Initials Name Provider Type    Amira Godoy, PT Physical Therapist                   Clinical Impression       Row Name 24 1012          Pain    Pretreatment Pain Rating 0/10 - no pain  -DJ       Row Name 24 1012          Plan of Care Review    Plan of Care Reviewed With patient  -DJ     Outcome Evaluation 52yo  male admitted 24 after being found unresponsive in car, intoxicated, hbp. Pt also had L cerebellar stroke. PLOF: Pt lives in apartment with 2 ALFREDO with significant other - he denies presence of  baby. He was independent with ADLs and IADLs including driving and working. He is limited now by generalized weakness and decreased activity  tolerance as well as poor sitting and standing balance that limits his funcitonal mobility. Today, he was resting in bed in NAD,  used for communication, oriented to name and situation. He req mod A of 2 to sit EOB and dem unsteady, ataxic balance while sitting EOB. He stood from EOB with mod A Of 2 and again dem ataxic and unsteady balance. Pt stood EOB and took 3-4 small sidesteps with mod A of 2 using r wx and freq vc. He would benefit from skilled PT services to address functional defiicts and prepare for d/c. Recommend inpt rehab.  -DJ       Row Name 04/11/24 1012          Therapy Assessment/Plan (PT)    Rehab Potential (PT) fair, will monitor progress closely  -DJ     Criteria for Skilled Interventions Met (PT) yes;meets criteria;skilled treatment is necessary  -DJ     Therapy Frequency (PT) 6 times/wk  -DJ       Row Name 04/11/24 1012          Vital Signs    Pre Patient Position Supine  -DJ     Intra Patient Position Standing  -DJ     Post Patient Position Supine  -DJ       Row Name 04/11/24 1012          Positioning and Restraints    Pre-Treatment Position in bed  -DJ     Post Treatment Position bed  -DJ     In Bed notified nsg;supine;call light within reach;encouraged to call for assist;exit alarm on;SCD pump applied;heels elevated  -DJ               User Key  (r) = Recorded By, (t) = Taken By, (c) = Cosigned By      Initials Name Provider Type    Amira Godoy, PT Physical Therapist                   Outcome Measures       Row Name 04/11/24 1031 04/11/24 0700       How much help from another person do you currently need...    Turning from your back to your side while in flat bed without using bedrails? 3  -DJ 4  -LUIS    Moving from lying on back to sitting on the side of a flat bed without bedrails? 2  -DJ 4  -LUIS    Moving to and from a bed to a chair (including a wheelchair)? 2  -DJ 3  -LUIS    Standing up from a chair using your arms (e.g., wheelchair, bedside chair)? 3  -DJ 3  -LUIS    Climbing  3-5 steps with a railing? 2  -DJ 2  -LUIS    To walk in hospital room? 2  -DJ 2  -LUIS    AM-PAC 6 Clicks Score (PT) 14  -DJ 18  -LUIS    Highest Level of Mobility Goal 4 --> Transfer to chair/commode  -DJ 6 --> Walk 10 steps or more  -LUIS      Row Name 04/11/24 1031          Functional Assessment    Outcome Measure Options AM-PAC 6 Clicks Basic Mobility (PT)  -               User Key  (r) = Recorded By, (t) = Taken By, (c) = Cosigned By      Initials Name Provider Type    Amira Godoy, JANICE Physical Therapist    Adina Wilkes, RN Registered Nurse                                 Physical Therapy Education       Title: PT OT SLP Therapies (In Progress)       Topic: Physical Therapy (In Progress)       Point: Mobility training (In Progress)       Learning Progress Summary             Patient Acceptance, E,I, NR by MOI at 4/11/2024 1033                         Point: Home exercise program (Not Started)       Learner Progress:  Not documented in this visit.              Point: Body mechanics (In Progress)       Learning Progress Summary             Patient Acceptance, E,I, NR by MOI at 4/11/2024 1033                         Point: Precautions (In Progress)       Learning Progress Summary             Patient Acceptance, E,I, NR by  at 4/11/2024 1033                                         User Key       Initials Effective Dates Name Provider Type Discipline     10/25/19 -  Amira Gonzalez, PT Physical Therapist PT                  PT Recommendation and Plan  Planned Therapy Interventions (PT): balance training, bed mobility training, gait training, home exercise program, transfer training, strengthening, stair training, postural re-education, patient/family education  Plan of Care Reviewed With: patient  Outcome Evaluation: 52yo  male admitted 4/6/24 after being found unresponsive in car, intoxicated, hbp. Pt also had L cerebellar stroke. PLOF: Pt lives in apartment with 2 ALFREDO with significant other - he denies  presence of  baby. He was independent with ADLs and IADLs including driving and working. He is limited now by generalized weakness and decreased activity tolerance as well as poor sitting and standing balance that limits his funcitonal mobility. Today, he was resting in bed in NAD,  used for communication, oriented to name and situation. He req mod A of 2 to sit EOB and dem unsteady, ataxic balance while sitting EOB. He stood from EOB with mod A Of 2 and again dem ataxic and unsteady balance. Pt stood EOB and took 3-4 small sidesteps with mod A of 2 using r wx and freq vc. He would benefit from skilled PT services to address functional defiicts and prepare for d/c. Recommend inpt rehab.     Time Calculation:   PT Evaluation Complexity  History, PT Evaluation Complexity: 3 or more personal factors and/or comorbidities  Examination of Body Systems (PT Eval Complexity): total of 4 or more elements  Clinical Presentation (PT Evaluation Complexity): evolving  Clinical Decision Making (PT Evaluation Complexity): moderate complexity  Overall Complexity (PT Evaluation Complexity): moderate complexity     PT Charges       Row Name 24 1115             Time Calculation    Start Time 0819  -DJ      Stop Time 0843  -DJ      Time Calculation (min) 24 min  -DJ      PT Non-Billable Time (min) 10 min  -DJ      PT Received On 24  -DJ      PT - Next Appointment 24  -DJ      PT Goal Re-Cert Due Date 24  -DJ                User Key  (r) = Recorded By, (t) = Taken By, (c) = Cosigned By      Initials Name Provider Type    Amira Godoy, PT Physical Therapist                  Therapy Charges for Today       Code Description Service Date Service Provider Modifiers Qty    39561842788 HC PT EVAL MOD COMPLEXITY 3 2024 Amira Gonzalez, PT GP 1    26137037186 HC PT THERAPEUTIC ACT EA 15 MIN 2024 Amira Gonzalez, PT GP 2            PT G-Codes  Outcome Measure Options: AM-PAC 6 Clicks Basic Mobility  (PT)  AM-PAC 6 Clicks Score (PT): 14  PT Discharge Summary  Anticipated Discharge Disposition (PT): inpatient rehabilitation facility    Amira Gonzalez, JANICE  4/11/2024

## 2024-04-11 NOTE — PLAN OF CARE
Goal Outcome Evaluation:  Plan of Care Reviewed With: patient           Outcome Evaluation: VFSS completed. Recommend puree and honey thick liquids, meds whole/crushed with puree. Recommend upright, slow rate, altenrate liquids/solids. Suspect patient mental status impacting swallow function. Patient alert and upright for study, impulsive with intake of liquids.

## 2024-04-12 ENCOUNTER — APPOINTMENT (OUTPATIENT)
Dept: CT IMAGING | Facility: HOSPITAL | Age: 54
End: 2024-04-12

## 2024-04-12 LAB
ANION GAP SERPL CALCULATED.3IONS-SCNC: 10.3 MMOL/L (ref 5–15)
ANION GAP SERPL CALCULATED.3IONS-SCNC: 10.8 MMOL/L (ref 5–15)
ANION GAP SERPL CALCULATED.3IONS-SCNC: 11 MMOL/L (ref 5–15)
APTT SCREEN TO CONFIRM RATIO: 1.19 RATIO (ref 0–1.34)
AT III PPP CHRO-ACNC: 128 % (ref 90–134)
B2 GLYCOPROT1 IGA SER-ACNC: 46 GPI IGA UNITS (ref 0–25)
B2 GLYCOPROT1 IGG SER-ACNC: <9 GPI IGG UNITS (ref 0–20)
B2 GLYCOPROT1 IGM SER-ACNC: <9 GPI IGM UNITS (ref 0–32)
BUN SERPL-MCNC: 12 MG/DL (ref 6–20)
BUN SERPL-MCNC: 16 MG/DL (ref 6–20)
BUN SERPL-MCNC: 16 MG/DL (ref 6–20)
BUN/CREAT SERPL: 16.2 (ref 7–25)
BUN/CREAT SERPL: 19.5 (ref 7–25)
BUN/CREAT SERPL: 20.3 (ref 7–25)
CALCIUM SPEC-SCNC: 8.7 MG/DL (ref 8.6–10.5)
CALCIUM SPEC-SCNC: 9.1 MG/DL (ref 8.6–10.5)
CALCIUM SPEC-SCNC: 9.3 MG/DL (ref 8.6–10.5)
CHLORIDE SERPL-SCNC: 108 MMOL/L (ref 98–107)
CHLORIDE SERPL-SCNC: 109 MMOL/L (ref 98–107)
CHLORIDE SERPL-SCNC: 111 MMOL/L (ref 98–107)
CO2 SERPL-SCNC: 17.7 MMOL/L (ref 22–29)
CO2 SERPL-SCNC: 21 MMOL/L (ref 22–29)
CO2 SERPL-SCNC: 21.2 MMOL/L (ref 22–29)
CONFIRM APTT/NORMAL: 37 SEC (ref 0–47.6)
CREAT SERPL-MCNC: 0.74 MG/DL (ref 0.76–1.27)
CREAT SERPL-MCNC: 0.79 MG/DL (ref 0.76–1.27)
CREAT SERPL-MCNC: 0.82 MG/DL (ref 0.76–1.27)
DEPRECATED RDW RBC AUTO: 43.8 FL (ref 37–54)
EGFRCR SERPLBLD CKD-EPI 2021: 106.4 ML/MIN/1.73
EGFRCR SERPLBLD CKD-EPI 2021: 107.6 ML/MIN/1.73
EGFRCR SERPLBLD CKD-EPI 2021: 109.7 ML/MIN/1.73
ERYTHROCYTE [DISTWIDTH] IN BLOOD BY AUTOMATED COUNT: 12.5 % (ref 12.3–15.4)
F5 GENE MUT ANL BLD/T: NORMAL
FACT V ACT/NOR PPP: 133 % (ref 70–150)
GLUCOSE SERPL-MCNC: 118 MG/DL (ref 65–99)
GLUCOSE SERPL-MCNC: 130 MG/DL (ref 65–99)
GLUCOSE SERPL-MCNC: 132 MG/DL (ref 65–99)
HCT VFR BLD AUTO: 42.4 % (ref 37.5–51)
HGB BLD-MCNC: 14.5 G/DL (ref 13–17.7)
LA 2 SCREEN W REFLEX-IMP: NORMAL
MCH RBC QN AUTO: 32.7 PG (ref 26.6–33)
MCHC RBC AUTO-ENTMCNC: 34.2 G/DL (ref 31.5–35.7)
MCV RBC AUTO: 95.7 FL (ref 79–97)
OSMOLALITY SERPL: 292 MOSM/KG (ref 275–300)
OSMOLALITY SERPL: 294 MOSM/KG (ref 275–300)
PLATELET # BLD AUTO: 318 10*3/MM3 (ref 140–450)
PMV BLD AUTO: 8.8 FL (ref 6–12)
POTASSIUM SERPL-SCNC: 3.8 MMOL/L (ref 3.5–5.2)
POTASSIUM SERPL-SCNC: 3.8 MMOL/L (ref 3.5–5.2)
POTASSIUM SERPL-SCNC: 4.4 MMOL/L (ref 3.5–5.2)
POTASSIUM SERPL-SCNC: 4.4 MMOL/L (ref 3.5–5.2)
PROT C ACT/NOR PPP: 109 % (ref 86–163)
PROT C AG ACT/NOR PPP IA: 118 % (ref 60–150)
PROT S ACT/NOR PPP: 145 % (ref 70–127)
PROT S AG ACT/NOR PPP IA: 94 % (ref 60–150)
PROT S FREE PPP-ACNC: >150 % (ref 49–138)
RBC # BLD AUTO: 4.43 10*6/MM3 (ref 4.14–5.8)
SCREEN APTT: 31.2 SEC (ref 0–43.5)
SCREEN DRVVT: 37.8 SEC (ref 0–47)
SODIUM SERPL-SCNC: 139 MMOL/L (ref 136–145)
SODIUM SERPL-SCNC: 140 MMOL/L (ref 136–145)
SODIUM SERPL-SCNC: 140 MMOL/L (ref 136–145)
SODIUM SERPL-SCNC: 141 MMOL/L (ref 136–145)
SODIUM SERPL-SCNC: 141 MMOL/L (ref 136–145)
THROMBIN TIME: 16.2 SEC (ref 0–23)
WBC NRBC COR # BLD AUTO: 10.21 10*3/MM3 (ref 3.4–10.8)

## 2024-04-12 PROCEDURE — 94799 UNLISTED PULMONARY SVC/PX: CPT

## 2024-04-12 PROCEDURE — 99231 SBSQ HOSP IP/OBS SF/LOW 25: CPT | Performed by: NEUROLOGICAL SURGERY

## 2024-04-12 PROCEDURE — 94761 N-INVAS EAR/PLS OXIMETRY MLT: CPT

## 2024-04-12 PROCEDURE — 84295 ASSAY OF SERUM SODIUM: CPT | Performed by: STUDENT IN AN ORGANIZED HEALTH CARE EDUCATION/TRAINING PROGRAM

## 2024-04-12 PROCEDURE — 83930 ASSAY OF BLOOD OSMOLALITY: CPT | Performed by: STUDENT IN AN ORGANIZED HEALTH CARE EDUCATION/TRAINING PROGRAM

## 2024-04-12 PROCEDURE — 25010000002 CEFTRIAXONE PER 250 MG: Performed by: HOSPITALIST

## 2024-04-12 PROCEDURE — 97535 SELF CARE MNGMENT TRAINING: CPT

## 2024-04-12 PROCEDURE — 97530 THERAPEUTIC ACTIVITIES: CPT

## 2024-04-12 PROCEDURE — 80048 BASIC METABOLIC PNL TOTAL CA: CPT | Performed by: STUDENT IN AN ORGANIZED HEALTH CARE EDUCATION/TRAINING PROGRAM

## 2024-04-12 PROCEDURE — 80048 BASIC METABOLIC PNL TOTAL CA: CPT | Performed by: INTERNAL MEDICINE

## 2024-04-12 PROCEDURE — 85027 COMPLETE CBC AUTOMATED: CPT | Performed by: INTERNAL MEDICINE

## 2024-04-12 PROCEDURE — 99232 SBSQ HOSP IP/OBS MODERATE 35: CPT | Performed by: STUDENT IN AN ORGANIZED HEALTH CARE EDUCATION/TRAINING PROGRAM

## 2024-04-12 PROCEDURE — 70450 CT HEAD/BRAIN W/O DYE: CPT

## 2024-04-12 PROCEDURE — 25010000002 HYDRALAZINE PER 20 MG: Performed by: HOSPITALIST

## 2024-04-12 PROCEDURE — 84132 ASSAY OF SERUM POTASSIUM: CPT | Performed by: INTERNAL MEDICINE

## 2024-04-12 PROCEDURE — 97112 NEUROMUSCULAR REEDUCATION: CPT

## 2024-04-12 RX ADMIN — POTASSIUM CHLORIDE 40 MEQ: 1.5 POWDER, FOR SOLUTION ORAL at 00:20

## 2024-04-12 RX ADMIN — ASPIRIN 325 MG: 325 TABLET ORAL at 09:38

## 2024-04-12 RX ADMIN — HYDRALAZINE HYDROCHLORIDE 10 MG: 20 INJECTION INTRAMUSCULAR; INTRAVENOUS at 02:12

## 2024-04-12 RX ADMIN — POTASSIUM CHLORIDE 40 MEQ: 1.5 POWDER, FOR SOLUTION ORAL at 04:15

## 2024-04-12 RX ADMIN — CEFTRIAXONE 2000 MG: 2 INJECTION, POWDER, FOR SOLUTION INTRAMUSCULAR; INTRAVENOUS at 09:26

## 2024-04-12 RX ADMIN — HYDRALAZINE HYDROCHLORIDE 10 MG: 20 INJECTION INTRAMUSCULAR; INTRAVENOUS at 21:42

## 2024-04-12 RX ADMIN — Medication 1 TABLET: at 09:39

## 2024-04-12 RX ADMIN — ATORVASTATIN CALCIUM 80 MG: 80 TABLET, FILM COATED ORAL at 20:58

## 2024-04-12 RX ADMIN — Medication 10 ML: at 20:58

## 2024-04-12 RX ADMIN — AMLODIPINE BESYLATE 10 MG: 10 TABLET ORAL at 09:38

## 2024-04-12 RX ADMIN — Medication 100 MG: at 09:44

## 2024-04-12 RX ADMIN — FOLIC ACID 1 MG: 1 TABLET ORAL at 09:39

## 2024-04-12 NOTE — PROGRESS NOTES
"DOS: 2024  NAME: Stephane Jaimes   : 1970  PCP: System, Provider Not In  Chief Complaint   Patient presents with    Alcohol Intoxication       Chief complaint: Right gaze preference and ataxia  Subjective: Patient has been seen and evaluated, no acute events overnight repeat CT head this morning is stable, and is alert awake oriented no deficits, we can stop 3% on transferring to [facility].    Objective:  Vital signs: /81   Pulse 89   Temp 98.1 °F (36.7 °C) (Oral)   Resp 18   Ht 170.2 cm (67\")   Wt 89 kg (196 lb 3.4 oz)   SpO2 99%   BMI 30.73 kg/m²    Gen: NAD, vitals reviewed  MS: Alert awake oriented x 2 normal comprehension repetition and fluent  CN: visual acuity grossly normal, normal gaze, no facial droop, no dysarthria, no clear double vision  Motor: 5/5 throughout upper and lower extremities, normal tone  Ataxia    Laboratory results:  Lab Results   Component Value Date    GLUCOSE 118 (H) 2024    CALCIUM 8.7 2024     2024    K 4.4 2024    K 4.4 2024    CO2 17.7 (L) 2024     (H) 2024    BUN 12 2024    CREATININE 0.74 (L) 2024    BCR 16.2 2024    ANIONGAP 10.3 2024     Lab Results   Component Value Date    WBC 10.21 2024    HGB 14.5 2024    HCT 42.4 2024    MCV 95.7 2024     2024     Lab Results   Component Value Date     (H) 2024         Lab 24  0521   HEMOGLOBIN A1C 6.00*        Review of labs:   Sodium 139  Creatinine 0.74  Blood glucose 119    A1c 6.0      WBC 10.21  Hemoglobin 14.5 and platelets 318   Urinalysis negative  Urine tox positive only for ethanol 170     Imaging review:      CT T scan on admission for 24 showed no acute hypodensity or hypodensity     CTA Head and Neck - Rt veretebral hypoplastic and filling defect on the left vertebral with evidence of possible thrombus, good flow throw Left vertebral      CTP- No core or penumbra   "   TTE 70% EF normal LAE no clot no thrombus     Repeat CT head 4/11/2024 stable     MRI brain showed shows left cerebellar infarct along with left dick stroke, mild T2 flair changes no SWI change     MRA head and neck no significant vessel stenosis workup for but right vertebral nondominant with minimal flow in the V4     Diagnoses:  Acute Left cerebellar ischemic stroke with brain compression   Acute metabolic encephalopathy  Alcohol abuse  Hypertension on amlodipine  Concern for aspiration pneumonia on ceftriaxone     1.Acute Left cerebellar ischemic stroke with brain compression, left pontine stroke  Out of the window for TNK   Completed infarct on CT Head so no intervention   Etiology of the stroke most likely ESUS  -Continue Aspirin 325 mg daily and Lipitor 80 mg daily   -Was previously on 3% can stop right now since repeat scans have been stable  - MRI and MRA neck w/wo left dick and left cerebellar infarct and normal flow through the left vertebral artery  - Possible non occlusive thrombus on the left vertebral but could not visualize on the MRA neck  -Hypercoag panel ordered  -Neuro surgery previously on board they signed off  - Repeat CT Head this AM stable   -Patient is not a candidate for anticoagulation right now so might need outpatient KYLE with cardiology and also Zio patch on discharge     2. Alcohol Withdrawal   - On CIWA protocol   -Continue IV vitamins and IV thiamine replacement  -Provided substance abuse counseling at bedside    Okay to discontinue 3% hypertonic saline  Plan to send him to [facility] and work with PT OT and based on PT recommendations can be discharged.      MDM   Reviewed: Previous charts, nursing notes and vitals   Reviewed: Previous labs and CT scan    Interpretation: Labs and CT scan   Total time providing critical care is :30-74 minutes. This excluded time spent performing separately reportable procedures and services  Consults :Neurology/Stroke    Please note that portions of  this note were completed with a voice recognition program.     Cheng Camacho MD  Neuro Hospitalist /Vascular Neurology.

## 2024-04-12 NOTE — PLAN OF CARE
Goal Outcome Evaluation:      Pt remains on 3% saline.  Pt has been clearer and cooperative.  To CT this am.  Pt had an uneventful shift.

## 2024-04-12 NOTE — PROGRESS NOTES
Name: Stephane Jaimes ADMIT: 2024   : 1970  PCP: System, Provider Not In    MRN: [id number] LOS: 2 days   AGE/SEX: 53 y.o. male  ROOM: [facility]     Subjective   Subjective   No acute events overnight.  Patient has been cleared to come out of the ICU by neurosurgery.  Complains of a bit of a headache at my time of evaluation but otherwise no complaints.  Vital signs stable.    Objective   Objective     Vital Signs  Temp:  [97.4 °F (36.3 °C)-98.3 °F (36.8 °C)] 98 °F (36.7 °C)  Heart Rate:  [65-96] 89  Resp:  [18] 18  BP: (106-177)/() 133/81  SpO2:  [81 %-100 %] 99 %  on   ;   Device (Oxygen Therapy): room air  Body mass index is 30.73 kg/m².    Physical Exam  General: Sleeping but arouses easily.  No distress.  ENT: No conjunctival injection or scleral icterus. Moist mucous membranes.   Neuro: Eyes open and moving in all directions, strength normal in all extremities, no focal deficits.   Lungs: Clear to auscultation bilaterally. No wheeze or crackles. No distress.   Heart: RRR, no murmurs. No edema.  Abdomen: Obese.  Soft, non-tender, non-distended. Normal bowel sounds.   Ext: Warm and well-perfused. No edema.   Skin: No rashes or lesions. IV site without swelling or erythema.     Results Review     I reviewed the patient's new clinical results:  Results from last 7 days   Lab Units 2457 24  0521 04/10/24  0557 24  0302   WBC 10*3/mm3 10.21 9.78 11.50* 12.25*   HEMOGLOBIN g/dL 14.5 14.5 14.6 15.1   PLATELETS 10*3/mm3 318 277 279 266     Results from last 7 days   Lab Units 24  0557 24  2019 24  1826 24  1150 24  0828 24  0521   SODIUM mmol/L 139 141 141 140 143 140  140   POTASSIUM mmol/L 4.4  4.4 3.6  --   --  3.9 4.0  4.0   CHLORIDE mmol/L 111* 111*  --   --  112* 110*   CO2 mmol/L 17.7* 20.0*  --   --  20.0* 19.0*   BUN mg/dL 12 12  --   --  10 10   CREATININE mg/dL 0.74* 0.83  --   --  0.77 0.74*   GLUCOSE mg/dL 118* 103*  --   --  123* 94    EGFR mL/min/1.73 109.7 106.0  --   --  108.4 109.7     Results from last 7 days   Lab Units 04/09/24  0302 04/06/24  2133   ALBUMIN g/dL 4.0 4.2   BILIRUBIN mg/dL 0.5 <0.2   ALK PHOS U/L 106 123*   AST (SGOT) U/L 28 42*   ALT (SGPT) U/L 32 47*     Results from last 7 days   Lab Units 04/12/24  0557 04/11/24 2019 04/11/24  0828 04/11/24  0521 04/09/24  1823 04/09/24  0302 04/08/24 0227 04/06/24  2133   CALCIUM mg/dL 8.7 8.3* 8.3* 8.2*   < > 8.5*   < > 8.1*   ALBUMIN g/dL  --   --   --   --   --  4.0  --  4.2   MAGNESIUM mg/dL  --   --   --   --   --  2.1  --   --    PHOSPHORUS mg/dL  --   --   --   --   --  2.4*  --   --     < > = values in this interval not displayed.     Results from last 7 days   Lab Units 04/08/24  0227 04/07/24  2016 04/07/24  1421 04/07/24  1114 04/07/24  0510 04/06/24  2133   PROCALCITONIN ng/mL  --   --   --   --   --  0.06   LACTATE mmol/L 2.2* 3.2* 2.3* 3.0*   < >  --     < > = values in this interval not displayed.     Hemoglobin A1C   Date/Time Value Ref Range Status   04/11/2024 0521 6.00 (H) 4.80 - 5.60 % Final     Glucose   Date/Time Value Ref Range Status   04/11/2024 2224 119 70 - 130 mg/dL Final   04/09/2024 1727 123 70 - 130 mg/dL Final       CT Head Without Contrast    Result Date: 4/12/2024  Electronically signed by Diana Ordoñez MD on 04-12-24 at 0712    FL Video Swallow Single Contrast    Result Date: 4/11/2024  Fluoroscopy was provided for the speech pathologist during a video swallow study. For full details please see the speech pathology report    This report was finalized on 4/11/2024 4:56 PM by Dr. Eder Rebolledo M.D on Workstation: [id number]      CT Head Without Contrast    Result Date: 4/11/2024  Electronically signed by Diana Ordoñez MD on 04-11-24 at 0629    MRI Angiogram Head Without Contrast    Result Date: 4/10/2024   1. Redemonstration of the large subacute infarct involving the majority of the left cerebellar hemisphere and the cerebellar vermis with associated  mass effect on the dick and basilar cisterns and mild hydronephrosis. 2. Redemonstration of no flow in the left superior cerebellar artery. 3. Similar-appearing lack of flow in the proximal and mid right vertebral artery with minimal flow in the proximal right V4 segment and no flow seen distally in the right V4 segment. 4. Small foci of restricted diffusion and high T2/FLAIR signal in the dick, also likely subacute infarcts. 5. Scattered periventricular and subcortical white matter high T2/FLAIR signal, statistically representing chronic small vessel ischemic changes. 6. Normal variant left persistent posterior fetal circulation.  This report was finalized on 4/10/2024 6:43 PM by Gelacio Terrazas MD on Workstation: [id number]      MRI Brain With & Without Contrast    Result Date: 4/10/2024   1. Redemonstration of the large subacute infarct involving the majority of the left cerebellar hemisphere and the cerebellar vermis with associated mass effect on the dick and basilar cisterns and mild hydronephrosis. 2. Redemonstration of no flow in the left superior cerebellar artery. 3. Similar-appearing lack of flow in the proximal and mid right vertebral artery with minimal flow in the proximal right V4 segment and no flow seen distally in the right V4 segment. 4. Small foci of restricted diffusion and high T2/FLAIR signal in the dick, also likely subacute infarcts. 5. Scattered periventricular and subcortical white matter high T2/FLAIR signal, statistically representing chronic small vessel ischemic changes. 6. Normal variant left persistent posterior fetal circulation.  This report was finalized on 4/10/2024 6:43 PM by Gelacio Terrazas MD on Workstation: [id number]      MRI Angiogram Neck With & Without Contrast    Result Date: 4/10/2024   1. Redemonstration of the large subacute infarct involving the majority of the left cerebellar hemisphere and the cerebellar vermis with associated mass effect on the dick and basilar cisterns  and mild hydronephrosis. 2. Redemonstration of no flow in the left superior cerebellar artery. 3. Similar-appearing lack of flow in the proximal and mid right vertebral artery with minimal flow in the proximal right V4 segment and no flow seen distally in the right V4 segment. 4. Small foci of restricted diffusion and high T2/FLAIR signal in the dick, also likely subacute infarcts. 5. Scattered periventricular and subcortical white matter high T2/FLAIR signal, statistically representing chronic small vessel ischemic changes. 6. Normal variant left persistent posterior fetal circulation.  This report was finalized on 4/10/2024 6:43 PM by Gelacio Terrazas MD on Workstation: [id number]       I have personally reviewed all medications:  Scheduled Medications  amLODIPine, 10 mg, Oral, Q24H  aspirin, 325 mg, Oral, Daily   Or  aspirin, 300 mg, Rectal, Daily  atorvastatin, 80 mg, Oral, Nightly  folic acid, 1 mg, Oral, Daily  multivitamin with minerals, 1 tablet, Oral, Daily  thiamine, 100 mg, Oral, Daily    Infusions   Diet  Diet: Regular/House; Texture: Pureed (NDD 1); Fluid Consistency: Honey Thick    Assessment/Plan     Active Hospital Problems    Diagnosis  POA    **Alcohol intoxication [F10.929]  Yes    Cerebellar stroke [I63.9]  Unknown    Cerebral edema [G93.6]  Unknown    HTN (hypertension) [I10]  Unknown    Transaminitis [R74.01]  Unknown      Resolved Hospital Problems   No resolved problems to display.       53 y.o. male with Alcohol intoxication.    Acute left cerebellar ischemic stroke with brain compression  Dysphagia  -MRI and MRA revealing left dick and left cerebellar infarct  -Echocardiogram with EF 70.8%, normal diastolic function  -Neurology and neurosurgery consulted, appreciate their assistance  -Initial imaging for healed obstructive hydrocephalus involving the lateral and third ventricles  -Patient has been in ICU for hypertonic saline and q2 hour neurochecks  -Repeat head CT this morning  stable  -Neurosurgery recommends weaning off hypertonic saline today and transfer out of the ICU  -Working with SLP after stroke.  Current recommendation is for purée and honey thick liquids.   -PT/OT recommending acute inpatient rehab they will continue to evaluate.    Anion gap metabolic acidosis  -Carbonate low at 17.7, normal anion gap  -Suspect this is due to hyperchloremia due to hypertonic saline  -HTS discontinued today  -Monitor with daily BMP    Alcohol use disorder with concern for withdrawal  -Continue CIWA protocol, Ativan as needed for withdrawal  -Thiamine and folate  -Access center consult    HTN  -Blood pressure acceptable at this time  -Continue amlodipine      SCDs for DVT prophylaxis.  Full code.  Discussed with patient and nursing staff.  Anticipate discharge TBD      Jenn June MD  [facility]  04/12/24  12:32 EDT

## 2024-04-12 NOTE — THERAPY TREATMENT NOTE
Patient Name: Stephane Jaimes  : 1970    MRN: [id number]                              Today's Date: 2024       Admit Date: 2024    Visit Dx:     ICD-10-CM ICD-9-CM   1. Alcoholic intoxication without complication  F10.920 305.00   2. Transaminitis  R74.01 790.4   3. High anion gap metabolic acidosis  E87.29 276.2     Patient Active Problem List   Diagnosis    Alcohol intoxication    Transaminitis    HTN (hypertension)    Cerebellar stroke    Cerebral edema     History reviewed. No pertinent past medical history.  History reviewed. No pertinent surgical history.   General Information       Row Name 24 1308          Physical Therapy Time and Intention    Document Type therapy note (daily note)  -EM (r) JG (t) EM (c)     Mode of Treatment physical therapy;co-treatment  -EM (r) JG (t) EM (c)       Row Name 24 1308          General Information    Patient Profile Reviewed yes  -EM (r) JG (t) EM (c)     Existing Precautions/Restrictions fall  -EM (r) JG (t) EM (c)     Barriers to Rehab language barrier;medically complex  -EM (r) JG (t) EM (c)       Row Name 24 1308          Cognition    Orientation Status (Cognition) oriented x 3  -EM (r) JG (t) EM (c)       Row Name 24 1308          Safety Issues, Functional Mobility    Impairments Affecting Function (Mobility) postural/trunk control;endurance/activity tolerance;balance;cognition;strength;coordination;visual/perceptual  -EM (r) JG (t) EM (c)     Cognitive Impairments, Mobility Safety/Performance judgment;safety precaution awareness;safety precaution follow-through;insight into deficits/self-awareness  -EM (r) JG (t) EM (c)               User Key  (r) = Recorded By, (t) = Taken By, (c) = Cosigned By      Initials Name Provider Type    Carolyn Armendariz PT Physical Therapist    Reynaldo Garcia PT Student PT Student                   Mobility       Row Name 24 130          Bed Mobility    Bed Mobility supine-sit;sit-supine   -EM (r) JG (t) EM (c)     Supine-Sit [place] (Bed Mobility) minimum assist (75% patient effort);2 person assist;verbal cues  -EM (r) JG (t) EM (c)     Sit-Supine [place] (Bed Mobility) standby assist;verbal cues  -EM (r) JG (t) EM (c)     Assistive Device (Bed Mobility) bed rails;head of bed elevated;draw sheet  -EM (r) JG (t) EM (c)     Comment, (Bed Mobility) cues for sequencing needed  -EM (r) JG (t) EM (c)       Row Name 04/12/24 1309          Sit-Stand Transfer    Sit-Stand [place] (Transfers) moderate assist (50% patient effort);2 person assist;verbal cues  -EM (r) JG (t) EM (c)     Comment, (Sit-Stand Transfer) HHAx2. Ataxic movements with all activity  -EM (r) JG (t) EM (c)               User Key  (r) = Recorded By, (t) = Taken By, (c) = Cosigned By      Initials Name Provider Type    EM Carolyn Schofield, PT Physical Therapist    Reynaldo Garcia PT Student PT Student                   Obj/Interventions       Row Name 04/12/24 1313          Balance    Balance Assessment sitting static balance;sitting dynamic balance;sit to stand dynamic balance;standing static balance  -EM (r) JG (t) EM (c)     Static Sitting Balance contact guard;verbal cues  -EM (r) JG (t) EM (c)     Dynamic Sitting Balance contact guard;verbal cues  -EM (r) JG (t) EM (c)     Position, Sitting Balance unsupported;sitting edge of bed  -EM (r) JG (t) EM (c)     Sit to Stand Dynamic Balance moderate assist;2-person assist  -EM (r) JG (t) EM (c)     Static Standing Balance moderate assist;2-person assist;verbal cues;non-verbal cues (demo/gesture)  -EM (r) JG (t) EM (c)     Position/Device Used, Standing Balance supported  HHAx2  -EM (r) JG (t) EM (c)     Comment, Balance ataxic movements with standing balance  -EM (r) JG (t) EM (c)               User Key  (r) = Recorded By, (t) = Taken By, (c) = Cosigned By      Initials Name Provider Type    Carolyn Armendariz, PT Physical Therapist    Reynaldo Garcia, PT Student PT  Student                   Goals/Plan    No documentation.                  Clinical Impression       Row Name 04/12/24 1315          Pain    Pretreatment Pain Rating 0/10 - no pain  -EM (r) JG (t) EM (c)     Pain Location - Side/Orientation Bilateral  -EM (r) JG (t) EM (c)     Pain Location - knee;eye  -EM (r) JG (t) EM (c)     Pre/Posttreatment Pain Comment pt complains of B knee and eye pain during session but did not assign numerical value  -EM (r) JG (t) EM (c)     Pain Intervention(s) Repositioned;Rest  -EM (r) JG (t) EM (c)       Row Name 04/12/24 1315          Plan of Care Review    Plan of Care Reviewed With patient  -EM (r) JG (t) EM (c)     Progress no change  -EM (r) JG (t) EM (c)     Outcome Evaluation Pt agreeable to therapy this date. Pt supine in bed when PT/OT arrived.  Ipad used this date. Supine -> sit with MinAx2 and cues for bed rail use. Pt able to sit at EOB wiht Min-ModAx1 secondary to ataxic movements. STS from EOB with ModAx2 and HHAx2. Pt with complaints of eye and B knee pain throughout session. Pt able to stand with HHAx2 and fluctuating levels of assist, dependent on levels of ataxia. Pt unable to tolerate ambulation this date. Pt able to perform sit -> supine with SBA. Pt in supine when PT/OT left the room. Continue to progress as indicated.  -EM (r) JG (t) EM (c)       Row Name 04/12/24 1315          Positioning and Restraints    Pre-Treatment Position in bed  -EM (r) JG (t) EM (c)     Post Treatment Position bed  -EM (r) JG (t) EM (c)     In Bed supine;call light within reach;encouraged to call for assist;exit alarm on;side rails up x3  -EM (r) JG (t) EM (c)               User Key  (r) = Recorded By, (t) = Taken By, (c) = Cosigned By      Initials Name Provider Type    EM Carolyn Schofield, PT Physical Therapist    Reynaldo Garcia PT Student PT Student                   Outcome Measures       Row Name 04/12/24 1326          How much help from another person do you  currently need...    Turning from your back to your side while in flat bed without using bedrails? 3  -EM (r) JG (t) EM (c)     Moving from lying on back to sitting on the side of a flat bed without bedrails? 3  -EM (r) JG (t) EM (c)     Moving to and from a bed to a chair (including a wheelchair)? 2  -EM (r) JG (t) EM (c)     Standing up from a chair using your arms (e.g., wheelchair, bedside chair)? 2  -EM (r) JG (t) EM (c)     Climbing 3-5 steps with a railing? 1  -EM (r) JG (t) EM (c)     To walk in hospital room? 1  -EM (r) JG (t) EM (c)     AM-PAC 6 Clicks Score (PT) 12  -EM (r) JG (t)     Highest Level of Mobility Goal 4 --> Transfer to chair/commode  -EM (r) JG (t)       Row Name 04/12/24 1326          Functional Assessment    Outcome Measure Options AM-PAC 6 Clicks Basic Mobility (PT)  -EM (r) JG (t) EM (c)               User Key  (r) = Recorded By, (t) = Taken By, (c) = Cosigned By      Initials Name Provider Type    EM Carolyn Schofield, PT Physical Therapist    Reynaldo Garcia, PT Student PT Student                                 Physical Therapy Education       Title: PT OT SLP Therapies (In Progress)       Topic: Physical Therapy (Done)       Point: Mobility training (Done)       Learning Progress Summary             Patient Acceptance, E, VU by CORNELIO at 4/12/2024 1326    Acceptance, E,I, NR by MOI at 4/11/2024 1033                         Point: Home exercise program (Done)       Learning Progress Summary             Patient Acceptance, E, VU by CORNELIO at 4/12/2024 1326                         Point: Body mechanics (Done)       Learning Progress Summary             Patient Acceptance, E, VU by CORNELIO at 4/12/2024 1326    Acceptance, E,I, NR by MOI at 4/11/2024 1033                         Point: Precautions (Done)       Learning Progress Summary             Patient Acceptance, E, VU by CORNELIO at 4/12/2024 1326    Acceptance, E,I, NR by MOI at 4/11/2024 1033                                         User Key        Initials Effective Dates Name Provider Type Discipline    DJ 10/25/19 -  Amira Gonzalez, PT Physical Therapist PT    J 02/29/24 -  Reynaldo Lindsey, PT Student PT Student PT                  PT Recommendation and Plan     Plan of Care Reviewed With: patient  Progress: no change  Outcome Evaluation: Pt agreeable to therapy this date. Pt supine in bed when PT/OT arrived.  Ipad used this date. Supine -> sit with MinAx2 and cues for bed rail use. Pt able to sit at EOB wiht Min-ModAx1 secondary to ataxic movements. STS from EOB with ModAx2 and HHAx2. Pt with complaints of eye and B knee pain throughout session. Pt able to stand with HHAx2 and fluctuating levels of assist, dependent on levels of ataxia. Pt unable to tolerate ambulation this date. Pt able to perform sit -> supine with SBA. Pt in supine when PT/OT left the room. Continue to progress as indicated.     Time Calculation:         PT Charges       Row Name 04/12/24 1326             Time Calculation    Start Time 1045  -EM (r) JG (t) EM (c)      Stop Time 1108  -EM (r) JG (t) EM (c)      Time Calculation (min) 23 min  -EM (r) JG (t)      PT Received On 04/12/24  -EM (r) JG (t) EM (c)      PT - Next Appointment 04/13/24  -EM (r) JG (t) EM (c)         Time Calculation- PT    Total Timed Code Minutes- PT 23 minute(s)  -EM (r) JG (t) EM (c)         Timed Charges    06031 - PT Therapeutic Activity Minutes 23  -EM (r) JG (t) EM (c)         Total Minutes    Timed Charges Total Minutes 23  -EM (r) JG (t)       Total Minutes 23  -EM (r) JG (t)                User Key  (r) = Recorded By, (t) = Taken By, (c) = Cosigned By      Initials Name Provider Type    EM Carolyn Schofield, PT Physical Therapist    JG Reynaldo Lindsey, PT Student PT Student                  Therapy Charges for Today       Code Description Service Date Service Provider Modifiers Qty    90097659796 HC PT THERAPEUTIC ACT EA 15 MIN 4/12/2024 Reynaldo Lindsey, PT Student GP 2            PT  G-Codes  Outcome Measure Options: AM-PAC 6 Clicks Basic Mobility (PT)  AM-PAC 6 Clicks Score (PT): 12  AM-PAC 6 Clicks Score (OT): 13  Modified Roxana Scale: 4 - Moderately severe disability.  Unable to walk without assistance, and unable to attend to own bodily needs without assistance.       Reynaldo Lindsey, PT Student  4/12/2024

## 2024-04-12 NOTE — NURSING NOTE
Access Center follow-up d/t ETOH.    Patient RIB. The patient reported he is feeling better but has a headache. The patient has been cleared by doctors to be transferred out of the ICU.  The [facility] counselor had previously visited with the patient and provided him with [facility] treatment resources. The patient denied any questions. Access following.

## 2024-04-12 NOTE — THERAPY TREATMENT NOTE
Patient Name: Stephane Jaimes  : 1970    MRN: [id number]                              Today's Date: 2024       Admit Date: 2024    Visit Dx:     ICD-10-CM ICD-9-CM   1. Alcoholic intoxication without complication  F10.920 305.00   2. Transaminitis  R74.01 790.4   3. High anion gap metabolic acidosis  E87.29 276.2     Patient Active Problem List   Diagnosis    Alcohol intoxication    Transaminitis    HTN (hypertension)    Cerebellar stroke    Cerebral edema     History reviewed. No pertinent past medical history.  History reviewed. No pertinent surgical history.   General Information       Row Name 24 1238          OT Time and Intention    Document Type therapy note (daily note)  -     Mode of Treatment occupational therapy;co-treatment  -       Row Name 24 1238          General Information    Patient Profile Reviewed yes  -     Existing Precautions/Restrictions fall  -       Row Name 24 1238          Cognition    Orientation Status (Cognition) oriented x 3  -       Row Name 24 Pending sale to [facility]8          Safety Issues, Functional Mobility    Impairments Affecting Function (Mobility) postural/trunk control;endurance/activity tolerance;balance;cognition;strength;coordination;visual/perceptual  -     Comment, Safety Issues/Impairments (Mobility) Pt is co-tx appropriate d/t decreased activity tolerance and to maximize pt participation and safety with functional activity.  -               User Key  (r) = Recorded By, (t) = Taken By, (c) = Cosigned By      Initials Name Provider Type     Lita Nguyen OT Occupational Therapist                     Mobility/ADL's       Row Name 24 1239          Bed Mobility    Bed Mobility supine-sit;sit-supine  -     Supine-Sit [place] (Bed Mobility) minimum assist (75% patient effort);2 person assist;verbal cues  -     Sit-Supine [place] (Bed Mobility) standby assist;verbal cues  -     Assistive Device (Bed Mobility) bed rails;head  of bed elevated;draw sheet  -[facility] Name 04/12/24 1239          Transfers    Transfers sit-stand transfer  -       Row Name 04/12/24 1239          Sit-Stand Transfer    Sit-Stand [place] (Transfers) minimum assist (75% patient effort);moderate assist (50% patient effort);2 person assist;verbal cues  -     Comment, (Sit-Stand Transfer) HHA. ataxic  -[facility] Name 04/12/24 1239          Functional Mobility    Functional Mobility- Ind. Level moderate assist (50% patient effort);2 person assist required  -     Functional Mobility- Comment pt able to take a few lateral steps towards HOB, ataxic and unsteady with poor motor control  -[facility] Name 04/12/24 1239          Activities of Daily Living    BADL Assessment/Intervention lower body dressing  -[facility] Name 04/12/24 1239          Lower Body Dressing Assessment/Training    [place] Level (Lower Body Dressing) don;socks;minimum assist (75% patient effort)  -     Position (Lower Body Dressing) edge of bed sitting  -     Comment, (Lower Body Dressing) assist for sitting balance with LB dressing task/fxl reac d/t coordination deficits and impaired sitting balance and vision  -               User Key  (r) = Recorded By, (t) = Taken By, (c) = Cosigned By      Initials Name Provider Type     Lita Nguyen OT Occupational Therapist                   Obj/Interventions       [facility] Name 04/12/24 1241          Vision Assessment/Intervention    Visual Impairment/Limitations peripheral vision impaired right;visual/perceptual impairments present  -     Vision Assessment Comment blurred vision and impaired peripheral vision.  -[facility] Name 04/12/24 1241          Balance    Static Sitting Balance minimal assist  -     Dynamic Sitting Balance minimal assist;moderate assist  -     Position, Sitting Balance sitting edge of bed;supported  -     Static Standing Balance moderate assist;2-person assist;minimal assist  -     Dynamic  Standing Balance moderate assist;2-person assist  -     Balance Interventions sitting;standing;occupation based/functional task;dynamic reaching  -     Comment, Balance performs dyn reaching tasks while sitting EOB. Min-modA for sitting balance.  -               User Key  (r) = Recorded By, (t) = Taken By, (c) = Cosigned By      Initials Name Provider Type    Lita Morrison OT Occupational Therapist                   Goals/Plan    No documentation.                  Clinical Impression       Row Name 04/12/24 1243          Pain Assessment    Pain Location - eye  -     Pre/Posttreatment Pain Comment reports pain behind eyes but does not rate  -       Row Name 04/12/24 1243          Plan of Care Review    Plan of Care Reviewed With patient  -     Progress improving  -     Outcome Evaluation Pt was found supine in bed, agreeable to OOB ax,  ipad used. He sits EOB with MinAx2, able to don socks with min-modA for sitting balance and difficulty to complete d/t impaired coordination and vision. STS and lateral steps towards HOB with ModAx2, significant ataxia and unsteadiness noted. Pt participates in dynamic reaching tamiko UEs, motor control impaired bilaterally but worse on the L side. He returns to supine in bed with SBA, set-up for thickened drinks, all needs in reach.  -       Row Name 04/12/24 1243          Positioning and Restraints    Pre-Treatment Position in bed  -     Post Treatment Position bed  -     In Bed fowlers;call light within reach;encouraged to call for assist;exit alarm on;notified nsg  -               User Key  (r) = Recorded By, (t) = Taken By, (c) = Cosigned By      Initials Name Provider Type    Lita Morrison OT Occupational Therapist                   Outcome Measures    No documentation.                   Occupational Therapy Education       Title: PT OT SLP Therapies (In Progress)       Topic: Occupational Therapy (In Progress)       Point: ADL training (Done)        Description:   Instruct learner(s) on proper safety adaptation and remediation techniques during self care or transfers.   Instruct in proper use of assistive devices.                  Learning Progress Summary             Patient Acceptance, E, VU by  at 4/11/2024 5277    Comment: OT goals, POC, dc recommendations                         Point: Home exercise program (Not Started)       Description:   Instruct learner(s) on appropriate technique for monitoring, assisting and/or progressing therapeutic exercises/activities.                  Learner Progress:  Not documented in this visit.              Point: Precautions (Not Started)       Description:   Instruct learner(s) on prescribed precautions during self-care and functional transfers.                  Learner Progress:  Not documented in this visit.              Point: Body mechanics (Not Started)       Description:   Instruct learner(s) on proper positioning and spine alignment during self-care, functional mobility activities and/or exercises.                  Learner Progress:  Not documented in this visit.                              User Key       Initials Effective Dates Name Provider Type Discipline     04/02/20 -  Jenn Lewis, OT Occupational Therapist OT                  OT Recommendation and Plan     Plan of Care Review  Plan of Care Reviewed With: patient  Progress: improving  Outcome Evaluation: Pt was found supine in bed, agreeable to OOB ax,  ipad used. He sits EOB with MinAx2, able to don socks with min-modA for sitting balance and difficulty to complete d/t impaired coordination and vision. STS and lateral steps towards HOB with ModAx2, significant ataxia and unsteadiness noted. Pt participates in dynamic reaching tamiko UEs, motor control impaired bilaterally but worse on the L side. He returns to supine in bed with SBA, set-up for thickened drinks, all needs in reach.     Time Calculation:         Time Calculation- OT        Row Name 04/12/24 1254             Time Calculation- OT    OT Start Time 1044  -JW      OT Stop Time 1107  -JW      OT Time Calculation (min) 23 min  -      Total Timed Code Minutes- OT 23 minute(s)  -      OT Received On 04/12/24  -      OT - Next Appointment 04/15/24  -         Timed Charges    77758 -  OT Neuromuscular Reeducation Minutes 13  -JW      07151 - OT Self Care/Mgmt Minutes 10  -         Total Minutes    Timed Charges Total Minutes 23  -       Total Minutes 23  -                User Key  (r) = Recorded By, (t) = Taken By, (c) = Cosigned By      Initials Name Provider Type     Lita Nguyen OT Occupational Therapist                  Therapy Charges for Today       Code Description Service Date Service Provider Modifiers Qty    [id number]  OT NEUROMUSC RE EDUCATION EA 15 MIN 4/12/2024 Lita Nguyen OT GO 1    [id number]  OT SELF CARE/MGMT/TRAIN EA 15 MIN 4/12/2024 Lita Nguyen OT GO 1                 Lita Nguyen OT  4/12/2024

## 2024-04-12 NOTE — PLAN OF CARE
Goal Outcome Evaluation:  Plan of Care Reviewed With: patient        Progress: no change  Outcome Evaluation: Pt agreeable to therapy this date. Pt supine in bed when PT/OT arrived.  Ipad used this date. Supine -> sit with MinAx2 and cues for bed rail use. Pt able to sit at EOB wiht Min-ModAx1 secondary to ataxic movements. STS from EOB with ModAx2 and HHAx2. Pt with complaints of eye and B knee pain throughout session. Pt able to stand with HHAx2 and fluctuating levels of assist, dependent on levels of ataxia. Pt unable to tolerate ambulation this date. Pt able to perform sit -> supine with SBA. Pt in supine when PT/OT left the room. Continue to progress as indicated.

## 2024-04-12 NOTE — PROGRESS NOTES
[facility] INPATIENT PROGRESS NOTE         [facility] INTENSIVE CARE    2024      PATIENT IDENTIFICATION:  Name: Stephane Jaimes ADMIT: 2024   : 1970  PCP: System, Provider Not In    MRN: [id number] LOS: 2 days   AGE/SEX: 53 y.o. male  ROOM: [facility]                     LOS 2    Reason for visit: ICU medical management with subacute infarctions with mass effect hydrocephalus      SUBJECTIVE:      Alert.  Calm and cooperative.  No new issues overnight.  On room air.  Not requiring hemodynamic drips.No new issues overnight.  On hypertonic saline.  Not requiring pressors.    Objective   OBJECTIVE:    Vital Sign Min/Max for last 24 hours  Temp  Min: 97.4 °F (36.3 °C)  Max: 98.3 °F (36.8 °C)   BP  Min: 106/72  Max: 177/89   Pulse  Min: 65  Max: 98   Resp  Min: 18  Max: 18   SpO2  Min: 85 %  Max: 100 %   No data recorded   Weight  Min: 89 kg (196 lb 3.4 oz)  Max: 89 kg (196 lb 3.4 oz)    Vitals:    24 0500 24 0600 24 0753 24 0823   BP: 144/85 149/88     Pulse: 80 79     Resp:       Temp:   98.1 °F (36.7 °C)    TempSrc:   Oral    SpO2: 99% 93%  93%   Weight:  89 kg (196 lb 3.4 oz)     Height:                04/10/24  0420 24  0412 24  0600   Weight: 87.6 kg (193 lb 2 oz) 85.8 kg (189 lb 2.5 oz) 89 kg (196 lb 3.4 oz)       Body mass index is 30.73 kg/m².                          Body mass index is 30.73 kg/m².    Intake/Output Summary (Last 24 hours) at 2024 0923  Last data filed at 2024 0600  Gross per 24 hour   Intake 846 ml   Output 1650 ml   Net -804 ml         Exam:  GEN:  No distress, appears stated age  EYES:   PERRL, anicteric sclerae  ENT:    External ears/nose normal, OP clear  NECK:  No adenopathy, midline trachea  LUNGS: Normal chest on inspection, palpation and auscultation  CV:  Normal S1S2, without murmur  ABD:  Nontender, nondistended, no hepatosplenomegaly, +BS  EXT:  No edema.  No cyanosis or clubbing.  No mottling and normal cap  refill.    Assessment     Scheduled meds:  amLODIPine, 10 mg, Oral, Q24H  aspirin, 325 mg, Oral, Daily   Or  aspirin, 300 mg, Rectal, Daily  atorvastatin, 80 mg, Oral, Nightly  cefTRIAXone, 2,000 mg, Intravenous, Daily  folic acid, 1 mg, Oral, Daily  multivitamin with minerals, 1 tablet, Oral, Daily  thiamine, 100 mg, Oral, Daily      IV meds:                      sodium chloride, 50 mL/hr, Last Rate: 50 mL/hr (04/11/24 5969)      Data Review:  Results from last 7 days   Lab Units 04/12/24  0557 04/11/24 2019 04/11/24  1826 04/11/24  1150 04/11/24  0828 04/11/24  0521 04/10/24  2357 04/10/24  1935   SODIUM mmol/L 139 141 141 140 143 140  140   < > 137  138   POTASSIUM mmol/L 4.4  4.4 3.6  --   --  3.9 4.0  4.0  --  3.6   CHLORIDE mmol/L 111* 111*  --   --  112* 110*  --  106   CO2 mmol/L 17.7* 20.0*  --   --  20.0* 19.0*  --  22.0   BUN mg/dL 12 12  --   --  10 10  --  11   CREATININE mg/dL 0.74* 0.83  --   --  0.77 0.74*  --  0.89   GLUCOSE mg/dL 118* 103*  --   --  123* 94  --  97   CALCIUM mg/dL 8.7 8.3*  --   --  8.3* 8.2*  --  8.2*    < > = values in this interval not displayed.         Estimated Creatinine Clearance: 125.8 mL/min (A) (by C-G formula based on SCr of 0.74 mg/dL (L)).  Results from last 7 days   Lab Units 04/12/24 0557 04/11/24  0521 04/10/24  0557 04/09/24  0302 04/08/24  0227   WBC 10*3/mm3 10.21 9.78 11.50* 12.25* 12.07*   HEMOGLOBIN g/dL 14.5 14.5 14.6 15.1 14.7   PLATELETS 10*3/mm3 318 277 279 266 263     Results from last 7 days   Lab Units 04/06/24  2133   INR  1.01     Results from last 7 days   Lab Units 04/09/24  0302 04/06/24  2133   ALT (SGPT) U/L 32 47*   AST (SGOT) U/L 28 42*         Results from last 7 days   Lab Units 04/08/24  0227 04/07/24 2016 04/07/24  1421 04/07/24  1114 04/07/24  0806 04/07/24  0510 04/06/24  2133   PROCALCITONIN ng/mL  --   --   --   --   --   --  0.06   LACTATE mmol/L 2.2* 3.2* 2.3* 3.0* 3.8* 3.9*  --          Hemoglobin A1C   Date/Time Value Ref  Range Status   04/11/2024 0521 6.00 (H) 4.80 - 5.60 % Final     Glucose   Date/Time Value Ref Range Status   04/11/2024 2224 119 70 - 130 mg/dL Final   04/09/2024 1727 123 70 - 130 mg/dL Final         Imaging reviewed  Repeat CT head 4/11 reviewed      2D echo 4/9 reviewed: EF greater than 70%.  Normal diastolic function.          Microbiology reviewed  RVP negative.  MRSA screen negative.          Active Hospital Problems    Diagnosis  POA    **Alcohol intoxication [F10.929]  Yes    Cerebellar stroke [I63.9]  Unknown    Cerebral edema [G93.6]  Unknown    HTN (hypertension) [I10]  Unknown    Transaminitis [R74.01]  Unknown      Resolved Hospital Problems   No resolved problems to display.         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  Neurosurgery is cleared to wean off 3% saline and transfer out of intensive care.  He has never been in the range of benefit with hypertonic saline anyway from sodium standpoint.  Discontinue and will repeat BMP this afternoon.  Control glucose.  Control blood pressure.  Monitoring for alcohol withdrawal.  Antibiotics for presumed aspiration pneumonia present on admission.  Mechanical DVT prophylaxis until pharmacologic cleared by neurology/neurosurgery service.    Discussed with multidisciplinary ICU team on rounds this morning.            Kirk Bee MD  Pulmonary and Critical Care Medicine  [facility], [facility]  4/12/2024    09:23 EDT

## 2024-04-12 NOTE — PROGRESS NOTES
LOS: 2 days   Patient Care Team:  System, Provider Not In as PCP - General    Chief Complaint: Cerebellar infarct    Subjective     The patient is awake and alert and quite conversant.  He does not speak a lot of English so that makes it difficult but barring the language barrier he is otherwise completely with it.    Interval History:     History taken from: patient chart    Objective      He has good movement in both lower extremities and both upper extremities.    Vital Signs  Temp:  [97.4 °F (36.3 °C)-98.3 °F (36.8 °C)] 98.1 °F (36.7 °C)  Heart Rate:  [65-98] 79  Resp:  [18] 18  BP: (106-177)/() 149/88       Results Review:     I reviewed the patient's new clinical results.  His CTs were stable.  We did not do 1 this morning.  He has a large cerebellar infarct on the left.      Assessment & Plan       Alcohol intoxication    Transaminitis    HTN (hypertension)    Cerebellar stroke    Cerebral edema      From my point of view he can be gradually weaned off the 3% saline.  From my point of view he can be moved out of the ICU.  He should have a follow-up scan sometime next week.  Please let us know if there is anything further we can do.      Giuliano Garsia MD  04/12/24  09:12 EDT

## 2024-04-12 NOTE — PROGRESS NOTES
Enter Query Response Below      Query Response: Prophylactic use of thiamine              If applicable, please update the problem list.     Patient: Stephane Jaimes        : 1970  Account: [id number]           Admit Date:         How to Respond to this query:       a. Click New Note     b. Answer query within the yellow box.                c. Update the Problem List, if applicable.      If you have any questions about this query contact me at: [email or web address]    Dr. Bee,    Patient admitted with alcohol intoxication, CVA, episodes of confusion. Treatment includes IV thiamine.     Please clarify if patient treated/monitored for one or more of the following:  Thiamine deficiency  Prophylactic use of thiamine  Other- specify______  Unable to determine    By submitting this query, we are merely seeking further clarification of documentation to accurately reflect all conditions that you are monitoring, evaluating, treating or that extend the hospitalization or utilize additional resources of care. Please utilize your independent clinical judgment when addressing the question(s) above.     This query and your response, once completed, will be entered into the legal medical record.    Sincerely,  Nahomi Rodriguez RN BSN  Clinical Documentation Integrity Program

## 2024-04-12 NOTE — PROGRESS NOTES
Enter Query Response Below      Query Response:     Lactic acidosis-clinically significant          If applicable, please update the problem list.     Patient: Stephane Jaimes        : 1970  Account: [id number]           Admit Date:         How to Respond to this query:       a. Click New Note     b. Answer query within the yellow box.                c. Update the Problem List, if applicable.      If you have any questions about this query contact me at: [email or web address]    Dr. Bee,     Patient admitted with alcohol intoxication, brain compression, CVA. Lactic acid  3.9, 3.8, 3.0, 2.3, 3.2;  2.2. IVF during admission.  Progress notes state patient with lactic acidosis.    Please clarify the following:  Lactic acidosis-clinically significant  Lactic acidosis-clinically insignificant  Other- specify ___  Unable to determine    By submitting this query, we are merely seeking further clarification of documentation to accurately reflect all conditions that you are monitoring, evaluating, treating or that extend the hospitalization or utilize additional resources of care. Please utilize your independent clinical judgment when addressing the question(s) above.     This query and your response, once completed, will be entered into the legal medical record.    Sincerely,  Nahomi Rodriguez RN BSN  Clinical Documentation Integrity Program

## 2024-04-12 NOTE — PLAN OF CARE
Goal Outcome Evaluation:  Plan of Care Reviewed With: patient        Progress: improving  Outcome Evaluation: Pt was found supine in bed, agreeable to OOB ax,  ipad used. He sits EOB with MinAx2, able to don socks with min-modA for sitting balance and difficulty to complete d/t impaired coordination and vision. STS and lateral steps towards HOB with ModAx2, significant ataxia and unsteadiness noted. Pt participates in dynamic reaching tamiko UEs, motor control impaired bilaterally but worse on the L side. He returns to supine in bed with SBA, set-up for thickened drinks, all needs in reach.

## 2024-04-13 LAB
ANION GAP SERPL CALCULATED.3IONS-SCNC: 11 MMOL/L (ref 5–15)
ANION GAP SERPL CALCULATED.3IONS-SCNC: 12 MMOL/L (ref 5–15)
BUN SERPL-MCNC: 16 MG/DL (ref 6–20)
BUN SERPL-MCNC: 17 MG/DL (ref 6–20)
BUN/CREAT SERPL: 20 (ref 7–25)
BUN/CREAT SERPL: 22.2 (ref 7–25)
CALCIUM SPEC-SCNC: 9 MG/DL (ref 8.6–10.5)
CALCIUM SPEC-SCNC: 9.1 MG/DL (ref 8.6–10.5)
CHLORIDE SERPL-SCNC: 102 MMOL/L (ref 98–107)
CHLORIDE SERPL-SCNC: 106 MMOL/L (ref 98–107)
CO2 SERPL-SCNC: 22 MMOL/L (ref 22–29)
CO2 SERPL-SCNC: 24 MMOL/L (ref 22–29)
CREAT SERPL-MCNC: 0.72 MG/DL (ref 0.76–1.27)
CREAT SERPL-MCNC: 0.85 MG/DL (ref 0.76–1.27)
DEPRECATED RDW RBC AUTO: 44.1 FL (ref 37–54)
EGFRCR SERPLBLD CKD-EPI 2021: 105.2 ML/MIN/1.73
EGFRCR SERPLBLD CKD-EPI 2021: 110.6 ML/MIN/1.73
ERYTHROCYTE [DISTWIDTH] IN BLOOD BY AUTOMATED COUNT: 12.4 % (ref 12.3–15.4)
GLUCOSE SERPL-MCNC: 104 MG/DL (ref 65–99)
GLUCOSE SERPL-MCNC: 124 MG/DL (ref 65–99)
HCT VFR BLD AUTO: 42.9 % (ref 37.5–51)
HGB BLD-MCNC: 15.1 G/DL (ref 13–17.7)
MCH RBC QN AUTO: 33.9 PG (ref 26.6–33)
MCHC RBC AUTO-ENTMCNC: 35.2 G/DL (ref 31.5–35.7)
MCV RBC AUTO: 96.4 FL (ref 79–97)
OSMOLALITY SERPL: 286 MOSM/KG (ref 275–300)
OSMOLALITY SERPL: 288 MOSM/KG (ref 275–300)
OSMOLALITY SERPL: 289 MOSM/KG (ref 275–300)
OSMOLALITY SERPL: 296 MOSM/KG (ref 275–300)
PLATELET # BLD AUTO: 341 10*3/MM3 (ref 140–450)
PMV BLD AUTO: 8.7 FL (ref 6–12)
POTASSIUM SERPL-SCNC: 3.6 MMOL/L (ref 3.5–5.2)
POTASSIUM SERPL-SCNC: 3.7 MMOL/L (ref 3.5–5.2)
PS IGA SER-ACNC: 3 APS UNITS (ref 0–19)
PS IGG SER-ACNC: 9 UNITS (ref 0–30)
PS IGM SER-ACNC: <10 UNITS (ref 0–30)
RBC # BLD AUTO: 4.45 10*6/MM3 (ref 4.14–5.8)
SODIUM SERPL-SCNC: 138 MMOL/L (ref 136–145)
SODIUM SERPL-SCNC: 138 MMOL/L (ref 136–145)
SODIUM SERPL-SCNC: 139 MMOL/L (ref 136–145)
WBC NRBC COR # BLD AUTO: 11.11 10*3/MM3 (ref 3.4–10.8)

## 2024-04-13 PROCEDURE — 83930 ASSAY OF BLOOD OSMOLALITY: CPT | Performed by: STUDENT IN AN ORGANIZED HEALTH CARE EDUCATION/TRAINING PROGRAM

## 2024-04-13 PROCEDURE — 80048 BASIC METABOLIC PNL TOTAL CA: CPT | Performed by: STUDENT IN AN ORGANIZED HEALTH CARE EDUCATION/TRAINING PROGRAM

## 2024-04-13 PROCEDURE — 84295 ASSAY OF SERUM SODIUM: CPT | Performed by: STUDENT IN AN ORGANIZED HEALTH CARE EDUCATION/TRAINING PROGRAM

## 2024-04-13 PROCEDURE — 99233 SBSQ HOSP IP/OBS HIGH 50: CPT | Performed by: PHYSICIAN ASSISTANT

## 2024-04-13 PROCEDURE — 97530 THERAPEUTIC ACTIVITIES: CPT

## 2024-04-13 PROCEDURE — 85027 COMPLETE CBC AUTOMATED: CPT | Performed by: INTERNAL MEDICINE

## 2024-04-13 RX ORDER — POTASSIUM CHLORIDE 1.5 G/1.58G
40 POWDER, FOR SOLUTION ORAL EVERY 4 HOURS
Status: COMPLETED | OUTPATIENT
Start: 2024-04-13 | End: 2024-04-14

## 2024-04-13 RX ORDER — ACETAMINOPHEN 500 MG
500 TABLET ORAL EVERY 6 HOURS PRN
Status: DISCONTINUED | OUTPATIENT
Start: 2024-04-13 | End: 2024-04-17 | Stop reason: HOSPADM

## 2024-04-13 RX ORDER — CLOPIDOGREL BISULFATE 75 MG/1
75 TABLET ORAL DAILY
Status: DISCONTINUED | OUTPATIENT
Start: 2024-04-14 | End: 2024-04-17 | Stop reason: HOSPADM

## 2024-04-13 RX ORDER — ASPIRIN 81 MG/1
81 TABLET, CHEWABLE ORAL DAILY
Status: DISCONTINUED | OUTPATIENT
Start: 2024-04-14 | End: 2024-04-17 | Stop reason: HOSPADM

## 2024-04-13 RX ADMIN — DOCUSATE SODIUM 50MG AND SENNOSIDES 8.6MG 2 TABLET: 8.6; 5 TABLET, FILM COATED ORAL at 09:40

## 2024-04-13 RX ADMIN — ACETAMINOPHEN 500 MG: 500 TABLET ORAL at 14:27

## 2024-04-13 RX ADMIN — Medication 1 TABLET: at 09:03

## 2024-04-13 RX ADMIN — ASPIRIN 325 MG: 325 TABLET ORAL at 09:03

## 2024-04-13 RX ADMIN — ATORVASTATIN CALCIUM 80 MG: 80 TABLET, FILM COATED ORAL at 20:00

## 2024-04-13 RX ADMIN — AMLODIPINE BESYLATE 10 MG: 10 TABLET ORAL at 09:03

## 2024-04-13 RX ADMIN — POTASSIUM CHLORIDE 40 MEQ: 1.5 POWDER, FOR SOLUTION ORAL at 21:47

## 2024-04-13 RX ADMIN — Medication 100 MG: at 09:03

## 2024-04-13 RX ADMIN — FOLIC ACID 1 MG: 1 TABLET ORAL at 09:03

## 2024-04-13 NOTE — PROGRESS NOTES
Name: Stephane Jaimes ADMIT: 2024   : 1970  PCP: System, Provider Not In    MRN: [id number] LOS: 3 days   AGE/SEX: 53 y.o. male  ROOM: formerly [facility]     Subjective   Subjective   Endorsing worsening headache     Objective   Objective     Vital Signs  Temp:  [97.3 °F (36.3 °C)-98.1 °F (36.7 °C)] 98.1 °F (36.7 °C)  Heart Rate:  [79-88] 79  Resp:  [16-18] 18  BP: (131-166)/(68-91) 137/82  SpO2:  [97 %-100 %] 97 %  on   ;   Device (Oxygen Therapy): room air  Body mass index is 30.73 kg/m².    Physical Exam  General: Sleeping but arouses easily.  No distress.  ENT: No conjunctival injection or scleral icterus. Moist mucous membranes.   Neuro: Eyes open and moving in all directions, strength normal in all extremities, no focal deficits.   Lungs: Clear to auscultation bilaterally. No wheeze or crackles. No distress.   Heart: RRR, no murmurs. No edema.  Abdomen: Obese.  Soft, non-tender, non-distended. Normal bowel sounds.   Ext: Warm and well-perfused. No edema.   Skin: No rashes or lesions. IV site without swelling or erythema.    Results Review     I reviewed the patient's new clinical results:  Results from last 7 days   Lab Units 24  0457 24  0557 24  0521 04/10/24  0557   WBC 10*3/mm3 11.11* 10.21 9.78 11.50*   HEMOGLOBIN g/dL 15.1 14.5 14.5 14.6   PLATELETS 10*3/mm3 341 318 277 279     Results from last 7 days   Lab Units 24  1147 24  0457 24  2355 24  1949 24  1811 24  1424 24  0557   SODIUM mmol/L 139 139 139 140   < > 141  141 139   POTASSIUM mmol/L  --  3.7  --  3.8  --  3.8 4.4  4.4   CHLORIDE mmol/L  --  106  --  108*  --  109* 111*   CO2 mmol/L  --  22.0  --  21.2*  --  21.0* 17.7*   BUN mg/dL  --  16  --  16  --  16 12   CREATININE mg/dL  --  0.72*  --  0.82  --  0.79 0.74*   GLUCOSE mg/dL  --  104*  --  132*  --  130* 118*   EGFR mL/min/1.73  --  110.6  --  106.4  --  107.6 109.7    < > = values in this interval not displayed.     Results from  last 7 days   Lab Units 04/09/24  0302 04/06/24  2133   ALBUMIN g/dL 4.0 4.2   BILIRUBIN mg/dL 0.5 <0.2   ALK PHOS U/L 106 123*   AST (SGOT) U/L 28 42*   ALT (SGPT) U/L 32 47*     Results from last 7 days   Lab Units 04/13/24  0457 04/12/24  1949 04/12/24  1424 04/12/24  0557 04/09/24  1823 04/09/24  0302 04/08/24  0227 04/06/24  2133   CALCIUM mg/dL 9.1 9.1 9.3 8.7   < > 8.5*   < > 8.1*   ALBUMIN g/dL  --   --   --   --   --  4.0  --  4.2   MAGNESIUM mg/dL  --   --   --   --   --  2.1  --   --    PHOSPHORUS mg/dL  --   --   --   --   --  2.4*  --   --     < > = values in this interval not displayed.     Results from last 7 days   Lab Units 04/08/24  0227 04/07/24 2016 04/07/24  1421 04/07/24  1114 04/07/24  0510 04/06/24  2133   PROCALCITONIN ng/mL  --   --   --   --   --  0.06   LACTATE mmol/L 2.2* 3.2* 2.3* 3.0*   < >  --     < > = values in this interval not displayed.     Hemoglobin A1C   Date/Time Value Ref Range Status   04/11/2024 0521 6.00 (H) 4.80 - 5.60 % Final     Glucose   Date/Time Value Ref Range Status   04/11/2024 2224 119 70 - 130 mg/dL Final       CT Head Without Contrast    Result Date: 4/12/2024  Electronically signed by Diana Ordoñez MD on 04-12-24 at 0712     I have personally reviewed all medications:  Scheduled Medications  amLODIPine, 10 mg, Oral, Q24H  [START ON 4/14/2024] aspirin, 81 mg, Oral, Daily  atorvastatin, 80 mg, Oral, Nightly  [START ON 4/14/2024] clopidogrel, 75 mg, Oral, Daily  folic acid, 1 mg, Oral, Daily  multivitamin with minerals, 1 tablet, Oral, Daily  thiamine, 100 mg, Oral, Daily    Infusions   Diet  Diet: Regular/House; Texture: Pureed (NDD 1); Fluid Consistency: Honey Thick    Assessment/Plan     Active Hospital Problems    Diagnosis  POA    **Alcohol intoxication [F10.929]  Yes    Cerebellar stroke [I63.9]  Unknown    Cerebral edema [G93.6]  Unknown    HTN (hypertension) [I10]  Unknown    Transaminitis [R74.01]  Unknown      Resolved Hospital Problems   No resolved  problems to display.       53 y.o. male with Alcohol intoxication.    Acute left cerebellar ischemic stroke with brain compression  Dysphagia  -MRI and MRA revealing left dick and left cerebellar infarct  -Echocardiogram with EF 70.8%, normal diastolic function  -Neurology and neurosurgery consulted, appreciate their assistance  -Initial imaging for healed obstructive hydrocephalus involving the lateral and third ventricles  -Patient had been in ICU for hypertonic saline and q2 hour neurochecks  -Repeat head CT stable  -Working with SLP after stroke.  Current recommendation is for purée and honey thick liquids.   -PT/OT recommending acute inpatient rehab they will continue to evaluate.  -APAP for headache    Anion gap metabolic acidosis  -resolved    Alcohol use disorder with concern for withdrawal  -Continue CIWA protocol, Ativan as needed for withdrawal  -Thiamine and folate  -Access center consult    HTN  -Blood pressure acceptable at this time  -Continue amlodipine      SCDs for DVT prophylaxis.  Full code.  Discussed with patient and nursing staff.  Anticipate discharge TBKENNETH Godinez MD  [facility]  04/13/24  15:54 EDT

## 2024-04-13 NOTE — PROGRESS NOTES
"DOS: 2024  NAME: Stephane Jaimes   : 1970  PCP: System, Provider Not In  Chief Complaint   Patient presents with    Alcohol Intoxication       Chief complaint: Dizziness  Subjective: Patient seen this morning using tablet .  He complains of headache 6 out of 10.  It is bit worse than it has been during his stay.  He denies new vision change, weakness, numbness or any other symptom.  He does endorse palpitations while at home    Objective:  Vital signs: /91 (BP Location: Left arm, Patient Position: Lying)   Pulse 81   Temp 97.9 °F (36.6 °C) (Oral)   Resp 16   Ht 170.2 cm (67\")   Wt 89 kg (196 lb 3.4 oz)   SpO2 100%   BMI 30.73 kg/m²      Gen: NAD, vitals reviewed  MS: oriented x3, recent/remote memory intact, normal attention/concentration, language intact, no neglect.  CN: visual acuity grossly normal, PERRL, horizontal nystagmus on right and left gaze, no disconjugate gaze, EOMI, no facial droop, no dysarthria  Motor: No pronator drift   Coordination: Dysmetria finger-nose on the left  sensory: intact to light touch all 4 ext.    ROS:  No weakness, numbness  No fevers, chills      Laboratory results:  Lab Results   Component Value Date    GLUCOSE 104 (H) 2024    CALCIUM 9.1 2024     2024    K 3.7 2024    CO2 22.0 2024     2024    BUN 16 2024    CREATININE 0.72 (L) 2024    BCR 22.2 2024    ANIONGAP 11.0 2024     Lab Results   Component Value Date    WBC 11.11 (H) 2024    HGB 15.1 2024    HCT 42.9 2024    MCV 96.4 2024     2024     Lab Results   Component Value Date     (H) 2024         Lab 24  0521   HEMOGLOBIN A1C 6.00*        Review of labs:     MRI/A  IMPRESSION:     1. Redemonstration of the large subacute infarct involving the majority  of the left cerebellar hemisphere and the cerebellar vermis with  associated mass effect on the dick and basilar " cisterns and mild  hydronephrosis.  2. Redemonstration of no flow in the left superior cerebellar artery.  3. Similar-appearing lack of flow in the proximal and mid right  vertebral artery with minimal flow in the proximal right V4 segment and  no flow seen distally in the right V4 segment.  4. Small foci of restricted diffusion and high T2/FLAIR signal in the  dick, also likely subacute infarcts.  5. Scattered periventricular and subcortical white matter high T2/FLAIR  signal, statistically representing chronic small vessel ischemic  changes.  6. Normal variant left persistent posterior fetal circulation.     Review and interpretation of imaging:     Workup to date:  TTE    Left ventricular systolic function is hyperdynamic (EF > 70%). Calculated left ventricular EF = 70.8%    Left ventricular wall thickness is consistent with mild concentric hypertrophy.    Left ventricular diastolic function was normal.  Diagnoses:  1 large left cerebellar stroke  2 EtOH use  3 hypertension is ER  4 concern for aspiration pneumonia    Impression: 53-year-old  gentleman was found unresponsive in his car by EMS on 4/7.  He was given Narcan with no improvement.  On neuro eval he had persistent right gaze preference and left-sided neglect with weakness. HCT showed hypodensity of L cerebellum and not intervention candidate.  He was monitored in ICU and on hypertonic fluids.    Neurosurgery consulted.  BP improved, required antihypertensive gtts.  His exam is stable and he has moved out of ICU.  Today he is c/o worsened headache, but no new neuro sxs.  HCT yesterday stable    CTA reviewed with stroke attending and no significant stenosis, less concern for occlusion of L vertebral artery.  TTE negative for cardiac source.  KYLE thought contraindicated for concern for inc of ICP in setting of large stroke.  He will need zio.  He endorses palpitations.  Strongly suspicious for cardiac source.  Will initiate on DAPT with plan for  repeat CTA in 2 mos.     Plan:  1 apap for headache now  2 ASA 81 and plavis 75 until repeat vascular studies outpt  3 cont on high intensity statin  4 zio at d/c, consider KYLE and/or loop outpt  5 cont to monitor bp, try to goal 130 systolic        Thank you for this consultation.  Discussed above plan with neuro attending, Dr. Quinones who agrees with above plan.  Neurology team is available for concerns or questions.

## 2024-04-13 NOTE — THERAPY TREATMENT NOTE
Patient Name: Stephane Jaimes  : 1970    MRN: [id number]                              Today's Date: 2024       Admit Date: 2024    Visit Dx:     ICD-10-CM ICD-9-CM   1. Alcoholic intoxication without complication  F10.920 305.00   2. Transaminitis  R74.01 790.4   3. High anion gap metabolic acidosis  E87.29 276.2     Patient Active Problem List   Diagnosis    Alcohol intoxication    Transaminitis    HTN (hypertension)    Cerebellar stroke    Cerebral edema     History reviewed. No pertinent past medical history.  History reviewed. No pertinent surgical history.   General Information       Row Name 24          Physical Therapy Time and Intention    Document Type therapy note (daily note)  -EM     Mode of Treatment individual therapy;physical therapy  -EM       Row Name 24          General Information    Existing Precautions/Restrictions fall  -EM     Barriers to Rehab language barrier  -EM               User Key  (r) = Recorded By, (t) = Taken By, (c) = Cosigned By      Initials Name Provider Type    EM Carolyn Schofield PT Physical Therapist                   Mobility       Row Name 24          Bed Mobility    Supine-Sit [place] (Bed Mobility) minimum assist (75% patient effort)  -EM     Sit-Supine [place] (Bed Mobility) contact guard  -EM     Assistive Device (Bed Mobility) head of bed elevated  -EM       Row Name 24          Sit-Stand Transfer    Sit-Stand [place] (Transfers) moderate assist (50% patient effort);2 person assist  -EM     Comment, (Sit-Stand Transfer) 2 standing trials at the bedside, pt knees buckle with initial standing attempt both times, decreased coordination with sit to stand transfers  -EM       Row Name 24          Gait/Stairs (Locomotion)    Comment, (Gait/Stairs) sidestepping at EOB with mod/maxAx2, difficulty with weight shifting and moving feet, very ataxic  -EM               User Key  (r) = Recorded By,  "(t) = Taken By, (c) = Cosigned By      Initials Name Provider Type    Carolyn Armendariz PT Physical Therapist                   Obj/Interventions       Row Name 04/13/24 1647          Motor Skills    Therapeutic Exercise other (see comments)  in sitting: AP and LAQ, cues for slow controlled movement  -EM       Row Name 04/13/24 1651          Balance    Comment, Balance in standing, weight shifting R and L and ant/post with modAx2, pt seems to have increased ataxic movements on L side  -EM               User Key  (r) = Recorded By, (t) = Taken By, (c) = Cosigned By      Initials Name Provider Type    Carolyn Armendariz PT Physical Therapist                   Goals/Plan    No documentation.                  Clinical Impression       Row Name 04/13/24 1648          Pain    Pretreatment Pain Rating 0/10 - no pain  -EM     Pre/Posttreatment Pain Comment pt states his head hurts \"a little\" but improved from yesterday, did not rate on numeric scale when questioned, says \"it's good\"  -EM     Pain Intervention(s) Repositioned  -EM       Row Name 04/13/24 1643          Plan of Care Review    Plan of Care Reviewed With patient  -EM     Outcome Evaluation pt supine in bed, sleeping but easily awakened, ipad  used throughout tx session ( #[id number]). Patient has decreased c/o pain in eyes and head today, performed bed mobility with Tanner, sit to stand with modAx2, working on weight shifting to R and L and ant/post, attempted side stepping, pt very ataxic and demonstrates great difficulty moving feet. Recommend rehab at d/c  -EM       Row Name 04/13/24 6789          Positioning and Restraints    Pre-Treatment Position in bed  -EM     Post Treatment Position bed  -EM     In Bed supine;call light within reach;exit alarm on  -EM               User Key  (r) = Recorded By, (t) = Taken By, (c) = Cosigned By      Initials Name Provider Type    Carolyn Armendariz PT Physical Therapist              "      Outcome Measures       Row Name 04/13/24 1652          How much help from another person do you currently need...    Turning from your back to your side while in flat bed without using bedrails? 3  -EM     Moving from lying on back to sitting on the side of a flat bed without bedrails? 3  -EM     Moving to and from a bed to a chair (including a wheelchair)? 2  -EM     Standing up from a chair using your arms (e.g., wheelchair, bedside chair)? 2  -EM     Climbing 3-5 steps with a railing? 1  -EM     To walk in hospital room? 1  -EM     AM-PAC 6 Clicks Score (PT) 12  -EM     Highest Level of Mobility Goal 4 --> Transfer to chair/commode  -EM               User Key  (r) = Recorded By, (t) = Taken By, (c) = Cosigned By      Initials Name Provider Type     Carolyn Schofield PT Physical Therapist                                 Physical Therapy Education       Title: PT OT SLP Therapies (In Progress)       Topic: Physical Therapy (Done)       Point: Mobility training (Done)       Learning Progress Summary             Patient Acceptance, E, VU by SARWAT at 4/13/2024 1653    Acceptance, E, VU by CORNELIO at 4/12/2024 1326    Acceptance, E,I, NR by  at 4/11/2024 1033                         Point: Home exercise program (Done)       Learning Progress Summary             Patient Acceptance, E, VU by CORNELIO at 4/12/2024 1326                         Point: Body mechanics (Done)       Learning Progress Summary             Patient Acceptance, E, VU by CORNELIO at 4/12/2024 1326    Acceptance, E,I, NR by  at 4/11/2024 1033                         Point: Precautions (Done)       Learning Progress Summary             Patient Acceptance, E, VU by CORNELIO at 4/12/2024 1326    Acceptance, E,I, NR by  at 4/11/2024 1033                                         User Key       Initials Effective Dates Name Provider Type Discipline     06/16/21 -  Carolyn Schofield PT Physical Therapist PT    MOI 10/25/19 -  Amira Gonzalez PT Physical Therapist  PT    CHIG 02/29/24 -  Reynaldo Lindsey, PT Student PT Student PT                  PT Recommendation and Plan     Plan of Care Reviewed With: patient  Outcome Evaluation: pt supine in bed, sleeping but easily awakened, ipad  used throughout tx session ( #[id number]). Patient has decreased c/o pain in eyes and head today, performed bed mobility with Tanner, sit to stand with modAx2, working on weight shifting to R and L and ant/post, attempted side stepping, pt very ataxic and demonstrates great difficulty moving feet. Recommend rehab at d/c     Time Calculation:         PT Charges       Row Name 04/13/24 1654             Time Calculation    Start Time 1544  -EM      Stop Time 1603  -EM      Time Calculation (min) 19 min  -EM      PT Received On 04/13/24  -EM      PT - Next Appointment 04/15/24  -EM         Time Calculation- PT    Total Timed Code Minutes- PT 19 minute(s)  -EM         Timed Charges    89335 - PT Therapeutic Activity Minutes 19  -EM         Total Minutes    Timed Charges Total Minutes 19  -EM       Total Minutes 19  -EM                User Key  (r) = Recorded By, (t) = Taken By, (c) = Cosigned By      Initials Name Provider Type    EM Carolyn Schofield PT Physical Therapist                  Therapy Charges for Today       Code Description Service Date Service Provider Modifiers Qty    22385274183 HC PT THERAPEUTIC ACT EA 15 MIN 4/13/2024 Carolyn Schofield, PT GP 1    76058045567 HC PT THER SUPP EA 15 MIN 4/13/2024 Carolyn Schofield, PT GP 1            PT G-Codes  Outcome Measure Options: AM-PAC 6 Clicks Basic Mobility (PT)  AM-PAC 6 Clicks Score (PT): 12  AM-PAC 6 Clicks Score (OT): 13  Modified Starke Scale: 4 - Moderately severe disability.  Unable to walk without assistance, and unable to attend to own bodily needs without assistance.       Carolyn Schofield PT  4/13/2024

## 2024-04-13 NOTE — PROGRESS NOTES
[facility] follow up d/t EtOH; this writer reviewed chart and spoke with RN Yu. Per RN, patient is doing fine; he slept all night.  Last CIWA 2 at 20:15; [facility]ILLIE counselor provided follow-up and MILLIE resources. No further needs/concerns noted at this time per RN and/or medical team; [facility] to continue following.

## 2024-04-13 NOTE — PLAN OF CARE
Goal Outcome Evaluation:  Plan of Care Reviewed With: patient           Outcome Evaluation: pt supine in bed, sleeping but easily awakened, ipad  used throughout tx session ( #[id number]). Patient has decreased c/o pain in eyes and head today, performed bed mobility with Tanner, sit to stand with modAx2, working on weight shifting to R and L and ant/post, attempted side stepping, pt very ataxic and demonstrates great difficulty moving feet. Recommend rehab at d/c

## 2024-04-13 NOTE — PLAN OF CARE
Problem: Adult Inpatient Plan of Care  Goal: Plan of Care Review  Outcome: Ongoing, Progressing  Flowsheets (Taken 4/13/2024 0317)  Plan of Care Reviewed With: patient  Goal: Patient-Specific Goal (Individualized)  Outcome: Ongoing, Progressing  Goal: Absence of Hospital-Acquired Illness or Injury  Outcome: Ongoing, Progressing  Intervention: Identify and Manage Fall Risk  Recent Flowsheet Documentation  Taken 4/13/2024 0200 by Yu Garcia RN  Safety Promotion/Fall Prevention:   activity supervised   assistive device/personal items within reach   clutter free environment maintained   safety round/check completed  Taken 4/13/2024 0000 by Yu Garcia RN  Safety Promotion/Fall Prevention:   activity supervised   assistive device/personal items within reach   clutter free environment maintained   safety round/check completed  Taken 4/12/2024 2243 by Yu Garcia RN  Safety Promotion/Fall Prevention:   activity supervised   assistive device/personal items within reach   clutter free environment maintained   safety round/check completed  Taken 4/12/2024 2146 by Yu Garcia RN  Safety Promotion/Fall Prevention:   activity supervised   assistive device/personal items within reach   clutter free environment maintained   safety round/check completed  Taken 4/12/2024 2015 by Yu Garcia RN  Safety Promotion/Fall Prevention: safety round/check completed  Intervention: Prevent Skin Injury  Recent Flowsheet Documentation  Taken 4/13/2024 0200 by Yu Garcia RN  Body Position: position changed independently  Taken 4/13/2024 0000 by Yu Garcia RN  Body Position: position changed independently  Taken 4/12/2024 2243 by Yu Garcia RN  Body Position: position changed independently  Taken 4/12/2024 2146 by Yu Garcia RN  Body Position: position changed independently  Taken 4/12/2024 2015 by Yu Garcia RN  Body Position: position changed independently  Skin Protection: adhesive use  limited  Intervention: Prevent and Manage VTE (Venous Thromboembolism) Risk  Recent Flowsheet Documentation  Taken 4/12/2024 2015 by Yu Garcia RN  VTE Prevention/Management:   bilateral   sequential compression devices on  Range of Motion: ROM (range of motion) performed  Intervention: Prevent Infection  Recent Flowsheet Documentation  Taken 4/12/2024 2015 by Yu Garcia RN  Infection Prevention: single patient room provided  Goal: Optimal Comfort and Wellbeing  Outcome: Ongoing, Progressing  Intervention: Provide Person-Centered Care  Recent Flowsheet Documentation  Taken 4/12/2024 2015 by Yu Garcia RN  Trust Relationship/Rapport:   choices provided   care explained  Goal: Readiness for Transition of Care  Outcome: Ongoing, Progressing     Problem: Fall Injury Risk  Goal: Absence of Fall and Fall-Related Injury  Outcome: Ongoing, Progressing  Intervention: Identify and Manage Contributors  Recent Flowsheet Documentation  Taken 4/13/2024 0200 by Yu Garcia RN  Medication Review/Management: medications reviewed  Taken 4/13/2024 0000 by Yu Garcia RN  Medication Review/Management: medications reviewed  Taken 4/12/2024 2243 by Yu Garcia RN  Medication Review/Management: medications reviewed  Taken 4/12/2024 2146 by Yu Garcia RN  Medication Review/Management: medications reviewed  Taken 4/12/2024 2015 by Yu Garcia RN  Medication Review/Management: medications reviewed  Intervention: Promote Injury-Free Environment  Recent Flowsheet Documentation  Taken 4/13/2024 0200 by Yu Garcia RN  Safety Promotion/Fall Prevention:   activity supervised   assistive device/personal items within reach   clutter free environment maintained   safety round/check completed  Taken 4/13/2024 0000 by Yu Garcia RN  Safety Promotion/Fall Prevention:   activity supervised   assistive device/personal items within reach   clutter free environment maintained   safety round/check  completed  Taken 4/12/2024 2243 by Yu Garcia RN  Safety Promotion/Fall Prevention:   activity supervised   assistive device/personal items within reach   clutter free environment maintained   safety round/check completed  Taken 4/12/2024 2146 by Yu Garcia RN  Safety Promotion/Fall Prevention:   activity supervised   assistive device/personal items within reach   clutter free environment maintained   safety round/check completed  Taken 4/12/2024 2015 by Yu Garcia RN  Safety Promotion/Fall Prevention: safety round/check completed     Problem: Suicide Risk  Goal: Absence of Self-Harm  Outcome: Ongoing, Progressing  Intervention: Promote Psychosocial Wellbeing  Recent Flowsheet Documentation  Taken 4/12/2024 2015 by Yu Garcia RN  Supportive Measures: active listening utilized  Family/Support System Care: self-care encouraged  Sleep/Rest Enhancement: awakenings minimized     Problem: Infection (Pneumonia)  Goal: Resolution of Infection Signs and Symptoms  Outcome: Ongoing, Progressing     Problem: Alcohol Withdrawal  Goal: Alcohol Withdrawal Symptom Control  Outcome: Ongoing, Progressing  Intervention: Minimize or Manage Alcohol Withdrawal Symptoms  Recent Flowsheet Documentation  Taken 4/12/2024 2015 by Yu Garcia RN  Sensory Stimulation Regulation: care clustered     Problem: Substance Misuse (Alcohol Withdrawal)  Goal: Readiness for Change Identified  Outcome: Ongoing, Progressing  Intervention: Partner to Facilitate Behavior Change  Recent Flowsheet Documentation  Taken 4/12/2024 2015 by Yu Garcia RN  Supportive Measures: active listening utilized  Intervention: Promote Psychosocial Wellbeing  Recent Flowsheet Documentation  Taken 4/12/2024 2015 by Yu Garcia RN  Family/Support System Care: self-care encouraged     Problem: Skin Injury Risk Increased  Goal: Skin Health and Integrity  Outcome: Ongoing, Progressing  Intervention: Optimize Skin Protection  Recent Flowsheet  Documentation  Taken 4/13/2024 0200 by Yu Garcia RN  Head of Bed (HOB) Positioning: HOB at 30 degrees  Taken 4/13/2024 0000 by Yu Garcia RN  Head of Bed (HOB) Positioning: HOB at 30 degrees  Taken 4/12/2024 2243 by Yu Garcia RN  Head of Bed (HOB) Positioning: HOB at 30 degrees  Taken 4/12/2024 2146 by Yu Garcia RN  Head of Bed (HOB) Positioning: HOB at 30 degrees  Taken 4/12/2024 2015 by Yu Garcia RN  Pressure Reduction Techniques: frequent weight shift encouraged  Head of Bed (HOB) Positioning: HOB at 20 degrees  Pressure Reduction Devices: positioning supports utilized  Skin Protection: adhesive use limited   Goal Outcome Evaluation:  Plan of Care Reviewed With: patient

## 2024-04-14 LAB
ANION GAP SERPL CALCULATED.3IONS-SCNC: 10 MMOL/L (ref 5–15)
ANION GAP SERPL CALCULATED.3IONS-SCNC: 8.8 MMOL/L (ref 5–15)
BUN SERPL-MCNC: 16 MG/DL (ref 6–20)
BUN SERPL-MCNC: 16 MG/DL (ref 6–20)
BUN/CREAT SERPL: 18.8 (ref 7–25)
BUN/CREAT SERPL: 21.9 (ref 7–25)
CALCIUM SPEC-SCNC: 8.8 MG/DL (ref 8.6–10.5)
CALCIUM SPEC-SCNC: 8.9 MG/DL (ref 8.6–10.5)
CHLORIDE SERPL-SCNC: 105 MMOL/L (ref 98–107)
CHLORIDE SERPL-SCNC: 105 MMOL/L (ref 98–107)
CO2 SERPL-SCNC: 24 MMOL/L (ref 22–29)
CO2 SERPL-SCNC: 25.2 MMOL/L (ref 22–29)
CREAT SERPL-MCNC: 0.73 MG/DL (ref 0.76–1.27)
CREAT SERPL-MCNC: 0.85 MG/DL (ref 0.76–1.27)
DEPRECATED RDW RBC AUTO: 42.6 FL (ref 37–54)
EGFRCR SERPLBLD CKD-EPI 2021: 105.2 ML/MIN/1.73
EGFRCR SERPLBLD CKD-EPI 2021: 110.2 ML/MIN/1.73
ERYTHROCYTE [DISTWIDTH] IN BLOOD BY AUTOMATED COUNT: 12.3 % (ref 12.3–15.4)
GLUCOSE SERPL-MCNC: 113 MG/DL (ref 65–99)
GLUCOSE SERPL-MCNC: 99 MG/DL (ref 65–99)
HCT VFR BLD AUTO: 41.5 % (ref 37.5–51)
HGB BLD-MCNC: 14.4 G/DL (ref 13–17.7)
MCH RBC QN AUTO: 33.5 PG (ref 26.6–33)
MCHC RBC AUTO-ENTMCNC: 34.7 G/DL (ref 31.5–35.7)
MCV RBC AUTO: 96.5 FL (ref 79–97)
OSMOLALITY SERPL: 284 MOSM/KG (ref 275–300)
OSMOLALITY SERPL: 288 MOSM/KG (ref 275–300)
OSMOLALITY SERPL: 289 MOSM/KG (ref 275–300)
OSMOLALITY SERPL: 294 MOSM/KG (ref 275–300)
PLATELET # BLD AUTO: 335 10*3/MM3 (ref 140–450)
PMV BLD AUTO: 8.6 FL (ref 6–12)
POTASSIUM SERPL-SCNC: 3.9 MMOL/L (ref 3.5–5.2)
POTASSIUM SERPL-SCNC: 4 MMOL/L (ref 3.5–5.2)
RBC # BLD AUTO: 4.3 10*6/MM3 (ref 4.14–5.8)
SODIUM SERPL-SCNC: 137 MMOL/L (ref 136–145)
SODIUM SERPL-SCNC: 138 MMOL/L (ref 136–145)
SODIUM SERPL-SCNC: 139 MMOL/L (ref 136–145)
WBC NRBC COR # BLD AUTO: 10.39 10*3/MM3 (ref 3.4–10.8)

## 2024-04-14 PROCEDURE — 83930 ASSAY OF BLOOD OSMOLALITY: CPT | Performed by: STUDENT IN AN ORGANIZED HEALTH CARE EDUCATION/TRAINING PROGRAM

## 2024-04-14 PROCEDURE — 80048 BASIC METABOLIC PNL TOTAL CA: CPT | Performed by: STUDENT IN AN ORGANIZED HEALTH CARE EDUCATION/TRAINING PROGRAM

## 2024-04-14 PROCEDURE — 85027 COMPLETE CBC AUTOMATED: CPT | Performed by: INTERNAL MEDICINE

## 2024-04-14 PROCEDURE — 84295 ASSAY OF SERUM SODIUM: CPT | Performed by: STUDENT IN AN ORGANIZED HEALTH CARE EDUCATION/TRAINING PROGRAM

## 2024-04-14 PROCEDURE — 99233 SBSQ HOSP IP/OBS HIGH 50: CPT | Performed by: PHYSICIAN ASSISTANT

## 2024-04-14 RX ADMIN — FOLIC ACID 1 MG: 1 TABLET ORAL at 08:49

## 2024-04-14 RX ADMIN — DOCUSATE SODIUM 50MG AND SENNOSIDES 8.6MG 2 TABLET: 8.6; 5 TABLET, FILM COATED ORAL at 10:45

## 2024-04-14 RX ADMIN — POTASSIUM CHLORIDE 40 MEQ: 1.5 POWDER, FOR SOLUTION ORAL at 01:55

## 2024-04-14 RX ADMIN — Medication 1 TABLET: at 08:50

## 2024-04-14 RX ADMIN — CLOPIDOGREL BISULFATE 75 MG: 75 TABLET, FILM COATED ORAL at 08:49

## 2024-04-14 RX ADMIN — Medication 100 MG: at 08:50

## 2024-04-14 RX ADMIN — ATORVASTATIN CALCIUM 80 MG: 80 TABLET, FILM COATED ORAL at 21:44

## 2024-04-14 RX ADMIN — ASPIRIN 81 MG: 81 TABLET, CHEWABLE ORAL at 08:50

## 2024-04-14 RX ADMIN — AMLODIPINE BESYLATE 10 MG: 10 TABLET ORAL at 08:49

## 2024-04-14 NOTE — PLAN OF CARE
Problem: Adult Inpatient Plan of Care  Goal: Plan of Care Review  Outcome: Ongoing, Progressing     Problem: Adult Inpatient Plan of Care  Goal: Absence of Hospital-Acquired Illness or Injury  Outcome: Ongoing, Progressing  Intervention: Identify and Manage Fall Risk  Recent Flowsheet Documentation  Taken 4/14/2024 1600 by Dee Reed RN  Safety Promotion/Fall Prevention: safety round/check completed  Taken 4/14/2024 1400 by Dee Reed RN  Safety Promotion/Fall Prevention: safety round/check completed  Taken 4/14/2024 1200 by Dee Reed RN  Safety Promotion/Fall Prevention: safety round/check completed  Taken 4/14/2024 1000 by Dee Reed RN  Safety Promotion/Fall Prevention: safety round/check completed  Taken 4/14/2024 0800 by Dee Reed RN  Safety Promotion/Fall Prevention: safety round/check completed  Intervention: Prevent Skin Injury  Recent Flowsheet Documentation  Taken 4/14/2024 1600 by Dee Reed RN  Body Position: position changed independently  Taken 4/14/2024 1400 by Dee Reed RN  Body Position: position changed independently  Taken 4/14/2024 1200 by Dee Reed RN  Body Position: position changed independently  Taken 4/14/2024 1000 by Dee Reed RN  Body Position: position changed independently  Taken 4/14/2024 0800 by Dee Reed RN  Body Position: position changed independently  Skin Protection: adhesive use limited  Intervention: Prevent and Manage VTE (Venous Thromboembolism) Risk  Recent Flowsheet Documentation  Taken 4/14/2024 1600 by Dee Reed RN  Activity Management: activity encouraged  Taken 4/14/2024 1400 by Dee Reed RN  Activity Management: activity encouraged  Taken 4/14/2024 1200 by Dee Reed RN  Activity Management: activity encouraged  Taken 4/14/2024 1000 by Dee Reed RN  Activity Management: activity encouraged  Taken 4/14/2024 0800 by Dee Reed RN  Activity Management: activity encouraged  VTE Prevention/Management: patient refused  intervention  Range of Motion: active ROM (range of motion) encouraged  Intervention: Prevent Infection  Recent Flowsheet Documentation  Taken 4/14/2024 1600 by Dee Reed RN  Infection Prevention:   single patient room provided   hand hygiene promoted  Taken 4/14/2024 1400 by Dee Reed RN  Infection Prevention:   single patient room provided   hand hygiene promoted  Taken 4/14/2024 1200 by Dee Reed RN  Infection Prevention:   single patient room provided   hand hygiene promoted  Taken 4/14/2024 1000 by Dee Reed RN  Infection Prevention:   single patient room provided   hand hygiene promoted  Taken 4/14/2024 0800 by Dee Reed RN  Infection Prevention:   single patient room provided   hand hygiene promoted     Problem: Adult Inpatient Plan of Care  Goal: Absence of Hospital-Acquired Illness or Injury  Intervention: Prevent Skin Injury  Recent Flowsheet Documentation  Taken 4/14/2024 1600 by Dee Reed RN  Body Position: position changed independently  Taken 4/14/2024 1400 by Dee Reed RN  Body Position: position changed independently  Taken 4/14/2024 1200 by Dee Reed RN  Body Position: position changed independently  Taken 4/14/2024 1000 by Dee Reed RN  Body Position: position changed independently  Taken 4/14/2024 0800 by Dee Reed RN  Body Position: position changed independently  Skin Protection: adhesive use limited     Problem: Adult Inpatient Plan of Care  Goal: Absence of Hospital-Acquired Illness or Injury  Intervention: Prevent and Manage VTE (Venous Thromboembolism) Risk  Recent Flowsheet Documentation  Taken 4/14/2024 1600 by Dee Reed RN  Activity Management: activity encouraged  Taken 4/14/2024 1400 by Dee Reed RN  Activity Management: activity encouraged  Taken 4/14/2024 1200 by Dee Reed RN  Activity Management: activity encouraged  Taken 4/14/2024 1000 by Dee Reed RN  Activity Management: activity encouraged  Taken 4/14/2024 0800 by Dee Reed  RN  Activity Management: activity encouraged  VTE Prevention/Management: patient refused intervention  Range of Motion: active ROM (range of motion) encouraged   Goal Outcome Evaluation:

## 2024-04-14 NOTE — PROGRESS NOTES
Access did follow up with pt. Pt was sleeping and struggled to respond to questions. Access gave pt resource for [facility] that have services for [place] speaking population that includes mental health services. Access will continue to follow.

## 2024-04-14 NOTE — PLAN OF CARE
Goal Outcome Evaluation:  Plan of Care Reviewed With: patient        Progress: no change  Outcome Evaluation: No new neuro deficits noted. Postassium replaed per order. Denies headache this shift. Voiding per urinal

## 2024-04-14 NOTE — PROGRESS NOTES
Name: Stephane Jaimes ADMIT: 2024   : 1970  PCP: System, Provider Not In    MRN: [id number] LOS: 4 days   AGE/SEX: 53 y.o. male  ROOM: [facility]     Subjective   Subjective   No new complaints todasy    Objective   Objective     Vital Signs  Temp:  [97.7 °F (36.5 °C)-98.2 °F (36.8 °C)] 98.1 °F (36.7 °C)  Heart Rate:  [75-86] 86  Resp:  [16-18] 18  BP: (131-146)/(70-90) 139/82  SpO2:  [98 %-100 %] 98 %  on   ;   Device (Oxygen Therapy): room air  Body mass index is 30.73 kg/m².    Physical Exam  General:  No distress.  Lungs: Clear to auscultation bilaterally. No wheeze or crackles. No distress.   Heart: RRR, no murmurs. No edema.  Abdomen: Obese.  Soft, non-tender, non-distended. Normal bowel sounds.   Neuro: Oriented x 3    Results Review     I reviewed the patient's new clinical results:  Results from last 7 days   Lab Units 24  0749 24  0457 24  0557 24  0521   WBC 10*3/mm3 10.39 11.11* 10.21 9.78   HEMOGLOBIN g/dL 14.4 15.1 14.5 14.5   PLATELETS 10*3/mm3 335 341 318 277     Results from last 7 days   Lab Units 24  1200 24  0749 24  2351 24  1147 24  0457 24  2355 24  1949   SODIUM mmol/L 138 139 139 138   < > 139   < > 140   POTASSIUM mmol/L  --  3.9  --  3.6  --  3.7  --  3.8   CHLORIDE mmol/L  --  105  --  102  --  106  --  108*   CO2 mmol/L  --  25.2  --  24.0  --  22.0  --  21.2*   BUN mg/dL  --  16  --  17  --  16  --  16   CREATININE mg/dL  --  0.73*  --  0.85  --  0.72*  --  0.82   GLUCOSE mg/dL  --  99  --  124*  --  104*  --  132*   EGFR mL/min/1.73  --  110.2  --  105.2  --  110.6  --  106.4    < > = values in this interval not displayed.     Results from last 7 days   Lab Units 24  0302   ALBUMIN g/dL 4.0   BILIRUBIN mg/dL 0.5   ALK PHOS U/L 106   AST (SGOT) U/L 28   ALT (SGPT) U/L 32     Results from last 7 days   Lab Units 24  0749 24  0457 24  1949 24  1823  04/09/24  0302   CALCIUM mg/dL 8.8 9.0 9.1 9.1   < > 8.5*   ALBUMIN g/dL  --   --   --   --   --  4.0   MAGNESIUM mg/dL  --   --   --   --   --  2.1   PHOSPHORUS mg/dL  --   --   --   --   --  2.4*    < > = values in this interval not displayed.     Results from last 7 days   Lab Units 04/08/24 0227 04/07/24 2016   LACTATE mmol/L 2.2* 3.2*     Glucose   Date/Time Value Ref Range Status   04/11/2024 2224 119 70 - 130 mg/dL Final       No radiology results for the last day    I have personally reviewed all medications:  Scheduled Medications  amLODIPine, 10 mg, Oral, Q24H  aspirin, 81 mg, Oral, Daily  atorvastatin, 80 mg, Oral, Nightly  clopidogrel, 75 mg, Oral, Daily  folic acid, 1 mg, Oral, Daily  multivitamin with minerals, 1 tablet, Oral, Daily  thiamine, 100 mg, Oral, Daily    Infusions   Diet  Diet: Regular/House; Texture: Pureed (NDD 1); Fluid Consistency: Honey Thick    Assessment/Plan     Active Hospital Problems    Diagnosis  POA    **Alcohol intoxication [F10.929]  Yes    Cerebellar stroke [I63.9]  Unknown    Cerebral edema [G93.6]  Unknown    HTN (hypertension) [I10]  Unknown    Transaminitis [R74.01]  Unknown      Resolved Hospital Problems   No resolved problems to display.       53 y.o. male with Alcohol intoxication.    Acute left cerebellar ischemic stroke with brain compression  Dysphagia  -MRI and MRA revealing left dick and left cerebellar infarct  -Echocardiogram with EF 70.8%, normal diastolic function  -Neurology and neurosurgery consulted, appreciate their assistance  -Initial imaging for healed obstructive hydrocephalus involving the lateral and third ventricles  -Patient had been in ICU for hypertonic saline and q2 hour neurochecks  -Repeat head CT stable  -Working with SLP after stroke.  Current recommendation is for purée and honey thick liquids.   -PT/OT recommending acute inpatient rehab they will continue to evaluate.  -APAP for headache    Anion gap metabolic  acidosis  -resolved    Alcohol use disorder with concern for withdrawal  -Continue CIWA protocol, Ativan as needed for withdrawal  -Thiamine and folate  -Access center consulted    HTN  -Blood pressure acceptable at this time  -Continue amlodipine      SCDs for DVT prophylaxis.  Full code.  Discussed with patient and nursing staff.  Anticipate discharge TBD- Rehab consult pending       Tro Godinez MD  [facility]  04/14/24  14:53 EDT

## 2024-04-14 NOTE — PROGRESS NOTES
"DOS: 2024  NAME: Stephane Jaimes   : 1970  PCP: System, Provider Not In  Chief Complaint   Patient presents with    Alcohol Intoxication       Chief complaint: Dizziness  Subjective: Headache gone this morning.  Pt denies new symptoms.  Has had eye pain when looking to the sides    Objective:  Vital signs: /91 (BP Location: Left arm, Patient Position: Lying)   Pulse 81   Temp 97.9 °F (36.6 °C) (Oral)   Resp 16   Ht 170.2 cm (67\")   Wt 89 kg (196 lb 3.4 oz)   SpO2 100%   BMI 30.73 kg/m²      Gen: NAD, vitals reviewed  MS: oriented x3, recent/remote memory intact, normal attention/concentration, language intact, no neglect.  CN: visual acuity grossly normal, PERRL, horizontal nystagmus on right and left gaze, no disconjugate gaze, EOMI, no facial droop, no dysarthria  Motor: No pronator drift   Coordination: Dysmetria finger-nose on the left  sensory: intact to light touch all 4 ext.    ROS:  No weakness, numbness  No fevers, chills      Laboratory results:  Lab Results   Component Value Date    GLUCOSE 104 (H) 2024    CALCIUM 9.1 2024     2024    K 3.7 2024    CO2 22.0 2024     2024    BUN 16 2024    CREATININE 0.72 (L) 2024    BCR 22.2 2024    ANIONGAP 11.0 2024     Lab Results   Component Value Date    WBC 11.11 (H) 2024    HGB 15.1 2024    HCT 42.9 2024    MCV 96.4 2024     2024     Lab Results   Component Value Date     (H) 2024         Lab 24  0521   HEMOGLOBIN A1C 6.00*        Review of labs:     MRI/A  IMPRESSION:     1. Redemonstration of the large subacute infarct involving the majority  of the left cerebellar hemisphere and the cerebellar vermis with  associated mass effect on the dick and basilar cisterns and mild  hydronephrosis.  2. Redemonstration of no flow in the left superior cerebellar artery.  3. Similar-appearing lack of flow in the proximal and mid " right  vertebral artery with minimal flow in the proximal right V4 segment and  no flow seen distally in the right V4 segment.  4. Small foci of restricted diffusion and high T2/FLAIR signal in the  dick, also likely subacute infarcts.  5. Scattered periventricular and subcortical white matter high T2/FLAIR  signal, statistically representing chronic small vessel ischemic  changes.  6. Normal variant left persistent posterior fetal circulation.     Review and interpretation of imaging:     Workup to date:  TTE    Left ventricular systolic function is hyperdynamic (EF > 70%). Calculated left ventricular EF = 70.8%    Left ventricular wall thickness is consistent with mild concentric hypertrophy.    Left ventricular diastolic function was normal.  Diagnoses:  1 large left cerebellar stroke  2 EtOH use  3 hypertension is ER  4 concern for aspiration pneumonia    Impression: 53-year-old  gentleman was found unresponsive in his car by EMS on 4/7.  He was given Narcan with no improvement.  On neuro eval he had persistent right gaze preference and left-sided neglect with weakness. HCT showed hypodensity of L cerebellum and not intervention candidate.  He was monitored in ICU and on hypertonic fluids.    Neurosurgery consulted.  BP improved, required antihypertensive gtts.  His exam is stable and he has moved out of ICU.  Headache yesterday is resolved.    CTA reviewed with stroke attending and no significant stenosis, less concern for occlusion of L vertebral artery.  TTE negative for cardiac source.  KYLE thought contraindicated for concern for inc of ICP in setting of large stroke.  He will need zio.  He endorses palpitations.  Strongly suspicious for cardiac source.  Will initiate on DAPT with plan for repeat CTA in 2 mos.     Plan:  1 ASA 81 and plavis 75 until repeat vascular studies outpt  3 cont on high intensity statin  4 zio at d/c, consider KYLE and/or loop outpt  5 cont to monitor bp, try to goal 130  systolic  6 needs sleep study outpt, concern for GEORGE clincally  7 cont work with ancillary services, suspect candidate for acute rehab    Neuro team available as needed    Thank you for this consultation.  Discussed above plan with neuro attending, Dr. Quinones who agrees with above plan.  Neurology team is available for concerns or questions.

## 2024-04-14 NOTE — THERAPY DISCHARGE NOTE
[facility]  [facility]  [phone]    ACCESS CENTER STATEMENT OF DISPOSITION        I, Stephane Jaimes, was assessed in the [facility] at [facility] on 4/14/2024.  I understand the recommendations below and what follow-up action is expected of me.    Outpt Counseling Resource:    Nelda Demarco [place] [place]  [phone]                ________________________________  Patient/Parent/Guardian/POA Signature    ________________________________  Clinician Signature    4/14/2024  10:45 EDT

## 2024-04-15 LAB
ANION GAP SERPL CALCULATED.3IONS-SCNC: 10.6 MMOL/L (ref 5–15)
ANION GAP SERPL CALCULATED.3IONS-SCNC: 10.7 MMOL/L (ref 5–15)
BUN SERPL-MCNC: 16 MG/DL (ref 6–20)
BUN SERPL-MCNC: 16 MG/DL (ref 6–20)
BUN/CREAT SERPL: 19.5 (ref 7–25)
BUN/CREAT SERPL: 21.1 (ref 7–25)
CALCIUM SPEC-SCNC: 9 MG/DL (ref 8.6–10.5)
CALCIUM SPEC-SCNC: 9.1 MG/DL (ref 8.6–10.5)
CHLORIDE SERPL-SCNC: 102 MMOL/L (ref 98–107)
CHLORIDE SERPL-SCNC: 102 MMOL/L (ref 98–107)
CO2 SERPL-SCNC: 24.3 MMOL/L (ref 22–29)
CO2 SERPL-SCNC: 24.4 MMOL/L (ref 22–29)
CREAT SERPL-MCNC: 0.76 MG/DL (ref 0.76–1.27)
CREAT SERPL-MCNC: 0.82 MG/DL (ref 0.76–1.27)
DEPRECATED RDW RBC AUTO: 44.7 FL (ref 37–54)
EGFRCR SERPLBLD CKD-EPI 2021: 106.4 ML/MIN/1.73
EGFRCR SERPLBLD CKD-EPI 2021: 108.8 ML/MIN/1.73
ERYTHROCYTE [DISTWIDTH] IN BLOOD BY AUTOMATED COUNT: 12.3 % (ref 12.3–15.4)
GLUCOSE SERPL-MCNC: 97 MG/DL (ref 65–99)
GLUCOSE SERPL-MCNC: 97 MG/DL (ref 65–99)
HCT VFR BLD AUTO: 45.2 % (ref 37.5–51)
HGB BLD-MCNC: 14.5 G/DL (ref 13–17.7)
MCH RBC QN AUTO: 31.5 PG (ref 26.6–33)
MCHC RBC AUTO-ENTMCNC: 32.1 G/DL (ref 31.5–35.7)
MCV RBC AUTO: 98.3 FL (ref 79–97)
OSMOLALITY SERPL: 282 MOSM/KG (ref 275–300)
OSMOLALITY SERPL: 285 MOSM/KG (ref 275–300)
OSMOLALITY SERPL: 289 MOSM/KG (ref 275–300)
OSMOLALITY SERPL: 291 MOSM/KG (ref 275–300)
PLATELET # BLD AUTO: 401 10*3/MM3 (ref 140–450)
PMV BLD AUTO: 9 FL (ref 6–12)
POTASSIUM SERPL-SCNC: 4 MMOL/L (ref 3.5–5.2)
POTASSIUM SERPL-SCNC: 4.1 MMOL/L (ref 3.5–5.2)
RBC # BLD AUTO: 4.6 10*6/MM3 (ref 4.14–5.8)
SODIUM SERPL-SCNC: 135 MMOL/L (ref 136–145)
SODIUM SERPL-SCNC: 136 MMOL/L (ref 136–145)
SODIUM SERPL-SCNC: 137 MMOL/L (ref 136–145)
SODIUM SERPL-SCNC: 137 MMOL/L (ref 136–145)
SODIUM SERPL-SCNC: 138 MMOL/L (ref 136–145)
SODIUM SERPL-SCNC: 139 MMOL/L (ref 136–145)
WBC NRBC COR # BLD AUTO: 10.89 10*3/MM3 (ref 3.4–10.8)

## 2024-04-15 PROCEDURE — 97112 NEUROMUSCULAR REEDUCATION: CPT

## 2024-04-15 PROCEDURE — 80048 BASIC METABOLIC PNL TOTAL CA: CPT | Performed by: STUDENT IN AN ORGANIZED HEALTH CARE EDUCATION/TRAINING PROGRAM

## 2024-04-15 PROCEDURE — 84295 ASSAY OF SERUM SODIUM: CPT | Performed by: STUDENT IN AN ORGANIZED HEALTH CARE EDUCATION/TRAINING PROGRAM

## 2024-04-15 PROCEDURE — 83930 ASSAY OF BLOOD OSMOLALITY: CPT | Performed by: STUDENT IN AN ORGANIZED HEALTH CARE EDUCATION/TRAINING PROGRAM

## 2024-04-15 PROCEDURE — 85027 COMPLETE CBC AUTOMATED: CPT | Performed by: INTERNAL MEDICINE

## 2024-04-15 PROCEDURE — 92526 ORAL FUNCTION THERAPY: CPT

## 2024-04-15 PROCEDURE — 97535 SELF CARE MNGMENT TRAINING: CPT

## 2024-04-15 PROCEDURE — 97110 THERAPEUTIC EXERCISES: CPT

## 2024-04-15 RX ADMIN — BISACODYL 5 MG: 5 TABLET, COATED ORAL at 09:06

## 2024-04-15 RX ADMIN — ATORVASTATIN CALCIUM 80 MG: 80 TABLET, FILM COATED ORAL at 20:37

## 2024-04-15 RX ADMIN — ASPIRIN 81 MG: 81 TABLET, CHEWABLE ORAL at 09:06

## 2024-04-15 RX ADMIN — Medication 100 MG: at 09:07

## 2024-04-15 RX ADMIN — Medication 1 TABLET: at 09:07

## 2024-04-15 RX ADMIN — FOLIC ACID 1 MG: 1 TABLET ORAL at 09:07

## 2024-04-15 RX ADMIN — CLOPIDOGREL BISULFATE 75 MG: 75 TABLET, FILM COATED ORAL at 09:07

## 2024-04-15 RX ADMIN — AMLODIPINE BESYLATE 10 MG: 10 TABLET ORAL at 09:06

## 2024-04-15 NOTE — PROGRESS NOTES
Name: Stephane Jaimes ADMIT: 2024   : 1970  PCP: System, Provider Not In    MRN: [id number] LOS: 5 days   AGE/SEX: 53 y.o. male  ROOM: [facility]     Subjective   Subjective     Patient is lying on the bed and does not appear to be any distress.  Denies nausea, vomiting, abdominal pain, chest pain.    Objective   Objective     Vital Signs  Temp:  [97.3 °F (36.3 °C)-98.6 °F (37 °C)] 98.4 °F (36.9 °C)  Heart Rate:  [71-88] 78  Resp:  [16-18] 18  BP: (127-143)/(81-92) 132/87  SpO2:  [97 %-100 %] 100 %  on   ;   Device (Oxygen Therapy): room air  Body mass index is 30.73 kg/m².    Physical Exam  General:  No distress.  Lungs: Clear to auscultation bilaterally. No wheeze or crackles. No distress.   Heart: RRR, no murmurs. No edema.  Abdomen: Obese.  Soft, non-tender, non-distended. Normal bowel sounds.   Neuro: Oriented x 3    Results Review     I reviewed the patient's new clinical results:  Results from last 7 days   Lab Units 04/15/24  0656 24  0749 24  0457 24  0557   WBC 10*3/mm3 10.89* 10.39 11.11* 10.21   HEMOGLOBIN g/dL 14.5 14.4 15.1 14.5   PLATELETS 10*3/mm3 401 335 341 318     Results from last 7 days   Lab Units 04/15/24  1157 04/15/24  0752 04/15/24  0656 24  2332 24  1755 24  1200 24  0749 24  2351 24   SODIUM mmol/L 136 137 135* 139 139  137   < > 139   < > 138   POTASSIUM mmol/L  --  4.0  --   --  4.0  --  3.9  --  3.6   CHLORIDE mmol/L  --  102  --   --  105  --  105  --  102   CO2 mmol/L  --  24.3  --   --  24.0  --  25.2  --  24.0   BUN mg/dL  --  16  --   --  16  --  16  --  17   CREATININE mg/dL  --  0.76  --   --  0.85  --  0.73*  --  0.85   GLUCOSE mg/dL  --  97  --   --  113*  --  99  --  124*   EGFR mL/min/1.73  --  108.8  --   --  105.2  --  110.2  --  105.2    < > = values in this interval not displayed.     Results from last 7 days   Lab Units 24  0302   ALBUMIN g/dL 4.0   BILIRUBIN mg/dL 0.5   ALK PHOS U/L 106   AST  "(SGOT) U/L 28   ALT (SGPT) U/L 32     Results from last 7 days   Lab Units 04/15/24  0752 04/14/24  1755 04/14/24  0749 04/13/24 2016 04/09/24  1823 04/09/24  0302   CALCIUM mg/dL 9.0 8.9 8.8 9.0   < > 8.5*   ALBUMIN g/dL  --   --   --   --   --  4.0   MAGNESIUM mg/dL  --   --   --   --   --  2.1   PHOSPHORUS mg/dL  --   --   --   --   --  2.4*    < > = values in this interval not displayed.           No results found for: \"HGBA1C\", \"POCGLU\"      No radiology results for the last day    I have personally reviewed all medications:  Scheduled Medications  amLODIPine, 10 mg, Oral, Q24H  aspirin, 81 mg, Oral, Daily  atorvastatin, 80 mg, Oral, Nightly  clopidogrel, 75 mg, Oral, Daily  folic acid, 1 mg, Oral, Daily  multivitamin with minerals, 1 tablet, Oral, Daily  thiamine, 100 mg, Oral, Daily    Infusions   Diet  Diet: Regular/House; Texture: Pureed (NDD 1); Fluid Consistency: Honey Thick    Assessment/Plan     Active Hospital Problems    Diagnosis  POA    **Alcohol intoxication [F10.929]  Yes    Cerebellar stroke [I63.9]  Unknown    Cerebral edema [G93.6]  Unknown    HTN (hypertension) [I10]  Unknown    Transaminitis [R74.01]  Unknown      Resolved Hospital Problems   No resolved problems to display.       53 y.o. male with Alcohol intoxication.    Acute left cerebellar ischemic stroke with brain compression  Dysphagia  -MRI and MRA revealing left dick and left cerebellar infarct  -Echocardiogram with EF 70.8%, normal diastolic function   - continue with aspirin, Plavix and statins  -Neurology and neurosurgery consulted, appreciate their assistance  -Initial imaging for healed obstructive hydrocephalus involving the lateral and third ventricles  -Patient had been in ICU for hypertonic saline and q2 hour neurochecks  -Repeat head CT stable  -Need Zio patch upon discharge  -Follow-up with cardiology for consideration of KYLE and or loop recorder as an outpatient basis  -Working with SLP after stroke.  Current " recommendation is for purée and honey thick liquids.   -PT/OT recommending acute inpatient rehab they will continue to evaluate.  -APAP for headache    Anion gap metabolic acidosis  -resolved    Alcohol use disorder with concern for withdrawal  -on a CIWA protocol and will continue with folate and thiamine supplements.  Currently not going through any active withdrawal  -[facility] did evaluate him      HTN  -Blood pressure acceptable at this time  -Continue amlodipine      SCDs for DVT prophylaxis.  Full code.  Discussed with patient and nursing staff.  Anticipate discharge TBD- Rehab consult pending       Ted Gonzalez MD  [facility]  04/15/24  16:03 EDT

## 2024-04-15 NOTE — PLAN OF CARE
Goal Outcome Evaluation:  Plan of Care Reviewed With: patient           Outcome Evaluation: Pt agreeable to SLP follow up. He is alert and oriented. Impulsivity appears to improved as he is able to follow cues to take single sips. He accepted PO trials of ice , water by tsp/cup, honey thick liquid by cup, and puree. Good bolus acceptance. Laryngeal elevation is subjectively weak per palpation. No overt s/s of aspiration with ice by tsp, water by tsp, HTL, or puree. x1 cough with water by cup. Intermittent throat clear throughout session present with all consistencies and difficult to assess if directly related to PO. Mentation appears to have improved compared to previous documenation. D/t inconsistent presentation at bedside and improved mentation would recommend re-eval via VFSS to reassess swallow function and if appropriate for diet upgrade. Plan to complete next service date as appropriate. Recommend continue puree/HTL pending VFSS completion. Pt v/u.

## 2024-04-15 NOTE — PLAN OF CARE
Goal Outcome Evaluation:              Outcome Evaluation: Improved activity tolerance and indepndence w/ mobility during therapy today.  Able to sit up w/ min A. Able to ambulate total of 3' w/ steps towards HOB>foot of bed>HOB w/ mod A x2, HHAx2, assist  for weight shift, ataxic.  Recommend DC to acute rehab.      Anticipated Discharge Disposition (PT): inpatient rehabilitation facility

## 2024-04-15 NOTE — PROGRESS NOTES
Continued Stay Note  [facility]     Patient Name: Stephane Jaimes  MRN: [id number]  Today's Date: 4/15/2024    Admit Date: 4/6/2024    Plan: Home with family   Discharge Plan       Row Name 04/15/24 1011       Plan    Plan Home with family    Patient/Family in Agreement with Plan yes    Plan Comments Pt's plan is home with his SO with whom he resides. Pt has no insurance or SSN on file, and thus any post hospital rehab/DME needs will be private pay. Pt enrolled in [facility] for any d/c prescriptions. CCP continues to follow. Celestina Hills LCSW                   Discharge Codes    No documentation.                 Expected Discharge Date and Time       Expected Discharge Date Expected Discharge Time    Apr 11, 2024               Ros[place]aria [place] LCSW

## 2024-04-15 NOTE — THERAPY TREATMENT NOTE
Patient Name: Stephane Jaimes  : 1970    MRN: [id number]                              Today's Date: 4/15/2024       Admit Date: 2024    Visit Dx:     ICD-10-CM ICD-9-CM   1. Alcoholic intoxication without complication  F10.920 305.00   2. Transaminitis  R74.01 790.4   3. High anion gap metabolic acidosis  E87.29 276.2   4. Cerebellar stroke  I63.9 434.91     Patient Active Problem List   Diagnosis    Alcohol intoxication    Transaminitis    HTN (hypertension)    Cerebellar stroke    Cerebral edema     History reviewed. No pertinent past medical history.  History reviewed. No pertinent surgical history.   General Information       Row Name 04/15/24 1529 04/15/24 1411       OT Time and Intention    Document Type therapy note (daily note)  -CE therapy note (daily note)  -CE    Mode of Treatment co-treatment;occupational therapy;physical therapy;other (see comments)  appropriate for co-tx 2/2 level of assist required to safely attempt mobility at EOB  -CE --      Row Name 04/15/24 1529          General Information    Patient Profile Reviewed yes  -CE     Existing Precautions/Restrictions fall  -CE       Row Name 04/15/24 1529          Cognition    Orientation Status (Cognition) oriented x 3  -CE       Row Name 04/15/24 1529          Safety Issues, Functional Mobility    Impairments Affecting Function (Mobility) postural/trunk control;endurance/activity tolerance;balance;cognition;strength;coordination;visual/perceptual  -CE     Cognitive Impairments, Mobility Safety/Performance insight into deficits/self-awareness;judgment;problem-solving/reasoning;safety precaution awareness;safety precaution follow-through;sequencing abilities  -CE               User Key  (r) = Recorded By, (t) = Taken By, (c) = Cosigned By      Initials Name Provider Type    CE Corie Rodriguez OT Occupational Therapist                     Mobility/ADL's       Row Name 04/15/24 1530          Bed Mobility    Bed Mobility supine-sit;sit-supine   -CE     Supine-Sit [place] (Bed Mobility) minimum assist (75% patient effort)  -CE     Sit-Supine [place] (Bed Mobility) set up;contact guard  -CE     Assistive Device (Bed Mobility) bed rails;head of bed elevated  -CE       Row Name 04/15/24 1530          Transfers    Transfers sit-stand transfer  -CE       Row Name 04/15/24 1530          Sit-Stand Transfer    Sit-Stand [place] (Transfers) minimum assist (75% patient effort);moderate assist (50% patient effort);2 person assist  -CE     Comment, (Sit-Stand Transfer) x 2 trials with B knees blocked for safety 2/2 ataxia  -CE       Row Name 04/15/24 1530          Functional Mobility    Functional Mobility- Ind. Level moderate assist (50% patient effort);2 person assist required  -CE     Functional Mobility- Comment Pt able to take side steps B at EOB with mod-maxA x 2 2/2 weakness, poor motor control and ataxia  -CE       Row Name 04/15/24 1530          Activities of Daily Living    BADL Assessment/Intervention lower body dressing  -CE       Row Name 04/15/24 1530          Lower Body Dressing Assessment/Training    [place] Level (Lower Body Dressing) minimum assist (75% patient effort);socks;don;verbal cues  -CE     Position (Lower Body Dressing) edge of bed sitting  -CE     Comment, (Lower Body Dressing) increased time and CGA for balance during dynamic sitting task at EOB  -CE               User Key  (r) = Recorded By, (t) = Taken By, (c) = Cosigned By      Initials Name Provider Type    CE Corie Rodriguez OT Occupational Therapist                   Obj/Interventions       Row Name 04/15/24 1532          Vision Assessment/Intervention    Vision Assessment Comment pt reporting diplopia; states this improves with either eye occluded; requested RN order patch from central supply and apply to either eye but rotate every few hours while awake; pt left with tape and gauze over L eye initially  -CE       Row Name 04/15/24 1532          Motor Skills     Motor Skills coordination  -CE     Coordination fine motor deficit;gross motor deficit;left;ataxia  able to complete reaching tasks x 5 reps with mod verbal and visual cues  -CE               User Key  (r) = Recorded By, (t) = Taken By, (c) = Cosigned By      Initials Name Provider Type    CE Corie Rodriguez OT Occupational Therapist                   Goals/Plan    No documentation.                  Clinical Impression       Row Name 04/15/24 1534          Pain Assessment    Pretreatment Pain Rating 0/10 - no pain  -CE     Posttreatment Pain Rating 0/10 - no pain  -CE       Row Name 04/15/24 1534          Plan of Care Review    Plan of Care Reviewed With patient  -CE     Progress improving  -CE     Outcome Evaluation Pt seen for OT tx focusing on bed mobility in prep for transfers OOB as well as LB ADLs and vision. Able to complete LBD with Tanner seated EOB demonstrating improved static and dynamic sitting balance. STS x 2 with Tanner x 2 but mod-maxA x 2 for side steps 2/2 poor motor control/ataxia. Requested patch from nursing to apply 2/2 reports of diplopia (improving with either eye occluded). OT will cont' to follow for stated goals.  -CE       Row Name 04/15/24 1534          Therapy Plan Review/Discharge Plan (OT)    Anticipated Discharge Disposition (OT) inpatient rehabilitation facility  -CE       Row Name 04/15/24 1534          Vital Signs    O2 Delivery Pre Treatment room air  -CE     O2 Delivery Intra Treatment room air  -CE     O2 Delivery Post Treatment room air  -CE     Pre Patient Position Supine  -CE     Intra Patient Position Standing  -CE     Post Patient Position Supine  -CE       Row Name 04/15/24 1534          Positioning and Restraints    Pre-Treatment Position in bed  -CE     Post Treatment Position bed  -CE     In Bed notified nsg;supine;fowlers;call light within reach;encouraged to call for assist;exit alarm on  -CE               User Key  (r) = Recorded By, (t) = Taken By, (c) =  Cosigned By      Initials Name Provider Type    CE Corie Rodriguez OT Occupational Therapist                   Outcome Measures       Row Name 04/15/24 1536          How much help from another is currently needed...    Putting on and taking off regular lower body clothing? 1  -CE     Bathing (including washing, rinsing, and drying) 2  -CE     Toileting (which includes using toilet bed pan or urinal) 2  -CE     Putting on and taking off regular upper body clothing 2  -CE     Taking care of personal grooming (such as brushing teeth) 3  -CE     Eating meals 3  -CE     AM-PAC 6 Clicks Score (OT) 13  -CE       Row Name 04/15/24 1418          How much help from another person do you currently need...    Turning from your back to your side while in flat bed without using bedrails? 4  -AR     Moving from lying on back to sitting on the side of a flat bed without bedrails? 2  -AR     Moving to and from a bed to a chair (including a wheelchair)? 2  -AR     Standing up from a chair using your arms (e.g., wheelchair, bedside chair)? 2  -AR     Climbing 3-5 steps with a railing? 1  -AR     To walk in hospital room? 2  -AR     AM-PAC 6 Clicks Score (PT) 13  -AR     Highest Level of Mobility Goal 4 --> Transfer to chair/commode  -AR       Row Name 04/15/24 1536 04/15/24 1418       Functional Assessment    Outcome Measure Options AM-PAC 6 Clicks Daily Activity (OT)  -CE AM-PAC 6 Clicks Basic Mobility (PT)  -AR              User Key  (r) = Recorded By, (t) = Taken By, (c) = Cosigned By      Initials Name Provider Type    Traci Reyes PT Physical Therapist    Corie Sands OT Occupational Therapist                    Occupational Therapy Education       Title: PT OT SLP Therapies (In Progress)       Topic: Occupational Therapy (In Progress)       Point: ADL training (Done)       Description:   Instruct learner(s) on proper safety adaptation and remediation techniques during self care or transfers.   Instruct in  proper use of assistive devices.                  Learning Progress Summary             Patient Acceptance, E, VU by  at 4/11/2024 0338    Comment: OT goals, POC, dc recommendations                         Point: Home exercise program (Not Started)       Description:   Instruct learner(s) on appropriate technique for monitoring, assisting and/or progressing therapeutic exercises/activities.                  Learner Progress:  Not documented in this visit.              Point: Precautions (Not Started)       Description:   Instruct learner(s) on prescribed precautions during self-care and functional transfers.                  Learner Progress:  Not documented in this visit.              Point: Body mechanics (Not Started)       Description:   Instruct learner(s) on proper positioning and spine alignment during self-care, functional mobility activities and/or exercises.                  Learner Progress:  Not documented in this visit.                              User Key       Initials Effective Dates Name Provider Type Discipline     04/02/20 -  Jenn Lewis OT Occupational Therapist OT                  OT Recommendation and Plan     Plan of Care Review  Plan of Care Reviewed With: patient  Progress: improving  Outcome Evaluation: Pt seen for OT tx focusing on bed mobility in prep for transfers OOB as well as LB ADLs and vision. Able to complete LBD with Tanner seated EOB demonstrating improved static and dynamic sitting balance. STS x 2 with Tanner x 2 but mod-maxA x 2 for side steps 2/2 poor motor control/ataxia. Requested patch from nursing to apply 2/2 reports of diplopia (improving with either eye occluded). OT will cont' to follow for stated goals.     Time Calculation:         Time Calculation- OT       Row Name 04/15/24 1536             Time Calculation- OT    OT Start Time 0949  -CE      OT Stop Time 1013  -CE      OT Time Calculation (min) 24 min  -CE      Total Timed Code Minutes- OT 24 minute(s)   -CE      OT Received On 04/15/24  -CE      OT - Next Appointment 04/16/24  -CE         Timed Charges    33625 -  OT Neuromuscular Reeducation Minutes 12  -CE      56293 - OT Self Care/Mgmt Minutes 12  -CE         Total Minutes    Timed Charges Total Minutes 24  -CE       Total Minutes 24  -CE                User Key  (r) = Recorded By, (t) = Taken By, (c) = Cosigned By      Initials Name Provider Type    CE Corie Rodriguez OT Occupational Therapist                  Therapy Charges for Today       Code Description Service Date Service Provider Modifiers Qty    [id number]  OT NEUROMUSC RE EDUCATION EA 15 MIN 4/15/2024 Corie Rodriguez OT GO 1    [id number]  OT SELF CARE/MGMT/TRAIN EA 15 MIN 4/15/2024 Corie Rodriguez OT GO 1                 Corie Rodriguez OT  4/15/2024

## 2024-04-15 NOTE — PLAN OF CARE
Goal Outcome Evaluation:  Plan of Care Reviewed With: patient        Progress: improving  Outcome Evaluation: Pt seen for OT tx focusing on bed mobility in prep for transfers OOB as well as LB ADLs and vision. Able to complete LBD with Tanner seated EOB demonstrating improved static and dynamic sitting balance. STS x 2 with Tanner x 2 but mod-maxA x 2 for side steps 2/2 poor motor control/ataxia. Requested patch from nursing to apply 2/2 reports of diplopia (improving with either eye occluded). OT will cont' to follow for stated goals.      Anticipated Discharge Disposition (OT): inpatient rehabilitation facility

## 2024-04-15 NOTE — NURSING NOTE
Access follow up regarding etoh: chart reviewed. Last CIWA score noted of 2. Pt has been provided with English speaking resources. Appeared asleep. No acute changes reported. Will follow.

## 2024-04-15 NOTE — THERAPY TREATMENT NOTE
Acute Care - Speech Language Pathology   Swallow Treatment Note [facility]     Patient Name: Stephane Jaimes  : 1970  MRN: [id number]  Today's Date: 4/15/2024               Admit Date: 2024    Visit Dx:     ICD-10-CM ICD-9-CM   1. Alcoholic intoxication without complication  F10.920 305.00   2. Transaminitis  R74.01 790.4   3. High anion gap metabolic acidosis  E87.29 276.2   4. Cerebellar stroke  I63.9 434.91     Patient Active Problem List   Diagnosis    Alcohol intoxication    Transaminitis    HTN (hypertension)    Cerebellar stroke    Cerebral edema     History reviewed. No pertinent past medical history.  History reviewed. No pertinent surgical history.    SLP Recommendation and Plan       Plan of Care Reviewed With: patient  Outcome Evaluation: Pt agreeable to SLP follow up. He is alert and oriented. Impulsivity appears to improved as he is able to follow cues to take single sips. He accepted PO trials of ice , water by tsp/cup, honey thick liquid by cup, and puree. Good bolus acceptance. Laryngeal elevation is subjectively weak per palpation. No overt s/s of aspiration with ice by tsp, water by tsp, HTL, or puree. x1 cough with water by cup. Intermittent throat clear throughout session present with all consistencies and difficult to assess if directly related to PO. Mentation appears to have improved compared to previous documenation. D/t inconsistent presentation at bedside and improved mentation would recommend re-eval via VFSS to reassess swallow function and if appropriate for diet upgrade. Plan to complete next service date as appropriate. Recommend continue puree/HTL pending VFSS completion. Pt v/u.          EDUCATION  The patient has been educated in the following areas:   Dysphagia (Swallowing Impairment) Modified Diet Instruction.        SLP GOALS       Row Name 04/15/24 1200       (STG) Swallow 1    (STG) Swallow 1 Patient will tolerate least restrictive diet without overt s/s of pen/asp   -HF    [place] (Swallow Short Term Goal 1) independently (over 90% accuracy)  -HF    Time Frame (Swallow Short Term Goal 1) by discharge  -HF    Comment (Swallow Short Term Goal 1)  Pt seen for diet tolerance this date. iPad  used for translations. Pt alert and oriented x4. He accepted ice chips and sips of water by tsp/cup. He also later accepted PO trials of puree and honey thick liquids via cup. Good bolus acceptance.  Laryngeal elevation is subjectively weak per palpation. No overt s/s of aspiration with ice by tsp, water by tsp, HTL, or puree. x1 cough with water by cup. Intermittent throat clear throughout session present with all consistencies and difficult to assess if directly related to PO. Mentation appears to have improved compared to previous documenation. D/t inconsistent presentation at bedside and improved mentation would recommend re-eval via VFSS to reassess swallow function and if appropriate for diet upgrade. Plan to complete next service date as appropriate. Recommend continue puree/HTL pending VFSS completion. Pt v/u      -HF              User Key  (r) = Recorded By, (t) = Taken By, (c) = Cosigned By      Initials Name Provider Type    Yumiko Dick SLP Speech and Language Pathologist                       Time Calculation:    Time Calculation- SLP       Row Name 04/15/24 1248             Time Calculation- SLP    SLP Start Time 1100  -HF      SLP Received On 04/15/24  -HF                User Key  (r) = Recorded By, (t) = Taken By, (c) = Cosigned By      Initials Name Provider Type    Yumiko Dick SLP Speech and Language Pathologist                    Therapy Charges for Today       Code Description Service Date Service Provider Modifiers Qty    [id number]  ST TREATMENT SWALLOW 3 4/15/2024 Yumiko Fregoso SLP GN 1                 COSMO Casiano  4/15/2024

## 2024-04-15 NOTE — THERAPY TREATMENT NOTE
Patient Name: Stephane Jaimes  : 1970    MRN: [id number]                              Today's Date: 4/15/2024       Admit Date: 2024    Visit Dx:     ICD-10-CM ICD-9-CM   1. Alcoholic intoxication without complication  F10.920 305.00   2. Transaminitis  R74.01 790.4   3. High anion gap metabolic acidosis  E87.29 276.2   4. Cerebellar stroke  I63.9 434.91     Patient Active Problem List   Diagnosis    Alcohol intoxication    Transaminitis    HTN (hypertension)    Cerebellar stroke    Cerebral edema     History reviewed. No pertinent past medical history.  History reviewed. No pertinent surgical history.   General Information       Row Name 04/15/24 1417 04/15/24 141       Physical Therapy Time and Intention    Document Type therapy note (daily note)  -AR therapy note (daily note)  -AR    Mode of Treatment co-treatment;occupational therapy;physical therapy;other (see comments)  -AR individual therapy;physical therapy  -AR      Row Name 04/15/24 141          General Information    Patient Profile Reviewed yes  -AR     Existing Precautions/Restrictions fall  -AR       Row Name 04/15/24 141          Cognition    Orientation Status (Cognition) oriented x 3  -AR       Row Name 04/15/24 1417          Safety Issues, Functional Mobility    Comment, Safety Issues/Impairments (Mobility) Co treatment medically appropriate and necessary due to patient acuity level, activity tolerance and safety of patient and staff. Treatment is focusing on progression of care and goals established in the POC.  -AR               User Key  (r) = Recorded By, (t) = Taken By, (c) = Cosigned By      Initials Name Provider Type    AR Traci Armstrong PT Physical Therapist                   Mobility       Row Name 04/15/24 1414          Bed Mobility    Supine-Sit [place] (Bed Mobility) minimum assist (75% patient effort)  -AR     Sit-Supine [place] (Bed Mobility) set up;contact guard  -AR     Assistive Device (Bed Mobility) bed  rails;head of bed elevated  -AR       [facility] Name 04/15/24 1414          Sit-Stand Transfer    Sit-Stand [place] (Transfers) minimum assist (75% patient effort);moderate assist (50% patient effort);2 person assist  -AR     Assistive Device (Sit-Stand Transfers) --  HHAx2  -AR       Row Name 04/15/24 1414          Gait/Stairs (Locomotion)    [place] Level (Gait) moderate assist (50% patient effort);2 person assist  -AR     Assistive Device (Gait) --  HHAx2  -AR     Distance in Feet (Gait) 3  -AR     Deviations/Abnormal Patterns (Gait) festinating/shuffling;stride length decreased;ataxic  -AR     Bilateral Gait Deviations forward flexed posture;heel strike decreased  -AR     Comment, (Gait/Stairs) steps towards HOB>foot of bed>HOB , assist for weight shfting, ataxic  -AR               User Key  (r) = Recorded By, (t) = Taken By, (c) = Cosigned By      Initials Name Provider Type    AR Traci Armstrong, PT Physical Therapist                   Obj/Interventions       [facility] Name 04/15/24 1416          Motor Skills    Therapeutic Exercise --  LAQ 3x  -AR       Row Name 04/15/24 1416          Balance    Static Sitting Balance contact guard  -AR     Static Standing Balance minimal assist;2-person assist  -AR     Dynamic Standing Balance moderate assist;2-person assist  -AR               User Key  (r) = Recorded By, (t) = Taken By, (c) = Cosigned By      Initials Name Provider Type    Traci Reyes, PT Physical Therapist                   Goals/Plan    No documentation.                  Clinical Impression       Row Name 04/15/24 1416          Pain    Pretreatment Pain Rating 0/10 - no pain  -AR     Posttreatment Pain Rating 0/10 - no pain  -AR     Pain Intervention(s) Repositioned  -AR       Row Name 04/15/24 1416          Plan of Care Review    Outcome Evaluation Improved activity tolerance and indepndence w/ mobility during therapy today.  Able to sit up w/ min A. Able to ambulate total of 3' w/ steps towards  HOB>foot of bed>HOB w/ mod A x2, HHAx2, assist  for weight shift, ataxic.  Recommend DC to acute rehab.  -AR       Row Name 04/15/24 1417          Vital Signs    O2 Delivery Pre Treatment room air  -AR       Row Name 04/15/24 1417          Positioning and Restraints    Pre-Treatment Position in bed  -AR     Post Treatment Position bed  -AR     In Bed notified nsg;supine;call light within reach;encouraged to call for assist;exit alarm on  -AR               User Key  (r) = Recorded By, (t) = Taken By, (c) = Cosigned By      Initials Name Provider Type    AR Traci Armstrong, PT Physical Therapist                   Outcome Measures       Row Name 04/15/24 1418          How much help from another person do you currently need...    Turning from your back to your side while in flat bed without using bedrails? 4  -AR     Moving from lying on back to sitting on the side of a flat bed without bedrails? 2  -AR     Moving to and from a bed to a chair (including a wheelchair)? 2  -AR     Standing up from a chair using your arms (e.g., wheelchair, bedside chair)? 2  -AR     Climbing 3-5 steps with a railing? 1  -AR     To walk in hospital room? 2  -AR     AM-PAC 6 Clicks Score (PT) 13  -AR     Highest Level of Mobility Goal 4 --> Transfer to chair/commode  -AR       Row Name 04/15/24 1418          Functional Assessment    Outcome Measure Options AM-PAC 6 Clicks Basic Mobility (PT)  -AR               User Key  (r) = Recorded By, (t) = Taken By, (c) = Cosigned By      Initials Name Provider Type    Traci Reyes, JANICE Physical Therapist                                 Physical Therapy Education       Title: PT OT SLP Therapies (In Progress)       Topic: Physical Therapy (In Progress)       Point: Mobility training (In Progress)       Learning Progress Summary             Patient Acceptance, E, NR by AR at 4/15/2024 1419    Acceptance, E, VU by EM at 4/13/2024 1653    Acceptance, E, VU by JG at 4/12/2024 1326    Acceptance,  E,I, NR by DJ at 4/11/2024 1033                         Point: Home exercise program (In Progress)       Learning Progress Summary             Patient Acceptance, E, NR by AR at 4/15/2024 1419    Acceptance, E, VU by JG at 4/12/2024 1326                         Point: Body mechanics (In Progress)       Learning Progress Summary             Patient Acceptance, E, NR by AR at 4/15/2024 1419    Acceptance, E, VU by JG at 4/12/2024 1326    Acceptance, E,I, NR by DJ at 4/11/2024 1033                         Point: Precautions (In Progress)       Learning Progress Summary             Patient Acceptance, E, NR by AR at 4/15/2024 1419    Acceptance, E, VU by JG at 4/12/2024 1326    Acceptance, E,I, NR by DJ at 4/11/2024 1033                                         User Key       Initials Effective Dates Name Provider Type Discipline    EM 06/16/21 -  Carolyn Schofield, PT Physical Therapist PT    AR 06/16/21 -  Traci Armstrong, PT Physical Therapist PT    DJ 10/25/19 -  Amira Gonzalez, PT Physical Therapist PT    JG 02/29/24 -  Reynaldo Lindsey PT Student PT Student PT                  PT Recommendation and Plan     Outcome Evaluation: Improved activity tolerance and indepndence w/ mobility during therapy today.  Able to sit up w/ min A. Able to ambulate total of 3' w/ steps towards HOB>foot of bed>HOB w/ mod A x2, HHAx2, assist  for weight shift, ataxic.  Recommend DC to acute rehab.     Time Calculation:         PT Charges       Row Name 04/15/24 1413             Time Calculation    Start Time 0945  -AR      Stop Time 1010  -AR      Time Calculation (min) 25 min  -AR      PT Received On 04/15/24  -AR      PT - Next Appointment 04/16/24  -AR                User Key  (r) = Recorded By, (t) = Taken By, (c) = Cosigned By      Initials Name Provider Type    AR Traci Armstrong, PT Physical Therapist                  Therapy Charges for Today       Code Description Service Date Service Provider Modifiers Qty    22009387646   PT THER PROC EA 15 MIN 4/15/2024 Traci Armstrong, PT GP 2            PT G-Codes  Outcome Measure Options: AM-PAC 6 Clicks Basic Mobility (PT)  AM-PAC 6 Clicks Score (PT): 13  AM-PAC 6 Clicks Score (OT): 13  Modified Jhon Scale: 4 - Moderately severe disability.  Unable to walk without assistance, and unable to attend to own bodily needs without assistance.  PT Discharge Summary  Anticipated Discharge Disposition (PT): inpatient rehabilitation facility    Traci Armstrong PT  4/15/2024

## 2024-04-16 ENCOUNTER — APPOINTMENT (OUTPATIENT)
Dept: GENERAL RADIOLOGY | Facility: HOSPITAL | Age: 54
End: 2024-04-16

## 2024-04-16 LAB
ANION GAP SERPL CALCULATED.3IONS-SCNC: 12.6 MMOL/L (ref 5–15)
BASOPHILS # BLD AUTO: 0.07 10*3/MM3 (ref 0–0.2)
BASOPHILS NFR BLD AUTO: 0.7 % (ref 0–1.5)
BUN SERPL-MCNC: 17 MG/DL (ref 6–20)
BUN/CREAT SERPL: 21 (ref 7–25)
CALCIUM SPEC-SCNC: 9.3 MG/DL (ref 8.6–10.5)
CHLORIDE SERPL-SCNC: 101 MMOL/L (ref 98–107)
CO2 SERPL-SCNC: 24.4 MMOL/L (ref 22–29)
CREAT SERPL-MCNC: 0.81 MG/DL (ref 0.76–1.27)
DEPRECATED RDW RBC AUTO: 42.5 FL (ref 37–54)
EGFRCR SERPLBLD CKD-EPI 2021: 106.7 ML/MIN/1.73
EOSINOPHIL # BLD AUTO: 0.44 10*3/MM3 (ref 0–0.4)
EOSINOPHIL NFR BLD AUTO: 4.6 % (ref 0.3–6.2)
ERYTHROCYTE [DISTWIDTH] IN BLOOD BY AUTOMATED COUNT: 12 % (ref 12.3–15.4)
GLUCOSE SERPL-MCNC: 96 MG/DL (ref 65–99)
HCT VFR BLD AUTO: 42.9 % (ref 37.5–51)
HGB BLD-MCNC: 14.9 G/DL (ref 13–17.7)
IMM GRANULOCYTES # BLD AUTO: 0.09 10*3/MM3 (ref 0–0.05)
IMM GRANULOCYTES NFR BLD AUTO: 0.9 % (ref 0–0.5)
LYMPHOCYTES # BLD AUTO: 1.99 10*3/MM3 (ref 0.7–3.1)
LYMPHOCYTES NFR BLD AUTO: 20.7 % (ref 19.6–45.3)
MCH RBC QN AUTO: 33.3 PG (ref 26.6–33)
MCHC RBC AUTO-ENTMCNC: 34.7 G/DL (ref 31.5–35.7)
MCV RBC AUTO: 96 FL (ref 79–97)
MONOCYTES # BLD AUTO: 1.15 10*3/MM3 (ref 0.1–0.9)
MONOCYTES NFR BLD AUTO: 12 % (ref 5–12)
NEUTROPHILS NFR BLD AUTO: 5.86 10*3/MM3 (ref 1.7–7)
NEUTROPHILS NFR BLD AUTO: 61.1 % (ref 42.7–76)
NRBC BLD AUTO-RTO: 0 /100 WBC (ref 0–0.2)
OSMOLALITY SERPL: 285 MOSM/KG (ref 275–300)
OSMOLALITY SERPL: 290 MOSM/KG (ref 275–300)
PLATELET # BLD AUTO: 383 10*3/MM3 (ref 140–450)
PMV BLD AUTO: 8.7 FL (ref 6–12)
POTASSIUM SERPL-SCNC: 4.1 MMOL/L (ref 3.5–5.2)
RBC # BLD AUTO: 4.47 10*6/MM3 (ref 4.14–5.8)
SODIUM SERPL-SCNC: 136 MMOL/L (ref 136–145)
SODIUM SERPL-SCNC: 138 MMOL/L (ref 136–145)
SODIUM SERPL-SCNC: 138 MMOL/L (ref 136–145)
WBC NRBC COR # BLD AUTO: 9.6 10*3/MM3 (ref 3.4–10.8)

## 2024-04-16 PROCEDURE — 83930 ASSAY OF BLOOD OSMOLALITY: CPT | Performed by: STUDENT IN AN ORGANIZED HEALTH CARE EDUCATION/TRAINING PROGRAM

## 2024-04-16 PROCEDURE — 74230 X-RAY XM SWLNG FUNCJ C+: CPT

## 2024-04-16 PROCEDURE — 85025 COMPLETE CBC W/AUTO DIFF WBC: CPT | Performed by: INTERNAL MEDICINE

## 2024-04-16 PROCEDURE — 80048 BASIC METABOLIC PNL TOTAL CA: CPT | Performed by: INTERNAL MEDICINE

## 2024-04-16 PROCEDURE — 97112 NEUROMUSCULAR REEDUCATION: CPT

## 2024-04-16 PROCEDURE — 97530 THERAPEUTIC ACTIVITIES: CPT

## 2024-04-16 PROCEDURE — 92611 MOTION FLUOROSCOPY/SWALLOW: CPT

## 2024-04-16 PROCEDURE — 84295 ASSAY OF SERUM SODIUM: CPT | Performed by: STUDENT IN AN ORGANIZED HEALTH CARE EDUCATION/TRAINING PROGRAM

## 2024-04-16 RX ADMIN — BARIUM SULFATE 50 ML: 400 SUSPENSION ORAL at 09:04

## 2024-04-16 RX ADMIN — AMLODIPINE BESYLATE 10 MG: 10 TABLET ORAL at 10:04

## 2024-04-16 RX ADMIN — ASPIRIN 81 MG: 81 TABLET, CHEWABLE ORAL at 10:04

## 2024-04-16 RX ADMIN — CLOPIDOGREL BISULFATE 75 MG: 75 TABLET, FILM COATED ORAL at 10:42

## 2024-04-16 RX ADMIN — FOLIC ACID 1 MG: 1 TABLET ORAL at 10:04

## 2024-04-16 RX ADMIN — Medication 100 MG: at 10:04

## 2024-04-16 RX ADMIN — BARIUM SULFATE 55 ML: 0.81 POWDER, FOR SUSPENSION ORAL at 09:04

## 2024-04-16 RX ADMIN — BARIUM SULFATE 4 ML: 980 POWDER, FOR SUSPENSION ORAL at 09:04

## 2024-04-16 RX ADMIN — ATORVASTATIN CALCIUM 80 MG: 80 TABLET, FILM COATED ORAL at 21:30

## 2024-04-16 RX ADMIN — Medication 1 TABLET: at 10:04

## 2024-04-16 NOTE — PLAN OF CARE
Problem: Adult Inpatient Plan of Care  Goal: Plan of Care Review  Outcome: Ongoing, Progressing  Goal: Patient-Specific Goal (Individualized)  Outcome: Ongoing, Progressing  Goal: Absence of Hospital-Acquired Illness or Injury  Outcome: Ongoing, Progressing  Intervention: Identify and Manage Fall Risk  Intervention: Prevent Skin Injury  Intervention: Prevent and Manage VTE (Venous Thromboembolism) Risk  Intervention: Prevent Infection  Goal: Optimal Comfort and Wellbeing  Outcome: Ongoing, Progressing  Intervention: Provide Person-Centered Care  Goal: Readiness for Transition of Care  Outcome: Ongoing, Progressing     Problem: Fall Injury Risk  Goal: Absence of Fall and Fall-Related Injury  Outcome: Ongoing, Progressing  Intervention: Identify and Manage Contributors  Intervention: Promote Injury-Free Environment     Problem: Suicide Risk  Goal: Absence of Self-Harm  Outcome: Ongoing, Progressing  Intervention: Promote Psychosocial Wellbeing     Problem: Infection (Pneumonia)  Goal: Resolution of Infection Signs and Symptoms  Outcome: Ongoing, Progressing     Problem: Alcohol Withdrawal  Goal: Alcohol Withdrawal Symptom Control  Outcome: Ongoing, Progressing     Problem: Substance Misuse (Alcohol Withdrawal)  Goal: Readiness for Change Identified  Outcome: Ongoing, Progressing  Intervention: Partner to Facilitate Behavior Change  Intervention: Promote Psychosocial Wellbeing     Problem: Skin Injury Risk Increased  Goal: Skin Health and Integrity  Outcome: Ongoing, Progressing  Intervention: Optimize Skin Protection   Goal Outcome Evaluation:

## 2024-04-16 NOTE — THERAPY TREATMENT NOTE
Patient Name: Stephane Jaimes  : 1970    MRN: [id number]                              Today's Date: 2024       Admit Date: 2024    Visit Dx:     ICD-10-CM ICD-9-CM   1. Alcoholic intoxication without complication  F10.920 305.00   2. Transaminitis  R74.01 790.4   3. High anion gap metabolic acidosis  E87.29 276.2   4. Cerebellar stroke  I63.9 434.91     Patient Active Problem List   Diagnosis    Alcohol intoxication    Transaminitis    HTN (hypertension)    Cerebellar stroke    Cerebral edema     History reviewed. No pertinent past medical history.  History reviewed. No pertinent surgical history.   General Information       Row Name 24 1222          OT Time and Intention    Document Type therapy note (daily note)  -LINDA     Mode of Treatment co-treatment;physical therapy;occupational therapy  -LINDA       Row Name 24 1222          General Information    Patient Profile Reviewed yes  -LINDA     Existing Precautions/Restrictions fall  -LINDA     Barriers to Rehab language barrier  ipad used for translating  -       Row Name 24 1222          Cognition    Orientation Status (Cognition) oriented x 3  -       Row Name 24 1222          Safety Issues, Functional Mobility    Impairments Affecting Function (Mobility) balance;endurance/activity tolerance;cognition;postural/trunk control;visual/perceptual;coordination  -LINDA     Comment, Safety Issues/Impairments (Mobility) [place] medically appropriate and necessary due to pt acuity, activity tolerance, to maximize mobility efforts and safety of pt and staff. Focus on progression of care and goals established in plan of care.  -LINDA               User Key  (r) = Recorded By, (t) = Taken By, (c) = Cosigned By      Initials Name Provider Type    Mayra Pina OT Occupational Therapist                     Mobility/ADL's       Row Name 24 1225          Bed Mobility    Supine-Sit [place] (Bed Mobility) standby assist;supervision  -LINDA      Comment, (Bed Mobility) UIC at the end of session  -       Row Name 04/16/24 1225          Transfers    Transfers sit-stand transfer  -       Row Name 04/16/24 1225          Sit-Stand Transfer    Sit-Stand [place] (Transfers) minimum assist (75% patient effort);2 person assist;verbal cues  -     Assistive Device (Sit-Stand Transfers) walker, front-wheeled  -     Comment, (Sit-Stand Transfer) 1st with HHA and 2nd with rwx  -       Row Name 04/16/24 1225          Functional Mobility    Functional Mobility- Ind. Level moderate assist (50% patient effort);2 person assist required  -     Functional Mobility- Comment Took steps forward and backward with HHAx2. Pt very unsteady due to weakness, poor motor control and ataxia in BLEs and UEs  -               User Key  (r) = Recorded By, (t) = Taken By, (c) = Cosigned By      Initials Name Provider Type    Mayra Pina, JUNE Occupational Therapist                   Obj/Interventions       Row Name 04/16/24 1227          Vision Assessment/Intervention    Vision Assessment Comment Pt with eye patch on L eye upon arrival today  -[facility] Name 04/16/24 1227          Motor Skills    Coordination fine motor deficit;gross motor deficit;left;ataxia  decreased coordination in both UE's however worse in LUE  -[facility] Name 04/16/24 1227          Balance    Static Sitting Balance standby assist  -     Dynamic Sitting Balance contact guard;standby assist  -     Position, Sitting Balance unsupported;sitting edge of bed  -     Static Standing Balance contact guard;minimal assist;2-person assist  -     Dynamic Standing Balance moderate assist;2-person assist  -     Position/Device Used, Standing Balance walker, front-wheeled;supported  -KA     Balance Interventions sitting;standing;dynamic reaching  -               User Key  (r) = Recorded By, (t) = Taken By, (c) = Cosigned By      Initials Name Provider Type    Mayra Pina, OT  "Occupational Therapist                   Goals/Plan    No documentation.                  Clinical Impression       Row Name 04/16/24 1230          Pain Assessment    Pretreatment Pain Rating 0/10 - no pain  -     Posttreatment Pain Rating 0/10 - no pain  -       Row Name 04/16/24 1230          Plan of Care Review    Plan of Care Reviewed With patient  -     Progress improving  -     Outcome Evaluation Pt seen for OT session this AM. Still reports visual deficits with eye patch in place over L eye upon arrival and dizziness that subsided. He demonstrated improvement with bed mobility with SBA. Required min Ax2 to stand with HHAx2 and with rwx for 2nd STS. He performed forward/backward steps with mod Ax2. Pt very unsteady with ataxic movements in BLEs. Therapists trialed use of rwx and he required min/mod Ax2 for mobility within room. Pt still unsteady and ataxic with use of rwx. Performed UE coodination activity seated EOB. Pt able to cross midline with both UEs reaching for therapists hand as a target. LUE is more ataxic than the R. Once sitting in the chair he performed UE coordination activity stacking cups. Pt stated, \"My L arm just won't work right and does its own thing\". Educated pt on coordination exercises he can complete. Pt left [facility] with all needs in reach.  -       Row Name 04/16/24 1230          Therapy Plan Review/Discharge Plan (OT)    Anticipated Discharge Disposition (OT) inpatient rehabilitation facility  -       Row Name 04/16/24 1230          Vital Signs    Pre Patient Position Supine  -     Intra Patient Position Standing  -     Post Patient Position Sitting  -       Row Name 04/16/24 1230          Positioning and Restraints    Pre-Treatment Position in bed  -KA     Post Treatment Position chair  -KA     In Chair reclined;call light within reach;encouraged to call for assist;exit alarm on  -               User Key  (r) = Recorded By, (t) = Taken By, (c) = Cosigned By      " Initials Name Provider Type    Mayra Pina OT Occupational Therapist                   Outcome Measures       Row Name 04/16/24 1246          How much help from another is currently needed...    Putting on and taking off regular lower body clothing? 1  -KA     Bathing (including washing, rinsing, and drying) 2  -KA     Toileting (which includes using toilet bed pan or urinal) 2  -KA     Putting on and taking off regular upper body clothing 2  -KA     Taking care of personal grooming (such as brushing teeth) 3  -KA     Eating meals 3  -KA     AM-PAC 6 Clicks Score (OT) 13  -KA       Row Name 04/16/24 1224 04/16/24 0800       How much help from another person do you currently need...    Turning from your back to your side while in flat bed without using bedrails? 4  -PH 4  -LH    Moving from lying on back to sitting on the side of a flat bed without bedrails? 3  -PH 3  -LH    Moving to and from a bed to a chair (including a wheelchair)? 2  -PH 2  -LH    Standing up from a chair using your arms (e.g., wheelchair, bedside chair)? 2  -PH 2  -LH    Climbing 3-5 steps with a railing? 1  -PH 1  -LH    To walk in hospital room? 2  -PH 2  -LH    AM-PAC 6 Clicks Score (PT) 14  -PH 14  -LH    Highest Level of Mobility Goal 4 --> Transfer to chair/commode  -PH 4 --> Transfer to chair/commode  -LH      Row Name 04/16/24 1246 04/16/24 1224       Functional Assessment    Outcome Measure Options AM-PAC 6 Clicks Daily Activity (OT)  -KA AM-PAC 6 Clicks Basic Mobility (PT)  -PH              User Key  (r) = Recorded By, (t) = Taken By, (c) = Cosigned By      Initials Name Provider Type    PH Denita Bell PTA Physical Therapist Assistant    Mayra Pina, JUNE Occupational Therapist     Patricia Mahan, RN Registered Nurse                    Occupational Therapy Education       Title: PT OT SLP Therapies (In Progress)       Topic: Occupational Therapy (In Progress)       Point: ADL training (Done)        Description:   Instruct learner(s) on proper safety adaptation and remediation techniques during self care or transfers.   Instruct in proper use of assistive devices.                  Learning Progress Summary             Patient Acceptance, E, VU by  at 4/11/2024 8687    Comment: OT goals, POC, dc recommendations                         Point: Home exercise program (Not Started)       Description:   Instruct learner(s) on appropriate technique for monitoring, assisting and/or progressing therapeutic exercises/activities.                  Learner Progress:  Not documented in this visit.              Point: Precautions (Not Started)       Description:   Instruct learner(s) on prescribed precautions during self-care and functional transfers.                  Learner Progress:  Not documented in this visit.              Point: Body mechanics (Not Started)       Description:   Instruct learner(s) on proper positioning and spine alignment during self-care, functional mobility activities and/or exercises.                  Learner Progress:  Not documented in this visit.                              User Key       Initials Effective Dates Name Provider Type Discipline     04/02/20 -  Jenn Lewis, JUNE Occupational Therapist OT                  OT Recommendation and Plan     Plan of Care Review  Plan of Care Reviewed With: patient  Progress: improving  Outcome Evaluation: Pt seen for OT session this AM. Still reports visual deficits with eye patch in place over L eye upon arrival and dizziness that subsided. He demonstrated improvement with bed mobility with SBA. Required min Ax2 to stand with HHAx2 and with rwx for 2nd STS. He performed forward/backward steps with mod Ax2. Pt very unsteady with ataxic movements in BLEs. Therapists trialed use of rwx and he required min/mod Ax2 for mobility within room. Pt still unsteady and ataxic with use of rwx. Performed UE coodination activity seated EOB. Pt able to cross  "midline with both UEs reaching for therapists hand as a target. LUE is more ataxic than the R. Once sitting in the chair he performed UE coordination activity stacking cups. Pt stated, \"My L arm just won't work right and does its own thing\". Educated pt on coordination exercises he can complete. Pt left [facility] with all needs in reach.     Time Calculation:         Time Calculation- OT       Row Name 04/16/24 1248             Time Calculation- OT    OT Start Time 1113  -KA      OT Stop Time 1142  -KA      OT Time Calculation (min) 29 min  -KA      Total Timed Code Minutes- OT 29 minute(s)  -KA      OT Received On 04/16/24  -KA      OT - Next Appointment 04/17/24  -KA         Timed Charges    91641 -  OT Neuromuscular Reeducation Minutes 29  -KA         Total Minutes    Timed Charges Total Minutes 29  -KA       Total Minutes 29  -KA                User Key  (r) = Recorded By, (t) = Taken By, (c) = Cosigned By      Initials Name Provider Type    Mayra Pina OT Occupational Therapist                  Therapy Charges for Today       Code Description Service Date Service Provider Modifiers Qty    99547455506  OT NEUROMUSC RE EDUCATION EA 15 MIN 4/16/2024 Mayra Morrell OT GO 2                 Mayra Morrell OT  4/16/2024  "

## 2024-04-16 NOTE — PLAN OF CARE
"  Problem: Adult Inpatient Plan of Care  Goal: Plan of Care Review  Recent Flowsheet Documentation  Taken 4/16/2024 1230 by Mayra Morrell OT  Progress: improving  Plan of Care Reviewed With: patient  Outcome Evaluation: Pt seen for OT session this AM. Still reports visual deficits with eye patch in place over L eye upon arrival and dizziness that subsided. He demonstrated improvement with bed mobility with SBA. Required min Ax2 to stand with HHAx2 and with rwx for 2nd STS. He performed forward/backward steps with mod Ax2. Pt very unsteady with ataxic movements in BLEs. Therapists trialed use of rwx and he required min/mod Ax2 for mobility within room. Pt still unsteady and ataxic with use of rwx. Performed UE coodination activity seated EOB. Pt able to cross midline with both UEs reaching for therapists hand as a target. LUE is more ataxic than the R. Once sitting in the chair he performed UE coordination activity stacking cups. Pt stated, \"My L arm just won't work right and does its own thing\". Educated pt on coordination exercises he can complete. Pt left [facility] with all needs in reach.   Goal Outcome Evaluation:  Plan of Care Reviewed With: patient        Progress: improving  Outcome Evaluation: Pt seen for OT session this AM. Still reports visual deficits with eye patch in place over L eye upon arrival and dizziness that subsided. He demonstrated improvement with bed mobility with SBA. Required min Ax2 to stand with HHAx2 and with rwx for 2nd STS. He performed forward/backward steps with mod Ax2. Pt very unsteady with ataxic movements in BLEs. Therapists trialed use of rwx and he required min/mod Ax2 for mobility within room. Pt still unsteady and ataxic with use of rwx. Performed UE coodination activity seated EOB. Pt able to cross midline with both UEs reaching for therapists hand as a target. LUE is more ataxic than the R. Once sitting in the chair he performed UE coordination activity stacking cups. Pt " "stated, \"My L arm just won't work right and does its own thing\". Educated pt on coordination exercises he can complete. Pt left [facility] with all needs in reach.      Anticipated Discharge Disposition (OT): inpatient rehabilitation facility                        "

## 2024-04-16 NOTE — PLAN OF CARE
Goal Outcome Evaluation:  Plan of Care Reviewed With: patient        Progress: improving  Outcome Evaluation: Pt was seen by PT for tx. Tablet  was use for communication. Pt still reporting dizziness and vision deficits w/ eye patch in place throughout session.   Pt sat up to EOB w/ SBA. Pt stood 2x req min A x 2 w/ use of fww or HHA x 2. Pt amb approx 7' fwd/bkwd req min/mod A x 2 w/ HHA x 2. Pt trialed use of fww and amb 35' req min A x 2. Pt was ataxic and unsteady throughout amb although improved somewhat w/ use of fww. R step to pattern noted. Pt was cued several times to remain within fww. Pt was UIC at end of session.      Anticipated Discharge Disposition (PT): inpatient rehabilitation facility, home with 24/7 care

## 2024-04-16 NOTE — NURSING NOTE
Access follow up regarding etoh: chart reviewed. Last CIWA score of 1. Appeared asleep and no behavioral changes noted. Pt was provided with resources. Following.

## 2024-04-16 NOTE — MBS/VFSS/FEES
Acute Care - Speech Language Pathology   Swallow Re-Evaluation [facility]     Patient Name: Stephane Jaimes  : 1970  MRN: [id number]  Today's Date: 2024               Admit Date: 2024    Visit Dx:     ICD-10-CM ICD-9-CM   1. Alcoholic intoxication without complication  F10.920 305.00   2. Transaminitis  R74.01 790.4   3. High anion gap metabolic acidosis  E87.29 276.2   4. Cerebellar stroke  I63.9 434.91     Patient Active Problem List   Diagnosis    Alcohol intoxication    Transaminitis    HTN (hypertension)    Cerebellar stroke    Cerebral edema     History reviewed. No pertinent past medical history.  History reviewed. No pertinent surgical history.    SLP Recommendation and Plan  SLP Swallowing Diagnosis: mild, oral dysphagia, pharyngeal dysphagia (24)  SLP Diet Recommendation: puree, nectar thick liquids, ice chips between meals after oral care, with supervision, water between meals after oral care, with supervision (24)  Recommended Precautions and Strategies: upright posture during/after eating, small bites of food and sips of liquid, multiple swallows per sip of liquid, multiple swallows per bite of food, alternate between small bites of food and sips of liquid, 1:1 supervision (24)  SLP Rec. for Method of Medication Administration: meds whole, meds crushed, with puree (24)     Monitor for Signs of Aspiration: yes, notify SLP if any concerns (24)  Recommended Diagnostics: reassess via clinical swallow evaluation (24)  Swallow Criteria for Skilled Therapeutic Interventions Met: demonstrates skilled criteria (24)     Rehab Potential/Prognosis, Swallowing: good, to achieve stated therapy goals (24)  Therapy Frequency (Swallow): PRN (24)  Predicted Duration Therapy Intervention (Days): until discharge (24)  Oral Care Recommendations: Oral Care BID/PRN (24)                          "               Outcome Evaluation: Repeat VFSS completed. iPad  utilized for Montenegrin. Debo OCHOA, present during the study. Slightly improved when compared to previous study on 4/11/24, however, suspect swallow continues to be impacted by mental status. Patient presents with mild oropharyngeal dysphagia. No aspiration visualized on study. x1 instance of trace nontransient penetration with thin liquid by cup suspect secondary to large drink size. No penetration with remaining trials of thin liquid by spoon/cup/straw, nectar by spoon/cup/straw, puree, or soft/chopped solid trials. Double swallows and liquid washes utilized to clear mild-moderate oropharyngeal residue. Disorganized and poor munching mastication with soft/chopped solid. Recommend puree diet with nectar thick liquids. Meds whole or crushed in puree.  Free water protocol with ice/sips of water between meals, WITH SUPERVISION. Sitting upright, slow rate, small bites/sips, alternate puree/liquids, 1:1 supervision during meals with assistance if impulsivity observed. Per SLP discretion, patient is appropriate for upgrade at the bedside if oral stage, mental status, and impulsivity improves.      SWALLOW EVALUATION (Last 72 Hours)       SLP Adult Swallow Evaluation       Row Name 04/16/24 1100                   Rehab Evaluation    Document Type evaluation  -CR        Subjective Information no complaints  -CR        Patient Observations alert;cooperative  -CR        Patient Effort good  -CR        Symptoms Noted During/After Treatment none  -CR           General Information    Patient Profile Reviewed yes  -CR        Pertinent History Of Current Problem \"found unresponsive alcohol abuse, left cerebellar stroke\"  -CR        Current Method of Nutrition pureed;honey-thick liquids  -CR        Precautions/Limitations, Vision other (see comments)  eye patch worn, however, placed on left side of forehead instead of covering eye.  -CR        " Precautions/Limitations, Hearing WFL;for purposes of eval  -CR        Prior Level of Function-Communication WFL  -CR        Prior Level of Function-Swallowing no diet consistency restrictions  -CR        Plans/Goals Discussed with patient  -CR        Barriers to Rehab language barrier  iPad  used for Burmese  -CR           Pain Scale: Numbers Pre/Post-Treatment    Pretreatment Pain Rating 0/10 - no pain  -CR        Posttreatment Pain Rating 0/10 - no pain  -CR           Oral Motor Structure and Function    Dentition Assessment natural, present and adequate;missing teeth  -CR        Secretion Management WNL/WFL  -CR        Mucosal Quality moist, healthy  -CR           Oral Musculature and Cranial Nerve Assessment    Oral Motor General Assessment WFL  -CR           MBS/VFSS Interpretation    VFSS Summary Repeat VFSS completed. Debo OCHOA, present during the study. Slightly improved when compared to previous study on 4/11/24, however, suspect swallow continues to be impacted by mental status. Patient presents with mild oropharyngeal dysphagia characterized by oral holding, poor bolus formation, mistiming, and reduced hyolaryngeal elevation. Impulsivity demonstrated during self-feeding. No aspiration visualized on study. Spill to the valleculae with thin liquid by spoon. Spill to the pyriforms with large drink of thin liquid by cup resulting in trace nontransient penetration. When SLP assisted in self-feeding, swallow triggered at the valleculae with thin liquid by cup no penetration/aspiration. Spill to the valleculae with nectar by spoon/cup/straws and puree trials with no penetration present. Spontaneous coughing following nectar thick liquid by cup. Unable to determine etiology of cough, however, question retrograde movement at the upper esophageal sphincter versus sensing nontransient penetration of thin liquids earlier in study. Double swallows and liquid washes utilized to clear mild-moderate  oropharyngeal residue. Disorganized and poor munching mastication with soft/chopped solid with swallow triggered at the pyriforms and no penetration visualized. Recommend puree diet with nectar thick liquids. Meds whole or crushed in puree. Free water protocol with ice/sips of water between meals, WITH SUPERVISION. Sitting upright, slow rate, small bites/sips, alternate puree/liquids, 1:1 supervision during meals with assistance if impulsivity observed. Per SLP discretion, patient is appropriate for upgrade at the bedside if oral stage, mental status, and impulsivity improves.  -CR           SLP Communication to Radiology    Summary Statement Repeat VFSS completed. iPad  utilized for Faroese. Dbeo OCHOA, present during the study. Slightly improved when compared to previous study on 4/11/24, however, suspect swallow continues to be impacted by mental status. Patient presents with mild oropharyngeal dysphagia. No aspiration visualized on study. x1 instance of trace nontransient penetration with thin liquid by cup suspect secondary to large drink size. No penetration with remaining trials of thin liquid by spoon/cup/straw, nectar by spoon/cup/straw, puree, or soft/chopped solid trials. Double swallows and liquid washes utilized to clear mild-moderate oropharyngeal residue. Disorganized and poor munching mastication with soft/chopped solid.  -CR           SLP Evaluation Clinical Impression    SLP Swallowing Diagnosis mild;oral dysphagia;pharyngeal dysphagia  -CR        Functional Impact risk of aspiration/pneumonia  -CR        Rehab Potential/Prognosis, Swallowing good, to achieve stated therapy goals  -CR        Swallow Criteria for Skilled Therapeutic Interventions Met demonstrates skilled criteria  -CR           Recommendations    Therapy Frequency (Swallow) PRN  -CR        Predicted Duration Therapy Intervention (Days) until discharge  -CR        SLP Diet Recommendation puree;nectar thick  liquids;ice chips between meals after oral care, with supervision;water between meals after oral care, with supervision  -CR        Recommended Diagnostics reassess via clinical swallow evaluation  -CR        Recommended Precautions and Strategies upright posture during/after eating;small bites of food and sips of liquid;multiple swallows per sip of liquid;multiple swallows per bite of food;alternate between small bites of food and sips of liquid;1:1 supervision  -CR        Oral Care Recommendations Oral Care BID/PRN  -CR        SLP Rec. for Method of Medication Administration meds whole;meds crushed;with puree  -CR        Monitor for Signs of Aspiration yes;notify SLP if any concerns  -CR           Swallow Goals (SLP)    Swallow LTGs Patient will demonstrate functional swallow for  -CR           (LTG) Patient will demonstrate functional swallow for    Diet Texture (Demonstrate functional swallow) soft to chew (chopped) textures  -CR        Liquid viscosity (Demonstrate functional swallow) thin liquids  -CR        [place] (Demonstrate functional swallow) independently (over 90% accuracy)  -CR        Time Frame (Demonstrate functional swallow) by discharge  -CR        Progress/Outcomes (Demonstrate functional swallow) new goal  -CR                  User Key  (r) = Recorded By, (t) = Taken By, (c) = Cosigned By      Initials Name Effective Dates    CR Elizabeth Avery SLP 08/28/23 -                     EDUCATION  The patient has been educated in the following areas:   Dysphagia (Swallowing Impairment).        SLP GOALS       Row Name 04/16/24 1100 04/15/24 1200          (LTG) Patient will demonstrate functional swallow for    Diet Texture (Demonstrate functional swallow) soft to chew (chopped) textures  -CR --     Liquid viscosity (Demonstrate functional swallow) thin liquids  -CR --     Doss (Demonstrate functional swallow) independently (over 90% accuracy)  -CR --     Time Frame (Demonstrate  functional swallow) by discharge  -CR --     Progress/Outcomes (Demonstrate functional swallow) new goal  -CR --        (STG) Swallow 1    (STG) Swallow 1 -- Patient will tolerate least restrictive diet without overt s/s of pen/asp  -HF     [place] (Swallow Short Term Goal 1) -- independently (over 90% accuracy)  -HF     Time Frame (Swallow Short Term Goal 1) -- by discharge  -HF     Comment (Swallow Short Term Goal 1) -- --  Pt seen for diet tolerance this date. iPad  used for translations. Pt alert and oriented x4. He accepted ice chips and sips of water by tsp/cup. He also later accepted PO trials of puree and honey thick liquids via cup. Good bolus acceptance.  -HF               User Key  (r) = Recorded By, (t) = Taken By, (c) = Cosigned By      Initials Name Provider Type    Elizabeth Orozco SLP Speech and Language Pathologist    Yumiko Dick SLP Speech and Language Pathologist                       Time Calculation:    Time Calculation- SLP       Row Name 04/16/24 1231             Time Calculation- SLP    SLP Start Time 0750  -CR      SLP Received On 04/16/24  -CR         Untimed Charges    16646-HF Motion Fluoro Eval Swallow Minutes 75  -CR         Total Minutes    Untimed Charges Total Minutes 75  -CR       Total Minutes 75  -CR                User Key  (r) = Recorded By, (t) = Taken By, (c) = Cosigned By      Initials Name Provider Type    Elizabeth Orozco SLP Speech and Language Pathologist                    Therapy Charges for Today       Code Description Service Date Service Provider Modifiers Qty    99427023049 HC ST MOTION FLUORO EVAL SWALLOW 5 4/16/2024 Elizabeth Avery SLP GN 1                 COSMO Rabago  4/16/2024

## 2024-04-16 NOTE — PROGRESS NOTES
"    Name: Stephane Jaimes ADMIT: 2024   : 1970  PCP: System, Provider Not In    MRN: [id number] LOS: 6 days   AGE/SEX: 53 y.o. male  ROOM: [facility]     Subjective   Subjective     Patient is lying on the bed and does not appear to be any distress.  Denies nausea, vomiting, abdominal pain, chest pain.    Objective   Objective     Vital Signs  Temp:  [97.5 °F (36.4 °C)-98.2 °F (36.8 °C)] 98.1 °F (36.7 °C)  Heart Rate:  [73-85] 81  Resp:  [18] 18  BP: (111-132)/(75-89) 119/86  SpO2:  [97 %-100 %] 100 %  on   ;   Device (Oxygen Therapy): room air  Body mass index is 30.73 kg/m².    Physical Exam  General:  No distress.  Lungs: Clear to auscultation bilaterally. No wheeze or crackles. No distress.   Heart: RRR, no murmurs. No edema.  Abdomen: Obese.  Soft, non-tender, non-distended. Normal bowel sounds.   Neuro: Oriented x 3    Results Review     I reviewed the patient's new clinical results:  Results from last 7 days   Lab Units 24  0546 04/15/24  0656 24  0749 24  0457   WBC 10*3/mm3 9.60 10.89* 10.39 11.11*   HEMOGLOBIN g/dL 14.9 14.5 14.4 15.1   PLATELETS 10*3/mm3 383 401 335 341     Results from last 7 days   Lab Units 24  0546 04/15/24  2337 04/15/24  1800 04/15/24  1157 04/15/24  0752 24  2332 24  1755   SODIUM mmol/L 138  138 136 137  138 136 137   < > 139  137   POTASSIUM mmol/L 4.1  --  4.1  --  4.0  --  4.0   CHLORIDE mmol/L 101  --  102  --  102  --  105   CO2 mmol/L 24.4  --  24.4  --  24.3  --  24.0   BUN mg/dL 17  --  16  --  16  --  16   CREATININE mg/dL 0.81  --  0.82  --  0.76  --  0.85   GLUCOSE mg/dL 96  --  97  --  97  --  113*   EGFR mL/min/1.73 106.7  --  106.4  --  108.8  --  105.2    < > = values in this interval not displayed.           Results from last 7 days   Lab Units 24  0546 04/15/24  1800 04/15/24  0752 24  1755   CALCIUM mg/dL 9.3 9.1 9.0 8.9           No results found for: \"HGBA1C\", \"POCGLU\"      No radiology results for the last " day    I have personally reviewed all medications:  Scheduled Medications  amLODIPine, 10 mg, Oral, Q24H  aspirin, 81 mg, Oral, Daily  atorvastatin, 80 mg, Oral, Nightly  clopidogrel, 75 mg, Oral, Daily  folic acid, 1 mg, Oral, Daily  multivitamin with minerals, 1 tablet, Oral, Daily  thiamine, 100 mg, Oral, Daily    Infusions   Diet  Diet: Regular/House; Feeding Assistance - Nursing; Texture: Pureed (NDD 1); Fluid Consistency: Nectar Thick    Assessment/Plan     Active Hospital Problems    Diagnosis  POA    **Alcohol intoxication [F10.929]  Yes    Cerebellar stroke [I63.9]  Unknown    Cerebral edema [G93.6]  Unknown    HTN (hypertension) [I10]  Unknown    Transaminitis [R74.01]  Unknown      Resolved Hospital Problems   No resolved problems to display.       53 y.o. male with Alcohol intoxication.    Acute left cerebellar ischemic stroke with brain compression  Dysphagia  -MRI and MRA revealing left dick and left cerebellar infarct  -Echocardiogram with EF 70.8%, normal diastolic function   - continue with aspirin, Plavix and statins  -Neurology and neurosurgery consulted, appreciate their assistance  -Initial imaging for healed obstructive hydrocephalus involving the lateral and third ventricles  -Patient had been in ICU for hypertonic saline and q2 hour neurochecks  -Repeat head CT stable  -Need Zio patch upon discharge  -Follow-up with cardiology for consideration of KYLE and or loop recorder as an outpatient basis  -Working with SLP after stroke.  Current recommendation is for purée and honey thick liquids.   -PT/OT recommending acute inpatient rehab   -APAP for headache    Anion gap metabolic acidosis  -resolved    Alcohol use disorder with concern for withdrawal  -on a CIWA protocol and will continue with folate and thiamine supplements.  Currently not going through any active withdrawal  -[facility] did evaluate him      HTN  -Blood pressure acceptable at this time  -Continue amlodipine      SCDs for DVT  prophylaxis.  Full code.  Discussed with patient and nursing staff.  Anticipate discharge TBD- Rehab consult pending     Copied text on this note has been reviewed by me on 4/16/2024    Ted Gonzalez MD  [facility]  04/16/24  16:24 EDT

## 2024-04-16 NOTE — THERAPY TREATMENT NOTE
Patient Name: Stephane Jaimes  : 1970    MRN: [id number]                              Today's Date: 2024       Admit Date: 2024    Visit Dx:     ICD-10-CM ICD-9-CM   1. Alcoholic intoxication without complication  F10.920 305.00   2. Transaminitis  R74.01 790.4   3. High anion gap metabolic acidosis  E87.29 276.2   4. Cerebellar stroke  I63.9 434.91     Patient Active Problem List   Diagnosis    Alcohol intoxication    Transaminitis    HTN (hypertension)    Cerebellar stroke    Cerebral edema     History reviewed. No pertinent past medical history.  History reviewed. No pertinent surgical history.   General Information       Row Name 24 1208          Physical Therapy Time and Intention    Document Type therapy note (daily note)  -PH     Mode of Treatment co-treatment;occupational therapy;physical therapy  -PH       Row Name 24 1208          General Information    Existing Precautions/Restrictions fall  -PH       Row Name 24 1208          Safety Issues, Functional Mobility    Impairments Affecting Function (Mobility) balance;endurance/activity tolerance;cognition;postural/trunk control;visual/perceptual;coordination  -PH     Cognitive Impairments, Mobility Safety/Performance impulsivity;insight into deficits/self-awareness;safety precaution awareness;safety precaution follow-through;awareness, need for assistance;sequencing abilities  -PH     Comment, Safety Issues/Impairments (Mobility) Co treatment medically appropriate and necessary due to patient acuity level, activity tolerance and safety of patient and staff. Treatment is focusing on progression of care and goals established in the POC.  -PH               User Key  (r) = Recorded By, (t) = Taken By, (c) = Cosigned By      Initials Name Provider Type    PH Denita Bell PTA Physical Therapist Assistant                   Mobility       Row Name 24 1209          Bed Mobility    Bed Mobility supine-sit  -PH      Supine-Sit [place] (Bed Mobility) standby assist;supervision  -PH     Assistive Device (Bed Mobility) bed rails;head of bed elevated  -PH       Row Name 04/16/24 1209          Sit-Stand Transfer    Sit-Stand [place] (Transfers) minimum assist (75% patient effort);2 person assist;verbal cues  -PH     Assistive Device (Sit-Stand Transfers) walker, front-wheeled  -PH     Comment, (Sit-Stand Transfer) STS x 2; 1st stand w/ HHA x 2, 2nd w/ use of fww;  -PH       Row Name 04/16/24 1209          Gait/Stairs (Locomotion)    [place] Level (Gait) 2 person assist;verbal cues;nonverbal cues (demo/gesture);moderate assist (50% patient effort);minimum assist (75% patient effort)  -PH     Assistive Device (Gait) walker, front-wheeled;other (see comments)  HHA x 2  -PH     Distance in Feet (Gait) 42  7' fwd/bkd w/ HHA x 2; 35' w/ use of fww  -PH     Deviations/Abnormal Patterns (Gait) ataxic;stride length decreased;weight shifting decreased  -PH     Bilateral Gait Deviations heel strike decreased;forward flexed posture  -PH     [place] Level (Stairs) not tested  -PH     Comment, (Gait/Stairs) unsteadiness throughout gait although improved somewhat w/ use of fww. cues for R incr step length w/ R step to noted; cues to remain w/in fww w/ fww held as pt pushed it fwd.  -PH               User Key  (r) = Recorded By, (t) = Taken By, (c) = Cosigned By      Initials Name Provider Type    Denita Olivarez PTA Physical Therapist Assistant                   Obj/Interventions       Row Name 04/16/24 1214          Motor Skills    Therapeutic Exercise other (see comments)  standing march x 10 reps  -PH       Row Name 04/16/24 1214          Balance    Balance Assessment sitting static balance;standing static balance;sitting dynamic balance  -PH     Static Sitting Balance standby assist  -PH     Static Standing Balance contact guard;minimal assist;2-person assist;verbal cues  -PH     Position/Device Used, Standing  Balance walker, front-wheeled;supported  -PH               User Key  (r) = Recorded By, (t) = Taken By, (c) = Cosigned By      Initials Name Provider Type    Denita Olivarez PTA Physical Therapist Assistant                   Goals/Plan    No documentation.                  Clinical Impression       Row Name 04/16/24 1215          Pain    Pretreatment Pain Rating 0/10 - no pain  -PH     Posttreatment Pain Rating 0/10 - no pain  -PH       Row Name 04/16/24 1215          Plan of Care Review    Plan of Care Reviewed With patient  -PH     Progress improving  -PH     Outcome Evaluation Pt was seen by PT for tx. Tablet  was use for communication. Pt still reporting dizziness and vision deficits w/ eye patch in place throughout session.   Pt sat up to EOB w/ SBA. Pt stood 2x req min A x 2 w/ use of fww or HHA x 2. Pt amb approx 7' fwd/bkwd req min/mod A x 2 w/ HHA x 2. Pt trialed use of fww and amb 35' req min A x 2. Pt was ataxic and unsteady throughout amb although improved somewhat w/ use of fww. R step to pattern noted. Pt was cued several times to remain within fww. Pt was UIC at end of session.  -PH       Row Name 04/16/24 1215          Vital Signs    O2 Delivery Pre Treatment room air  -PH     O2 Delivery Intra Treatment room air  -PH     O2 Delivery Post Treatment room air  -PH       Row Name 04/16/24 1215          Positioning and Restraints    Pre-Treatment Position in bed  -PH     Post Treatment Position chair  -PH     In Chair sitting;with OT  -PH               User Key  (r) = Recorded By, (t) = Taken By, (c) = Cosigned By      Initials Name Provider Type    Denita Olivarez PTA Physical Therapist Assistant                   Outcome Measures       Row Name 04/16/24 1224 04/16/24 0800       How much help from another person do you currently need...    Turning from your back to your side while in flat bed without using bedrails? 4  -PH 4  -LH    Moving from lying on back to sitting  on the side of a flat bed without bedrails? 3  -PH 3  -LH    Moving to and from a bed to a chair (including a wheelchair)? 2  -PH 2  -LH    Standing up from a chair using your arms (e.g., wheelchair, bedside chair)? 2  -PH 2  -LH    Climbing 3-5 steps with a railing? 1  -PH 1  -LH    To walk in hospital room? 2  -PH 2  -LH    AM-PAC 6 Clicks Score (PT) 14  -PH 14  -LH    Highest Level of Mobility Goal 4 --> Transfer to chair/commode  -PH 4 --> Transfer to chair/commode  -LH      Row Name 04/16/24 1224          Functional Assessment    Outcome Measure Options AM-PAC 6 Clicks Basic Mobility (PT)  -PH               User Key  (r) = Recorded By, (t) = Taken By, (c) = Cosigned By      Initials Name Provider Type    PH Denita Bell, PTA Physical Therapist Assistant     Patricia Mahan, RN Registered Nurse                                 Physical Therapy Education       Title: PT OT SLP Therapies (In Progress)       Topic: Physical Therapy (In Progress)       Point: Mobility training (In Progress)       Learning Progress Summary             Patient Acceptance, E,TB, NR by PH at 4/16/2024 1225    Acceptance, E, NR by AR at 4/15/2024 1419    Acceptance, E, VU by EM at 4/13/2024 1653    Acceptance, E, VU by JG at 4/12/2024 1326    Acceptance, E,I, NR by DJ at 4/11/2024 1033                         Point: Home exercise program (In Progress)       Learning Progress Summary             Patient Acceptance, E,TB, NR by PH at 4/16/2024 1225    Acceptance, E, NR by AR at 4/15/2024 1419    Acceptance, E, VU by JG at 4/12/2024 1326                         Point: Body mechanics (In Progress)       Learning Progress Summary             Patient Acceptance, E,TB, NR by PH at 4/16/2024 1225    Acceptance, E, NR by AR at 4/15/2024 1419    Acceptance, E, VU by JG at 4/12/2024 1326    Acceptance, E,I, NR by DJ at 4/11/2024 1033                         Point: Precautions (In Progress)       Learning Progress Summary             Patient  Acceptance, E,TB, NR by  at 4/16/2024 1225    Acceptance, E, NR by AR at 4/15/2024 1419    Acceptance, E, VU by JDINORAH at 4/12/2024 1326    Acceptance, E,I, NR by DJ at 4/11/2024 1033                                         User Key       Initials Effective Dates Name Provider Type Discipline    EM 06/16/21 -  Carolyn Schofield, PT Physical Therapist PT    AR 06/16/21 -  Traci Armstrong, PT Physical Therapist PT    PH 06/16/21 -  Denita Bell PTA Physical Therapist Assistant PT    DJ 10/25/19 -  Amira Gonzalez, PT Physical Therapist PT    JG 02/29/24 -  Reynaldo Lindsey, PT Student PT Student PT                  PT Recommendation and Plan     Plan of Care Reviewed With: patient  Progress: improving  Outcome Evaluation: Pt was seen by PT for tx. Tablet  was use for communication. Pt still reporting dizziness and vision deficits w/ eye patch in place throughout session.   Pt sat up to EOB w/ SBA. Pt stood 2x req min A x 2 w/ use of fww or HHA x 2. Pt amb approx 7' fwd/bkwd req min/mod A x 2 w/ HHA x 2. Pt trialed use of fww and amb 35' req min A x 2. Pt was ataxic and unsteady throughout amb although improved somewhat w/ use of fww. R step to pattern noted. Pt was cued several times to remain within fww. Pt was UIC at end of session.     Time Calculation:         PT Charges       Row Name 04/16/24 1225             Time Calculation    Start Time 1113  -PH      Stop Time 1130  -PH      Time Calculation (min) 17 min  -PH      PT Received On 04/16/24  -PH      PT - Next Appointment 04/17/24  -PH         Timed Charges    07089 - PT Therapeutic Activity Minutes 17  -PH         Total Minutes    Timed Charges Total Minutes 17  -PH       Total Minutes 17  -PH                User Key  (r) = Recorded By, (t) = Taken By, (c) = Cosigned By      Initials Name Provider Type    PH Denita Bell, PTA Physical Therapist Assistant                  Therapy Charges for Today       Code Description Service Date  Service Provider Modifiers Qty    [id number]  PT THERAPEUTIC ACT EA 15 MIN 4/16/2024 Denita Bell PTA GP 1            PT G-Codes  Outcome Measure Options: AM-PAC 6 Clicks Basic Mobility (PT)  AM-PAC 6 Clicks Score (PT): 14  AM-PAC 6 Clicks Score (OT): 13  Modified [place] Scale: 4 - Moderately severe disability.  Unable to walk without assistance, and unable to attend to own bodily needs without assistance.  PT Discharge Summary  Anticipated Discharge Disposition (PT): inpatient rehabilitation facility, home with 24/7 care    Denita Bell PTA  4/16/2024

## 2024-04-16 NOTE — PLAN OF CARE
Goal Outcome Evaluation:              Outcome Evaluation: Repeat VFSS completed. iPad  utilized for [place]. Debo OCHOA, present during the study. Slightly improved when compared to previous study on 4/11/24, however, suspect swallow continues to be impacted by mental status. Patient presents with mild oropharyngeal dysphagia. No aspiration visualized on study. x1 instance of trace nontransient penetration with thin liquid by cup suspect secondary to large drink size. No penetration with remaining trials of thin liquid by spoon/cup/straw, nectar by spoon/cup/straw, puree, or soft/chopped solid trials. Double swallows and liquid washes utilized to clear mild-moderate oropharyngeal residue. Disorganized and poor munching mastication with soft/chopped solid. Recommend puree diet with nectar thick liquids. Meds whole or crushed in puree.  Free water protocol with ice/sips of water between meals, WITH SUPERVISION. Sitting upright, slow rate, small bites/sips, alternate puree/liquids, 1:1 supervision during meals with assistance if impulsivity observed. Per SLP discretion, patient is appropriate for upgrade at the bedside if oral stage, mental status, and impulsivity improves.                 SLP Swallowing Diagnosis: mild, oral dysphagia, pharyngeal dysphagia (04/16/24 1100)

## 2024-04-16 NOTE — PLAN OF CARE
Goal Outcome Evaluation:  Plan of Care Reviewed With: patient        Progress: no change  Outcome Evaluation: VSS on RA. Aox4. NIH 3 for LUE ataxia, best gaze, and dysarthria. Denies pain. Barium swallow completed, pt remains on pureed diet, liquids upgraded to nectar thick. Impulsive. Unsteady gait. Assist x2. Bed alarm on.

## 2024-04-17 ENCOUNTER — APPOINTMENT (OUTPATIENT)
Dept: CARDIOLOGY | Facility: HOSPITAL | Age: 54
End: 2024-04-17

## 2024-04-17 ENCOUNTER — READMISSION MANAGEMENT (OUTPATIENT)
Dept: CALL CENTER | Facility: HOSPITAL | Age: 54
End: 2024-04-17

## 2024-04-17 VITALS
DIASTOLIC BLOOD PRESSURE: 73 MMHG | HEART RATE: 74 BPM | HEIGHT: 67 IN | OXYGEN SATURATION: 98 % | TEMPERATURE: 98.2 F | BODY MASS INDEX: 30.8 KG/M2 | SYSTOLIC BLOOD PRESSURE: 129 MMHG | RESPIRATION RATE: 16 BRPM | WEIGHT: 196.21 LBS

## 2024-04-17 LAB
ANION GAP SERPL CALCULATED.3IONS-SCNC: 10.7 MMOL/L (ref 5–15)
BASOPHILS # BLD AUTO: 0.09 10*3/MM3 (ref 0–0.2)
BASOPHILS NFR BLD AUTO: 0.8 % (ref 0–1.5)
BUN SERPL-MCNC: 18 MG/DL (ref 6–20)
BUN/CREAT SERPL: 23.4 (ref 7–25)
CALCIUM SPEC-SCNC: 9.2 MG/DL (ref 8.6–10.5)
CHLORIDE SERPL-SCNC: 103 MMOL/L (ref 98–107)
CO2 SERPL-SCNC: 24.3 MMOL/L (ref 22–29)
CREAT SERPL-MCNC: 0.77 MG/DL (ref 0.76–1.27)
DEPRECATED RDW RBC AUTO: 40.9 FL (ref 37–54)
EGFRCR SERPLBLD CKD-EPI 2021: 108.4 ML/MIN/1.73
EOSINOPHIL # BLD AUTO: 0.19 10*3/MM3 (ref 0–0.4)
EOSINOPHIL NFR BLD AUTO: 1.6 % (ref 0.3–6.2)
ERYTHROCYTE [DISTWIDTH] IN BLOOD BY AUTOMATED COUNT: 11.9 % (ref 12.3–15.4)
GLUCOSE SERPL-MCNC: 112 MG/DL (ref 65–99)
HCT VFR BLD AUTO: 41.6 % (ref 37.5–51)
HGB BLD-MCNC: 14.2 G/DL (ref 13–17.7)
IMM GRANULOCYTES # BLD AUTO: 0.07 10*3/MM3 (ref 0–0.05)
IMM GRANULOCYTES NFR BLD AUTO: 0.6 % (ref 0–0.5)
LYMPHOCYTES # BLD AUTO: 1.8 10*3/MM3 (ref 0.7–3.1)
LYMPHOCYTES NFR BLD AUTO: 15.5 % (ref 19.6–45.3)
MCH RBC QN AUTO: 32 PG (ref 26.6–33)
MCHC RBC AUTO-ENTMCNC: 34.1 G/DL (ref 31.5–35.7)
MCV RBC AUTO: 93.7 FL (ref 79–97)
MONOCYTES # BLD AUTO: 1.37 10*3/MM3 (ref 0.1–0.9)
MONOCYTES NFR BLD AUTO: 11.8 % (ref 5–12)
NEUTROPHILS NFR BLD AUTO: 69.7 % (ref 42.7–76)
NEUTROPHILS NFR BLD AUTO: 8.1 10*3/MM3 (ref 1.7–7)
NRBC BLD AUTO-RTO: 0 /100 WBC (ref 0–0.2)
PLATELET # BLD AUTO: 414 10*3/MM3 (ref 140–450)
PMV BLD AUTO: 8.6 FL (ref 6–12)
POTASSIUM SERPL-SCNC: 4.2 MMOL/L (ref 3.5–5.2)
RBC # BLD AUTO: 4.44 10*6/MM3 (ref 4.14–5.8)
SODIUM SERPL-SCNC: 138 MMOL/L (ref 136–145)
WBC NRBC COR # BLD AUTO: 11.62 10*3/MM3 (ref 3.4–10.8)

## 2024-04-17 PROCEDURE — 80048 BASIC METABOLIC PNL TOTAL CA: CPT | Performed by: INTERNAL MEDICINE

## 2024-04-17 PROCEDURE — 93246 EXT ECG>7D<15D RECORDING: CPT

## 2024-04-17 PROCEDURE — 85025 COMPLETE CBC W/AUTO DIFF WBC: CPT | Performed by: INTERNAL MEDICINE

## 2024-04-17 RX ORDER — ASPIRIN 81 MG/1
81 TABLET, CHEWABLE ORAL DAILY
Qty: 30 TABLET | Refills: 0 | Status: SHIPPED | OUTPATIENT
Start: 2024-04-18

## 2024-04-17 RX ORDER — FOLIC ACID 1 MG/1
1 TABLET ORAL DAILY
Qty: 30 TABLET | Refills: 0 | Status: SHIPPED | OUTPATIENT
Start: 2024-04-18

## 2024-04-17 RX ORDER — ATORVASTATIN CALCIUM 80 MG/1
80 TABLET, FILM COATED ORAL NIGHTLY
Qty: 30 TABLET | Refills: 0 | Status: SHIPPED | OUTPATIENT
Start: 2024-04-17

## 2024-04-17 RX ORDER — AMLODIPINE BESYLATE 10 MG/1
10 TABLET ORAL
Qty: 30 TABLET | Refills: 0 | Status: SHIPPED | OUTPATIENT
Start: 2024-04-18

## 2024-04-17 RX ORDER — LANOLIN ALCOHOL/MO/W.PET/CERES
100 CREAM (GRAM) TOPICAL DAILY
Qty: 30 TABLET | Refills: 0 | Status: SHIPPED | OUTPATIENT
Start: 2024-04-18

## 2024-04-17 RX ORDER — CLOPIDOGREL BISULFATE 75 MG/1
75 TABLET ORAL DAILY
Qty: 30 TABLET | Refills: 0 | Status: SHIPPED | OUTPATIENT
Start: 2024-04-18

## 2024-04-17 RX ADMIN — Medication 1 TABLET: at 08:25

## 2024-04-17 RX ADMIN — Medication 100 MG: at 08:25

## 2024-04-17 RX ADMIN — CLOPIDOGREL BISULFATE 75 MG: 75 TABLET, FILM COATED ORAL at 08:25

## 2024-04-17 RX ADMIN — FOLIC ACID 1 MG: 1 TABLET ORAL at 08:25

## 2024-04-17 RX ADMIN — AMLODIPINE BESYLATE 10 MG: 10 TABLET ORAL at 08:25

## 2024-04-17 RX ADMIN — ASPIRIN 81 MG: 81 TABLET, CHEWABLE ORAL at 08:25

## 2024-04-17 NOTE — DISCHARGE INSTR - APPOINTMENTS
Follow up Primary Care Appointment   [facility]    APPOINTMENT  May 17, 2024 at 1:00 PM    PROVIDER  GABRIELA Serrano    LOCATION  [address] [place] [address]    PHONE  [phone]

## 2024-04-17 NOTE — PROGRESS NOTES
Case Management Discharge Note      Final Note: Pt discharging home. Discharge prescriptions filled via [facility]. Follow up Primary Care appointment scheduled at [facility] with sliding scale fee services for the unisured, appointment reminder information in AVS (Appointment: May 17, 2024 at 1:00 PM; Provider: GABRIELA Serrano; Location: [address] [place] [address], [place] [address]; Phone: [phone]). [employer] supplied walker at bedside per PT recommendation. Pt has no insurance so any follow up therapy/care will be private pay. [place] LCSW         Selected Continued Care - Admitted Since 4/6/2024       Destination    No services have been selected for the patient.                Durable Medical Equipment    No services have been selected for the patient.                Dialysis/Infusion    No services have been selected for the patient.                Home Medical Care    No services have been selected for the patient.                Therapy    No services have been selected for the patient.                Community Resources    No services have been selected for the patient.                Community & DME    No services have been selected for the patient.                    Transportation Services  Private: Car    Final Discharge Disposition Code: 01 - home or self-care

## 2024-04-17 NOTE — DISCHARGE SUMMARY
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 on 04-10-24 at 0659    CT Angiogram Head [[id number]] Collected: 04/09/24 1725     Updated: 04/09/24 1813    Addenda:        Proximal and mid left PICA is patent. Distal left PICA is poorly  opacified and may be occluded.     This report was finalized on 4/9/2024 6:10 PM by Dr. Eder Rebolledo M.D on Workstation: [id number]     Signed: 04/09/24 1810 by Eder Rebolledo MD    Narrative:      CT ANGIOGRAM HEAD-, CT CEREBRAL PERFUSION W WO CONTRAST-, CT ANGIOGRAM  NECK-     CLINICAL HISTORY: Clinical concern for stroke.     TECHNIQUE: CT angiogram of the head and neck was obtained with 1 mm  axial images following the administration of IV contrast. Sagittal,  coronal, and 3-dimensional reconstructed images were obtained.  Additionally, a CT scan of the head was obtained with 3 mm axial images  before and after the administration of IV contrast. Perfusion imaging  was also performed.     COMPARISON: CT head 4/6/2024     FINDINGS:     Noncontrast CT: New hypoattenuation throughout the majority of the left  cerebellar lobe and entire cerebellar vermis. Some sparing anteriorly at  the mid and inferior left cerebellum. Positive mass effect with severe  crowding of the posterior fossa. Cerebellar tonsils remain above  the  foramen magnum. No evidence of herniation, including ascending  transtentorial. Mass effect on the fourth ventricle with new dilation of  the lateral and third ventricles. Mild periventricular hypoattenuation  adjacent to the temporal horns. No hemorrhagic transformation. No  extra-axial collections.     CTA NECK: Short segment nonocclusive linear filling defect in the  proximal left vertebral artery just after the origin (series 8/images 92  through 97). Remainder of the left cervical vertebral arteries patent.     Proximal and mid right cervical vertebral artery is completely  unopacified. Right cervical vertebral artery begins to opacify at the  level of the C3 transverse foramen with a somewhat water paint  appearance. Distal right V2 and V3 segments are opacified.      Patent bilateral common and internal carotid arteries. No evidence of  dissection.     CTA HEAD: Left dominant V4. Distal right V4 is poorly opacified (series  8/image 230). Improved opacification of the right V4 on delayed imaging.  Patent bilateral PICA. Patent basilar artery. AICA are not well seen. .  Occluded left superior cerebellar artery. Patent right superior  cerebellar artery. Diminutive left P1 with robust left P-comm. Patent  bilateral PCAs.     Patent bilateral anterior circulation. No evidence of aneurysm.     Perfusion: Perfusion is limited by motion and arterial bolus. Matched  perfusion defect throughout much of the left cerebellum.          Impression:      1. New early subacute infarct involving the cerebellar vermis and  majority of the left cerebellar lobe. Resulting severe crowding of the  posterior fossa with mass effect on the fourth ventricle. New  obstructive hydrocephalus involving the lateral and third ventricles. No  hemorrhagic transformation. No herniation.  2. Occluded left superior cerebellar artery. Patent PICA and basilar  artery.   3. Poorly opacified nondominant contralateral right V4 with  improved  opacification on more delayed imaging, favoring pseudoocclusion on  angiographic phase likely secondary to posterior fossa crowding and mass  effect.  4. The proximal and mid right cervical vertebral artery is completely  unopacified. Right cervical vertebral artery begins to opacify at the  level of the right C3 transverse foramen. Differential considerations  include pseudoocclusion (favored) versus occlusion.  5. Short segment nonocclusive linear filling defect in the proximal left  cervical vertebral artery just after its origin. Differential  considerations include nonocclusive thrombus versus artifact versus less  likely dissection.     Above findings were discussed with Dr. Marks at the time of  dictation.     Radiation dose reduction techniques were utilized, including automated  exposure control and exposure modulation based on body size.     This report was finalized on 4/9/2024 5:45 PM by Dr. Eder Rebolledo M.D on Workstation: [id number]       CT Angiogram Neck [[id number]] Collected: 04/09/24 1725     Updated: 04/09/24 1813    Addenda:        Proximal and mid left PICA is patent. Distal left PICA is poorly  opacified and may be occluded.     This report was finalized on 4/9/2024 6:10 PM by Dr. Eder Rebolledo M.D on Workstation: [id number]     Signed: 04/09/24 1810 by Eder Rebolledo MD    Narrative:      CT ANGIOGRAM HEAD-, CT CEREBRAL PERFUSION W WO CONTRAST-, CT ANGIOGRAM  NECK-     CLINICAL HISTORY: Clinical concern for stroke.     TECHNIQUE: CT angiogram of the head and neck was obtained with 1 mm  axial images following the administration of IV contrast. Sagittal,  coronal, and 3-dimensional reconstructed images were obtained.  Additionally, a CT scan of the head was obtained with 3 mm axial images  before and after the administration of IV contrast. Perfusion imaging  was also performed.     COMPARISON: CT head 4/6/2024     FINDINGS:     Noncontrast CT: New  hypoattenuation throughout the majority of the left  cerebellar lobe and entire cerebellar vermis. Some sparing anteriorly at  the mid and inferior left cerebellum. Positive mass effect with severe  crowding of the posterior fossa. Cerebellar tonsils remain above the  foramen magnum. No evidence of herniation, including ascending  transtentorial. Mass effect on the fourth ventricle with new dilation of  the lateral and third ventricles. Mild periventricular hypoattenuation  adjacent to the temporal horns. No hemorrhagic transformation. No  extra-axial collections.     CTA NECK: Short segment nonocclusive linear filling defect in the  proximal left vertebral artery just after the origin (series 8/images 92  through 97). Remainder of the left cervical vertebral arteries patent.     Proximal and mid right cervical vertebral artery is completely  unopacified. Right cervical vertebral artery begins to opacify at the  level of the C3 transverse foramen with a somewhat water paint  appearance. Distal right V2 and V3 segments are opacified.      Patent bilateral common and internal carotid arteries. No evidence of  dissection.     CTA HEAD: Left dominant V4. Distal right V4 is poorly opacified (series  8/image 230). Improved opacification of the right V4 on delayed imaging.  Patent bilateral PICA. Patent basilar artery. AICA are not well seen. .  Occluded left superior cerebellar artery. Patent right superior  cerebellar artery. Diminutive left P1 with robust left P-comm. Patent  bilateral PCAs.     Patent bilateral anterior circulation. No evidence of aneurysm.     Perfusion: Perfusion is limited by motion and arterial bolus. Matched  perfusion defect throughout much of the left cerebellum.          Impression:      1. New early subacute infarct involving the cerebellar vermis and  majority of the left cerebellar lobe. Resulting severe crowding of the  posterior fossa with mass effect on the fourth ventricle.  New  obstructive hydrocephalus involving the lateral and third ventricles. No  hemorrhagic transformation. No herniation.  2. Occluded left superior cerebellar artery. Patent PICA and basilar  artery.   3. Poorly opacified nondominant contralateral right V4 with improved  opacification on more delayed imaging, favoring pseudoocclusion on  angiographic phase likely secondary to posterior fossa crowding and mass  effect.  4. The proximal and mid right cervical vertebral artery is completely  unopacified. Right cervical vertebral artery begins to opacify at the  level of the right C3 transverse foramen. Differential considerations  include pseudoocclusion (favored) versus occlusion.  5. Short segment nonocclusive linear filling defect in the proximal left  cervical vertebral artery just after its origin. Differential  considerations include nonocclusive thrombus versus artifact versus less  likely dissection.     Above findings were discussed with Dr. Marks at the time of  dictation.     Radiation dose reduction techniques were utilized, including automated  exposure control and exposure modulation based on body size.     This report was finalized on 4/9/2024 5:45 PM by Dr. Eder Rebolledo M.D on Workstation: [id number]       CT CEREBRAL PERFUSION WITH & WITHOUT CONTRAST [[id number]] Collected: 04/09/24 1725     Updated: 04/09/24 1813    Addenda:        Proximal and mid left PICA is patent. Distal left PICA is poorly  opacified and may be occluded.     This report was finalized on 4/9/2024 6:10 PM by Dr. Eder Rebolledo M.D on Workstation: [id number]     Signed: 04/09/24 1810 by Eder Rebolledo MD    Narrative:      CT ANGIOGRAM HEAD-, CT CEREBRAL PERFUSION W WO CONTRAST-, CT ANGIOGRAM  NECK-     CLINICAL HISTORY: Clinical concern for stroke.     TECHNIQUE: CT angiogram of the head and neck was obtained with 1 mm  axial images following the administration of IV contrast. Sagittal,  coronal, and  3-dimensional reconstructed images were obtained.  Additionally, a CT scan of the head was obtained with 3 mm axial images  before and after the administration of IV contrast. Perfusion imaging  was also performed.     COMPARISON: CT head 4/6/2024     FINDINGS:     Noncontrast CT: New hypoattenuation throughout the majority of the left  cerebellar lobe and entire cerebellar vermis. Some sparing anteriorly at  the mid and inferior left cerebellum. Positive mass effect with severe  crowding of the posterior fossa. Cerebellar tonsils remain above the  foramen magnum. No evidence of herniation, including ascending  transtentorial. Mass effect on the fourth ventricle with new dilation of  the lateral and third ventricles. Mild periventricular hypoattenuation  adjacent to the temporal horns. No hemorrhagic transformation. No  extra-axial collections.     CTA NECK: Short segment nonocclusive linear filling defect in the  proximal left vertebral artery just after the origin (series 8/images 92  through 97). Remainder of the left cervical vertebral arteries patent.     Proximal and mid right cervical vertebral artery is completely  unopacified. Right cervical vertebral artery begins to opacify at the  level of the C3 transverse foramen with a somewhat water paint  appearance. Distal right V2 and V3 segments are opacified.      Patent bilateral common and internal carotid arteries. No evidence of  dissection.     CTA HEAD: Left dominant V4. Distal right V4 is poorly opacified (series  8/image 230). Improved opacification of the right V4 on delayed imaging.  Patent bilateral PICA. Patent basilar artery. AICA are not well seen. .  Occluded left superior cerebellar artery. Patent right superior  cerebellar artery. Diminutive left P1 with robust left P-comm. Patent  bilateral PCAs.     Patent bilateral anterior circulation. No evidence of aneurysm.     Perfusion: Perfusion is limited by motion and arterial bolus.  Matched  perfusion defect throughout much of the left cerebellum.          Impression:      1. New early subacute infarct involving the cerebellar vermis and  majority of the left cerebellar lobe. Resulting severe crowding of the  posterior fossa with mass effect on the fourth ventricle. New  obstructive hydrocephalus involving the lateral and third ventricles. No  hemorrhagic transformation. No herniation.  2. Occluded left superior cerebellar artery. Patent PICA and basilar  artery.   3. Poorly opacified nondominant contralateral right V4 with improved  opacification on more delayed imaging, favoring pseudoocclusion on  angiographic phase likely secondary to posterior fossa crowding and mass  effect.  4. The proximal and mid right cervical vertebral artery is completely  unopacified. Right cervical vertebral artery begins to opacify at the  level of the right C3 transverse foramen. Differential considerations  include pseudoocclusion (favored) versus occlusion.  5. Short segment nonocclusive linear filling defect in the proximal left  cervical vertebral artery just after its origin. Differential  considerations include nonocclusive thrombus versus artifact versus less  likely dissection.     Above findings were discussed with Dr. Marks at the time of  dictation.     Radiation dose reduction techniques were utilized, including automated  exposure control and exposure modulation based on body size.     This report was finalized on 4/9/2024 5:45 PM by Dr. Eder Rebolledo M.D on Workstation: [id number]       CT Head Without Contrast [[id number]] Collected: 04/06/24 2308     Updated: 04/06/24 2308    Narrative:        Patient: SYLVIE DICKINSON  Time Out: 23:07  Exam(s): CT HEAD Without Contrast     EXAM:    CT Head Without Intravenous Contrast    CLINICAL HISTORY:     Reason for exam: AMS.    TECHNIQUE:    Axial computed tomography images of the head brain without intravenous   contrast.  CTDI is 55.18 mGy and DLP is  1028.2 mGy-cm.  This CT exam was   performed according to the principle of ALARA (As Low As Reasonably   Achievable) by using one or more of the following dose reduction   techniques: automated exposure control, adjustment of the mA and or kV   according to patient size, and or use of iterative reconstruction   technique.    COMPARISON:    None.    FINDINGS:    Brain: No mass effect or acute infarct. No acute hemorrhage.  No   abnormal density in the brain parenchyma.  Normal exam for this age group.      Ventricles:   No hydrocephalus or midline shift.    Bones joints:   No skull fracture.    Soft tissues: No scalp hematoma.    Sinuses: Clear.    Mastoid air cells:   No mastoid effusion.    IMPRESSION:       1.  No acute intracranial abnormality.      Impression:          Electronically signed by Alena Mitchell M.D. on 04-06-24 at 2307    XR Chest 1 View [[id number]] Collected: 04/06/24 2257     Updated: 04/06/24 2302    Narrative:      Portable chest radiograph     HISTORY: Alcohol intoxication, aspiration     TECHNIQUE: Single AP portable radiograph of the chest     COMPARISON: None       Impression:      FINDINGS AND IMPRESSION:  Lungs are hypoinflated. Pulmonary vascular congestion is present.  Cardiac silhouette is accentuated by low lung volumes. There is  bibasilar pulmonary opacification suggestive of atelectasis, pneumonia  and/or pulmonary edema in the appropriate clinical context correlation  with patient history is recommended follow-up chest CT if clinically  indicated. No pneumothorax is seen..     This report was finalized on 4/6/2024 10:59 PM by Dr. Stephen De La Cruz M.D  on Workstation: [id number]               Results for orders placed during the hospital encounter of 04/06/24    Adult Transthoracic Echo Complete W/ Cont if Necessary Per Protocol    Interpretation Summary    Left ventricular systolic function is hyperdynamic (EF > 70%). Calculated left ventricular EF = 70.8%    Left ventricular  wall thickness is consistent with mild concentric hypertrophy.    Left ventricular diastolic function was normal.    Pertinent Labs     Results from last 7 days   Lab Units 04/17/24  0445 04/16/24  0546 04/15/24  0656 04/14/24  0749   WBC 10*3/mm3 11.62* 9.60 10.89* 10.39   HEMOGLOBIN g/dL 14.2 14.9 14.5 14.4   PLATELETS 10*3/mm3 414 383 401 335     Results from last 7 days   Lab Units 04/17/24  0445 04/16/24  0546 04/15/24  2337 04/15/24  1800 04/15/24  1157 04/15/24  0752   SODIUM mmol/L 138 138  138 136 137  138   < > 137   POTASSIUM mmol/L 4.2 4.1  --  4.1  --  4.0   CHLORIDE mmol/L 103 101  --  102  --  102   CO2 mmol/L 24.3 24.4  --  24.4  --  24.3   BUN mg/dL 18 17  --  16  --  16   CREATININE mg/dL 0.77 0.81  --  0.82  --  0.76   GLUCOSE mg/dL 112* 96  --  97  --  97   EGFR mL/min/1.73 108.4 106.7  --  106.4  --  108.8    < > = values in this interval not displayed.       Results from last 7 days   Lab Units 04/17/24 0445 04/16/24  0546 04/15/24  1800 04/15/24  0752   CALCIUM mg/dL 9.2 9.3 9.1 9.0       Results from last 7 days   Lab Units 04/11/24  1149   D DIMER QUANT MCGFEU/mL <0.27       Results from last 7 days   Lab Units 04/11/24  0521   CHOLESTEROL mg/dL 214*   TRIGLYCERIDES mg/dL 134   HDL CHOL mg/dL 52   LDL CHOL mg/dL 138*             Test Results Pending at Discharge       Discharge Details        Discharge Medications        New Medications        Instructions Start Date   amLODIPine 10 MG tablet  Commonly known as: NORVASC   10 mg, Oral, Every 24 Hours Scheduled   Start Date: April 18, 2024     Aspirin Low Dose 81 MG chewable tablet  Generic drug: aspirin   81 mg, Oral, Daily   Start Date: April 18, 2024     atorvastatin 80 MG tablet  Commonly known as: LIPITOR   80 mg, Oral, Nightly      clopidogrel 75 MG tablet  Commonly known as: PLAVIX   75 mg, Oral, Daily   Start Date: April 18, 2024     folic acid 1 MG tablet  Commonly known as: FOLVITE   1 mg, Oral, Daily   Start Date: April 18,  2024     thiamine 100 MG tablet  Commonly known as: VITAMIN B1   100 mg, Oral, Daily   Start Date: April 18, 2024              No Known Allergies    Discharge Disposition:  Home or Self Care      Discharge Diet:  No active diet order      Discharge Activity:   Activity Instructions       Activity as Tolerated              CODE STATUS:    Code Status and Medical Interventions:   Ordered at: 04/07/24 0522     Code Status (Patient has no pulse and is not breathing):    CPR (Attempt to Resuscitate)     Medical Interventions (Patient has pulse or is breathing):    Full Support       Future Appointments   Date Time Provider Department Center   5/13/2024 10:30 AM Martin Whitney MD [facility] None   6/3/2024 11:00 AM Jimi Quinones MD [facility]     Additional Instructions for the Follow-ups that You Need to Schedule       Ambulatory Referral to Sleep Medicine   As directed      Follow-up needed: Yes        Discharge Follow-up with PCP   As directed       Currently Documented PCP:    System, Provider Not In    PCP Phone Number:    None     Follow Up Details: 1 week        Discharge Follow-up with Specified Provider: ; 1 Month   As directed      To:    Follow Up: 1 Month        Discharge Follow-up with Specified Provider: ; 1 Month   As directed      To:    Follow Up: 1 Month        CT Angiogram Head   Jun 19, 2024      Release to patient: Routine Release        CT Angiogram Neck With & Without Contrast   Jun 19, 2024      Release to patient: Routine Release               Follow-up Information       System, Provider Not In .    Contact information:  [facility] [id number]                             Additional Instructions for the Follow-ups that You Need to Schedule       Ambulatory Referral to Sleep Medicine   As directed      Follow-up needed: Yes        Discharge Follow-up with PCP   As directed       Currently Documented PCP:    System,  Provider Not In    PCP Phone Number:    None     Follow Up Details: 1 week        Discharge Follow-up with Specified Provider: ; 1 Month   As directed      To:    Follow Up: 1 Month        Discharge Follow-up with Specified Provider: ; 1 Month   As directed      To:    Follow Up: 1 Month        CT Angiogram Head   Jun 19, 2024      Release to patient: Routine Release        CT Angiogram Neck With & Without Contrast   Jun 19, 2024      Release to patient: Routine Release            Time Spent on Discharge:  Greater than 30 minutes      Ted Gonzalez MD  [facility]  04/17/24  16:39 EDT

## 2024-04-18 NOTE — OUTREACH NOTE
Prep Survey      Flowsheet Row Responses   [facility] patient discharged from? [place]   Is LACE score < 7 ? No   Eligibility Not Eligible   What are the reasons patient is not eligible? Other  [etoh w/d]   Does the patient have one of the following disease processes/diagnoses(primary or secondary)? Other   Prep survey completed? Yes            ANTHONY EAGLE - Registered Nurse

## 2024-06-03 ENCOUNTER — TELEPHONE (OUTPATIENT)
Dept: NEUROLOGY | Facility: CLINIC | Age: 54
End: 2024-06-03

## 2024-06-03 NOTE — TELEPHONE ENCOUNTER
Called patient with . Unable to leave VM. Dr. Quinones out of the clinic today. Pt needs to be rescheduled.

## 2024-12-29 ENCOUNTER — APPOINTMENT (OUTPATIENT)
Dept: MRI IMAGING | Facility: HOSPITAL | Age: 54
End: 2024-12-29

## 2024-12-29 ENCOUNTER — HOSPITAL ENCOUNTER (OUTPATIENT)
Facility: HOSPITAL | Age: 54
Setting detail: OBSERVATION
Discharge: HOME | End: 2024-12-31
Attending: EMERGENCY MEDICINE | Admitting: EMERGENCY MEDICINE

## 2024-12-29 ENCOUNTER — APPOINTMENT (OUTPATIENT)
Dept: CT IMAGING | Facility: HOSPITAL | Age: 54
End: 2024-12-29

## 2024-12-29 DIAGNOSIS — R53.1 ACUTE LEFT-SIDED WEAKNESS: Primary | ICD-10-CM

## 2024-12-29 DIAGNOSIS — Z86.73 HISTORY OF CVA (CEREBROVASCULAR ACCIDENT): ICD-10-CM

## 2024-12-29 DIAGNOSIS — I49.9 CARDIAC ARRHYTHMIA, UNSPECIFIED: ICD-10-CM

## 2024-12-29 DIAGNOSIS — F10.20 UNCOMPLICATED ALCOHOL DEPENDENCE: ICD-10-CM

## 2024-12-29 PROBLEM — G45.9 TIA (TRANSIENT ISCHEMIC ATTACK): Status: ACTIVE | Noted: 2024-12-29

## 2024-12-29 LAB
ALBUMIN SERPL-MCNC: 4.4 G/DL (ref 3.5–5.2)
ALBUMIN/GLOB SERPL: 1.5 G/DL
ALP SERPL-CCNC: 153 U/L (ref 39–117)
ALT SERPL W P-5'-P-CCNC: 26 U/L (ref 1–41)
ANION GAP SERPL CALCULATED.3IONS-SCNC: 16 MMOL/L (ref 5–15)
AST SERPL-CCNC: 19 U/L (ref 1–40)
BASOPHILS # BLD AUTO: 0.06 10*3/MM3 (ref 0–0.2)
BASOPHILS NFR BLD AUTO: 0.5 % (ref 0–1.5)
BILIRUB SERPL-MCNC: 0.3 MG/DL (ref 0–1.2)
BUN SERPL-MCNC: 10 MG/DL (ref 6–20)
BUN/CREAT SERPL: 11.5 (ref 7–25)
CALCIUM SPEC-SCNC: 8.8 MG/DL (ref 8.6–10.5)
CHLORIDE SERPL-SCNC: 107 MMOL/L (ref 98–107)
CHOLEST SERPL-MCNC: 158 MG/DL (ref 0–200)
CO2 SERPL-SCNC: 22 MMOL/L (ref 22–29)
CREAT SERPL-MCNC: 0.87 MG/DL (ref 0.76–1.27)
DEPRECATED RDW RBC AUTO: 43.3 FL (ref 37–54)
EGFRCR SERPLBLD CKD-EPI 2021: 103.8 ML/MIN/1.73
EOSINOPHIL # BLD AUTO: 0.09 10*3/MM3 (ref 0–0.4)
EOSINOPHIL NFR BLD AUTO: 0.8 % (ref 0.3–6.2)
ERYTHROCYTE [DISTWIDTH] IN BLOOD BY AUTOMATED COUNT: 12.9 % (ref 12.3–15.4)
ETHANOL BLD-MCNC: 190 MG/DL (ref 0–10)
ETHANOL UR QL: 0.19 %
GLOBULIN UR ELPH-MCNC: 2.9 GM/DL
GLUCOSE BLDC GLUCOMTR-MCNC: 92 MG/DL (ref 70–130)
GLUCOSE BLDC GLUCOMTR-MCNC: 98 MG/DL (ref 70–130)
GLUCOSE SERPL-MCNC: 100 MG/DL (ref 65–99)
HBA1C MFR BLD: 5.8 % (ref 4.8–5.6)
HCT VFR BLD AUTO: 46.1 % (ref 37.5–51)
HDLC SERPL-MCNC: 59 MG/DL (ref 40–60)
HGB BLD-MCNC: 15.9 G/DL (ref 13–17.7)
IMM GRANULOCYTES # BLD AUTO: 0.08 10*3/MM3 (ref 0–0.05)
IMM GRANULOCYTES NFR BLD AUTO: 0.7 % (ref 0–0.5)
LDLC SERPL CALC-MCNC: 77 MG/DL (ref 0–100)
LDLC/HDLC SERPL: 1.25 {RATIO}
LYMPHOCYTES # BLD AUTO: 2.87 10*3/MM3 (ref 0.7–3.1)
LYMPHOCYTES NFR BLD AUTO: 24.2 % (ref 19.6–45.3)
MCH RBC QN AUTO: 31.9 PG (ref 26.6–33)
MCHC RBC AUTO-ENTMCNC: 34.5 G/DL (ref 31.5–35.7)
MCV RBC AUTO: 92.6 FL (ref 79–97)
MONOCYTES # BLD AUTO: 0.92 10*3/MM3 (ref 0.1–0.9)
MONOCYTES NFR BLD AUTO: 7.8 % (ref 5–12)
NEUTROPHILS NFR BLD AUTO: 66 % (ref 42.7–76)
NEUTROPHILS NFR BLD AUTO: 7.85 10*3/MM3 (ref 1.7–7)
NRBC BLD AUTO-RTO: 0 /100 WBC (ref 0–0.2)
PLATELET # BLD AUTO: 408 10*3/MM3 (ref 140–450)
PMV BLD AUTO: 8.8 FL (ref 6–12)
POTASSIUM SERPL-SCNC: 3.4 MMOL/L (ref 3.5–5.2)
PROT SERPL-MCNC: 7.3 G/DL (ref 6–8.5)
QT INTERVAL: 403 MS
QTC INTERVAL: 460 MS
RBC # BLD AUTO: 4.98 10*6/MM3 (ref 4.14–5.8)
SODIUM SERPL-SCNC: 145 MMOL/L (ref 136–145)
TRIGL SERPL-MCNC: 127 MG/DL (ref 0–150)
VLDLC SERPL-MCNC: 22 MG/DL (ref 5–40)
WBC NRBC COR # BLD AUTO: 11.87 10*3/MM3 (ref 3.4–10.8)

## 2024-12-29 PROCEDURE — 25510000001 IOPAMIDOL PER 1 ML: Performed by: EMERGENCY MEDICINE

## 2024-12-29 PROCEDURE — G0378 HOSPITAL OBSERVATION PER HR: HCPCS

## 2024-12-29 PROCEDURE — 80053 COMPREHEN METABOLIC PANEL: CPT | Performed by: EMERGENCY MEDICINE

## 2024-12-29 PROCEDURE — 25010000002 THIAMINE HCL 200 MG/2ML SOLUTION: Performed by: EMERGENCY MEDICINE

## 2024-12-29 PROCEDURE — 82077 ASSAY SPEC XCP UR&BREATH IA: CPT | Performed by: EMERGENCY MEDICINE

## 2024-12-29 PROCEDURE — 70498 CT ANGIOGRAPHY NECK: CPT

## 2024-12-29 PROCEDURE — 82948 REAGENT STRIP/BLOOD GLUCOSE: CPT

## 2024-12-29 PROCEDURE — 83036 HEMOGLOBIN GLYCOSYLATED A1C: CPT | Performed by: EMERGENCY MEDICINE

## 2024-12-29 PROCEDURE — 70496 CT ANGIOGRAPHY HEAD: CPT

## 2024-12-29 PROCEDURE — 96376 TX/PRO/DX INJ SAME DRUG ADON: CPT

## 2024-12-29 PROCEDURE — 70551 MRI BRAIN STEM W/O DYE: CPT

## 2024-12-29 PROCEDURE — 96374 THER/PROPH/DIAG INJ IV PUSH: CPT

## 2024-12-29 PROCEDURE — 85025 COMPLETE CBC W/AUTO DIFF WBC: CPT | Performed by: EMERGENCY MEDICINE

## 2024-12-29 PROCEDURE — 80061 LIPID PANEL: CPT | Performed by: EMERGENCY MEDICINE

## 2024-12-29 PROCEDURE — 99291 CRITICAL CARE FIRST HOUR: CPT

## 2024-12-29 PROCEDURE — 93010 ELECTROCARDIOGRAM REPORT: CPT | Performed by: INTERNAL MEDICINE

## 2024-12-29 PROCEDURE — 93005 ELECTROCARDIOGRAM TRACING: CPT | Performed by: EMERGENCY MEDICINE

## 2024-12-29 RX ORDER — AMLODIPINE BESYLATE 10 MG/1
10 TABLET ORAL
Status: DISCONTINUED | OUTPATIENT
Start: 2024-12-29 | End: 2024-12-31 | Stop reason: HOSPADM

## 2024-12-29 RX ORDER — THIAMINE HYDROCHLORIDE 100 MG/ML
200 INJECTION, SOLUTION INTRAMUSCULAR; INTRAVENOUS EVERY 8 HOURS SCHEDULED
Status: DISCONTINUED | OUTPATIENT
Start: 2024-12-29 | End: 2024-12-31 | Stop reason: HOSPADM

## 2024-12-29 RX ORDER — DIAZEPAM 5 MG/1
10 TABLET ORAL EVERY 8 HOURS
Status: DISCONTINUED | OUTPATIENT
Start: 2024-12-30 | End: 2024-12-31 | Stop reason: HOSPADM

## 2024-12-29 RX ORDER — LORAZEPAM 2 MG/ML
1 INJECTION INTRAMUSCULAR
Status: DISCONTINUED | OUTPATIENT
Start: 2024-12-29 | End: 2024-12-31 | Stop reason: HOSPADM

## 2024-12-29 RX ORDER — DIAZEPAM 5 MG/1
10 TABLET ORAL EVERY 6 HOURS
Status: COMPLETED | OUTPATIENT
Start: 2024-12-29 | End: 2024-12-30

## 2024-12-29 RX ORDER — LORAZEPAM 1 MG/1
1 TABLET ORAL
Status: DISCONTINUED | OUTPATIENT
Start: 2024-12-29 | End: 2024-12-31 | Stop reason: HOSPADM

## 2024-12-29 RX ORDER — LORAZEPAM 2 MG/ML
2 INJECTION INTRAMUSCULAR
Status: DISCONTINUED | OUTPATIENT
Start: 2024-12-29 | End: 2024-12-31 | Stop reason: HOSPADM

## 2024-12-29 RX ORDER — ONDANSETRON 2 MG/ML
4 INJECTION INTRAMUSCULAR; INTRAVENOUS EVERY 6 HOURS PRN
Status: DISCONTINUED | OUTPATIENT
Start: 2024-12-29 | End: 2024-12-31 | Stop reason: HOSPADM

## 2024-12-29 RX ORDER — SODIUM CHLORIDE 0.9 % (FLUSH) 0.9 %
10 SYRINGE (ML) INJECTION AS NEEDED
Status: DISCONTINUED | OUTPATIENT
Start: 2024-12-29 | End: 2024-12-31 | Stop reason: HOSPADM

## 2024-12-29 RX ORDER — FOLIC ACID 1 MG/1
1 TABLET ORAL DAILY
Status: DISCONTINUED | OUTPATIENT
Start: 2024-12-30 | End: 2024-12-31 | Stop reason: HOSPADM

## 2024-12-29 RX ORDER — IOPAMIDOL 755 MG/ML
100 INJECTION, SOLUTION INTRAVASCULAR
Status: COMPLETED | OUTPATIENT
Start: 2024-12-29 | End: 2024-12-29

## 2024-12-29 RX ORDER — CLOPIDOGREL BISULFATE 75 MG/1
75 TABLET ORAL DAILY
Status: DISCONTINUED | OUTPATIENT
Start: 2024-12-29 | End: 2024-12-31

## 2024-12-29 RX ORDER — FOLIC ACID 1 MG/1
1 TABLET ORAL DAILY
Status: DISCONTINUED | OUTPATIENT
Start: 2024-12-29 | End: 2024-12-29

## 2024-12-29 RX ORDER — ATORVASTATIN CALCIUM 80 MG/1
80 TABLET, FILM COATED ORAL NIGHTLY
Status: DISCONTINUED | OUTPATIENT
Start: 2024-12-29 | End: 2024-12-31 | Stop reason: HOSPADM

## 2024-12-29 RX ORDER — SODIUM CHLORIDE 9 MG/ML
40 INJECTION, SOLUTION INTRAVENOUS AS NEEDED
Status: DISCONTINUED | OUTPATIENT
Start: 2024-12-29 | End: 2024-12-31 | Stop reason: HOSPADM

## 2024-12-29 RX ORDER — ASPIRIN 81 MG/1
81 TABLET, CHEWABLE ORAL DAILY
Status: DISCONTINUED | OUTPATIENT
Start: 2024-12-29 | End: 2024-12-31 | Stop reason: HOSPADM

## 2024-12-29 RX ORDER — SODIUM CHLORIDE 0.9 % (FLUSH) 0.9 %
10 SYRINGE (ML) INJECTION EVERY 12 HOURS SCHEDULED
Status: DISCONTINUED | OUTPATIENT
Start: 2024-12-29 | End: 2024-12-31 | Stop reason: HOSPADM

## 2024-12-29 RX ORDER — DIAZEPAM 5 MG/1
10 TABLET ORAL EVERY 12 HOURS
Status: DISCONTINUED | OUTPATIENT
Start: 2024-12-31 | End: 2024-12-31 | Stop reason: HOSPADM

## 2024-12-29 RX ORDER — DIAZEPAM 5 MG/1
10 TABLET ORAL NIGHTLY
Status: DISCONTINUED | OUTPATIENT
Start: 2025-01-01 | End: 2024-12-31 | Stop reason: HOSPADM

## 2024-12-29 RX ORDER — LORAZEPAM 1 MG/1
2 TABLET ORAL
Status: DISCONTINUED | OUTPATIENT
Start: 2024-12-29 | End: 2024-12-31 | Stop reason: HOSPADM

## 2024-12-29 RX ADMIN — IOPAMIDOL 95 ML: 755 INJECTION, SOLUTION INTRAVENOUS at 14:44

## 2024-12-29 RX ADMIN — FOLIC ACID 1 MG: 1 TABLET ORAL at 17:30

## 2024-12-29 RX ADMIN — ASPIRIN 81 MG: 81 TABLET, CHEWABLE ORAL at 21:39

## 2024-12-29 RX ADMIN — ATORVASTATIN CALCIUM 80 MG: 80 TABLET, FILM COATED ORAL at 21:39

## 2024-12-29 RX ADMIN — DIAZEPAM 10 MG: 5 TABLET ORAL at 21:39

## 2024-12-29 RX ADMIN — THIAMINE HYDROCHLORIDE 200 MG: 100 INJECTION, SOLUTION INTRAMUSCULAR; INTRAVENOUS at 21:39

## 2024-12-29 RX ADMIN — Medication 10 ML: at 21:39

## 2024-12-29 RX ADMIN — DIAZEPAM 10 MG: 5 TABLET ORAL at 17:29

## 2024-12-29 RX ADMIN — CLOPIDOGREL BISULFATE 75 MG: 75 TABLET ORAL at 21:39

## 2024-12-29 RX ADMIN — LORAZEPAM 2 MG: 1 TABLET ORAL at 18:08

## 2024-12-29 RX ADMIN — THIAMINE HYDROCHLORIDE 200 MG: 100 INJECTION, SOLUTION INTRAMUSCULAR; INTRAVENOUS at 17:30

## 2024-12-29 NOTE — PLAN OF CARE
Goal Outcome Evaluation:      Patient to get MRI and see neuro. Left sided numbness and dizziness are the primary complaint. Alcohol management is a priority.  used.

## 2024-12-29 NOTE — ED NOTES
Pt arrives via ems from home for left leg weakness. At 1600 last night pt's left leg became weak and he fell. Pt went to bed and woke up with an extreme headache. Pt tried walking today and fell because his left leg is weak   Pt takes plavix, pt history of stroke

## 2024-12-29 NOTE — H&P
"[facility] [place]   HISTORY AND PHYSICAL    Patient Name: Stephane Jaimes  : 1970  MRN: [id number]  Primary Care Physician:  Anika Banuelos APRN  Date of admission: 2024    Subjective   Subjective     Chief Complaint:   Chief Complaint   Patient presents with    Extremity Weakness    Headache         HPI:    Stephane Jaimes is a 54 y.o. male with past medical history of left cerebellar ischemic stroke with brain compression/dysphagia in 2024, hypertension, and alcoholism who presented to the ED with multiple complaints.  Patient complained of left-sided general weakness as well as numbness that started on the morning of 2024 and has been unchanged since that time.  Patient reports that he did have a fall on the evening of 2024 because he felt dizzy, described as the room spinning.  Patient reports that he was walking outside to \"I late really like to rain and just wanted to enjoy the rain\".  Patient reports that he primarily only has the dizziness and unstable gait when he is going up and down stairs but does not notice it any other time.  Patient reports that he did hit his head on the left side but did not lose consciousness and denies any nausea or vomiting or headaches associated with this fall.  He reports the dizziness has been present since August.  Patient reports that he had 2 shots of liquor today, when asked how much he drinks daily he reports \"I used to drink a lot but not as much now\", but not able to give quantitative amount.  Patient denies any change in his hearing, headaches, chest pain, dyspnea, abdominal pain, nausea or vomiting, change in bowels, change in vision, slurred speech, dysphagia, or recent travel or sick contacts.  Patient denies missing any of his home meds.     service used throughout exam #[id number]; of note  had to repeat questions multiple times for the patient because he was requiring redirection throughout the interview.  Patient is " overall a very poor historian and no family at bedside.  Patient is noted to have an BAL of 190 on admission.    Review of Systems   All systems were reviewed and negative except for: All pertinent findings listed in the above HPI    Personal History     History reviewed. No pertinent past medical history.    Past Surgical History:   Procedure Laterality Date    CTA HEAD NECK STROKE PROTOCOL         Family History: family history is not on file. Otherwise pertinent FHx was reviewed and not pertinent to current issue.    Social History:  reports that he has been smoking cigarettes. He has never used smokeless tobacco. He reports current alcohol use. He reports that he does not use drugs.    Home Medications:  amLODIPine, aspirin, atorvastatin, clopidogrel, folic acid, and thiamine    Allergies:  No Known Allergies    Objective   Objective     Vitals:   Temp:  [97.7 °F (36.5 °C)-98.4 °F (36.9 °C)] 97.7 °F (36.5 °C)  Heart Rate:  [] 67  Resp:  [16-18] 18  BP: (110-150)/() 110/66  Physical Exam    Constitutional: Awake, alert, chronically ill-appearing male, not acutely toxic appearing   Eyes: PERRL, sclerae anicteric, no conjunctival injection, EOMI   HENT: NCAT, mucous membranes moist, normal hearing   Neck: Supple, nontender, trachea midline   Respiratory: Clear to auscultation bilaterally, nonlabored respirations on room air   Cardiovascular: RRR, no murmurs, palpable pedal pulses bilaterally   Gastrointestinal: Positive bowel sounds, soft, nontender, nondistended, obese abdomen   Musculoskeletal: No bilateral ankle edema, no clubbing or cyanosis to extremities   Psychiatric: Flat affect, cooperative   Neurologic: Oriented x 3, strength symmetric in all extremities, Cranial Nerves grossly intact to confrontation, minimal slurring of speech, poor focus and poor memory, mild tremor of hands   Skin: No rashes     Result Review    Result Review:  I have personally reviewed the results from the time of this  admission to 12/29/2024 18:37 EST and agree with these findings:  [x]  Laboratory list / accordion  []  Microbiology  [x]  Radiology  [x]  EKG/Telemetry   []  Cardiology/Vascular   []  Pathology  []  Old records  []  Other:  Most notable findings include: Potassium 3.4, serum creatinine 0.87, AST ALT within normal limits, A1c 5.8, triglycerides 127, total cholesterol 158, HDL 59, and LDL 77, WBC 11.87, and hemoglobin 15.9.  .  CTA head and neck performed and shows the known large chronic infarct involving the left cerebellar hemisphere within the left superior cerebellar artery and left PICA distributions that demonstrate interval evolutionary changes since the previous exam, known chronic pontine infarction not well delineated on this study, there is an age-indeterminate infarct within the medial aspect of the right occipital lobe within the right PCA distribution which is new since the prior study dated on April 12, 2024, previously identified filling defect compatible with intraluminal thrombus within the V1 segment of the left vertebral artery is no longer appreciated, there is a severe stenosis at the origin of the left vertebral artery, the origin of the left vertebral artery was not well-characterized on the prior study due to the presence of this thrombus, the right vertebral artery is again noted to be occluded at its origin and reconstitutes at the level of the distal V2 segment and similar findings were noted on the prior exam.  EKG shows sinus rhythm with LAD; but no acute ischemic changes      Assessment & Plan   Assessment / Plan     Brief Patient Summary:  Stephane Jaimes is a 54 y.o. male who is admitted for TIA rule out    Active Hospital Problems:  Active Hospital Problems    Diagnosis     **TIA (transient ischemic attack)      Plan:     TIA rule out:  -CTA head and neck obtained and actually showing interval evolutionary changes of his previous known left cerebellar and left PICA stroke, as well  as and new age indeterminate infarct within the medial aspect of the right occipital lobe within the right PCA distribution that is new since April 12, 2024.  -CTA head and neck also shows that the filling defect compatible with a intraluminal thrombus within the P1 segment of the left vertebral artery is no longer appreciated, there is severe stenosis at the origin of the left vertebral artery (unclear if this is changed given that the origin of the left vertebral artery was not well-characterized on previous study secondary to the presence of the thrombus) and the right vertebral artery is again noted to be occluded at its origin and reconstitutes at the level of the distal V2 segment  -MRI brain pending  -Continue home aspirin statin and Plavix  -Neurology consulted  -PT consulted  -Neurochecks per protocol    Alcoholism:  -Patient continues to drink despite being counseled on cessation back in April  -CIWA with as needed and scheduled benzos ordered  -Fall precautions    Chronic hypertension:  -Permissive hypertension at this time, holding home meds      VTE Prophylaxis:  Mechanical VTE prophylaxis orders are present.        CODE STATUS:    Level Of Support Discussed With: Patient  Code Status (Patient has no pulse and is not breathing): CPR (Attempt to Resuscitate)  Medical Interventions (Patient has pulse or is breathing): Full Support    Admission Status:  I believe this patient meets observation status.    Electronically signed by Yolanda Ramsey PA-C, 12/29/24, 6:37 PM EST.        75 minutes has been spent by [facility] providers in the care of this patient while under observation status      I have worn appropriate PPE during this patient encounter, sanitized my hands both with entering and exiting patient's room.    I have discussed plan of care with patient including advance care plan and/or surrogate decision maker.  Patient advises that their friend Stephane will be their  primary surrogate decision maker

## 2024-12-29 NOTE — ED PROVIDER NOTES
EMERGENCY DEPARTMENT ENCOUNTER  Room Number:  17/17  PCP: Anika Banuelos APRN  Independent Historians: Patient      HPI:  Chief Complaint: had concerns including Extremity Weakness and Headache.     A complete HPI/ROS/PMH/PSH/SH/FH are unobtainable due to: Nothing      Context: Stephane Jaimes is a 54 y.o. male with a medical history of hypertension, cerebellar stroke, alcohol abuse who presents to the ED c/o acute left leg weakness, left arm weakness, and headache.  Patient reports symptom onset yesterday around 4 PM.  He can move his left arm and leg fine but he reports his left leg feels particularly weak when he tries to walk.  He did experience a fall secondary to his left leg getting weak last night.  Patient reports that he was hospitalized here in April for stroke.  He is on aspirin and Plavix and reports compliance.      Review of prior external notes (non-ED) -and- Review of prior external test results outside of this encounter: See ED course below for summary of April hospitalization for alcohol intoxication and acute stroke.        PAST MEDICAL HISTORY  Active Ambulatory Problems     Diagnosis Date Noted    Alcohol intoxication 04/07/2024    Transaminitis 04/07/2024    HTN (hypertension) 04/08/2024    Cerebellar stroke 04/10/2024    Cerebral edema 04/10/2024     Resolved Ambulatory Problems     Diagnosis Date Noted    No Resolved Ambulatory Problems     No Additional Past Medical History         PAST SURGICAL HISTORY  No past surgical history on file.      FAMILY HISTORY  No family history on file.      SOCIAL HISTORY  Social History     Socioeconomic History    Marital status: Single   Tobacco Use    Smoking status: Never    Smokeless tobacco: Never   Vaping Use    Vaping status: Never Used   Substance and Sexual Activity    Alcohol use: Yes    Drug use: Never    Sexual activity: Defer         ALLERGIES  Patient has no known allergies.      REVIEW OF SYSTEMS  Review of all 14 systems is negative  other than stated in the HPI above.      PHYSICAL EXAM    I have reviewed the triage vital signs and nursing notes.    ED Triage Vitals   Temp Heart Rate Resp BP SpO2   12/29/24 1232 12/29/24 1232 12/29/24 1232 12/29/24 1232 12/29/24 1232   97.7 °F (36.5 °C) 113 16 150/100 97 %      Temp src Heart Rate Source Patient Position BP Location FiO2 (%)   12/29/24 1244 -- -- -- --   Tympanic             GENERAL: awake and alert, no acute distress  HENT: Normocephalic, atraumatic  EYES: no scleral icterus, pupils 3 mm directable laterally, EOMI  CV: regular rhythm, regular rate  RESPIRATORY: normal effort  ABDOMEN: soft, nondistended, nontender throughout  MUSCULOSKELETAL: no deformity  NEURO: alert, moves all extremities, follows commands, cranial nerves II through XII are grossly intact, no facial droop however he reports diminished sensation to light touch over the left side of the face.  He has preserved sensation light touch throughout the extremities.  He appears to have 5 out of 5 strength throughout bilateral upper and lower extremities.  PSYCH: calm, cooperative  SKIN: Warm, dry          LAB RESULTS  Recent Results (from the past 24 hours)   POC Glucose Once    Collection Time: 12/29/24 12:41 PM    Specimen: Blood   Result Value Ref Range    Glucose 98 70 - 130 mg/dL   Ethanol    Collection Time: 12/29/24 12:56 PM    Specimen: Arm, Left; Blood   Result Value Ref Range    Ethanol 190 (H) 0 - 10 mg/dL    Ethanol % 0.190 %   Comprehensive Metabolic Panel    Collection Time: 12/29/24 12:56 PM    Specimen: Arm, Left; Blood   Result Value Ref Range    Glucose 100 (H) 65 - 99 mg/dL    BUN 10 6 - 20 mg/dL    Creatinine 0.87 0.76 - 1.27 mg/dL    Sodium 145 136 - 145 mmol/L    Potassium 3.4 (L) 3.5 - 5.2 mmol/L    Chloride 107 98 - 107 mmol/L    CO2 22.0 22.0 - 29.0 mmol/L    Calcium 8.8 8.6 - 10.5 mg/dL    Total Protein 7.3 6.0 - 8.5 g/dL    Albumin 4.4 3.5 - 5.2 g/dL    ALT (SGPT) 26 1 - 41 U/L    AST (SGOT) 19 1 - 40 U/L     Alkaline Phosphatase 153 (H) 39 - 117 U/L    Total Bilirubin 0.3 0.0 - 1.2 mg/dL    Globulin 2.9 gm/dL    A/G Ratio 1.5 g/dL    BUN/Creatinine Ratio 11.5 7.0 - 25.0    Anion Gap 16.0 (H) 5.0 - 15.0 mmol/L    eGFR 103.8 >60.0 mL/min/1.73   CBC Auto Differential    Collection Time: 12/29/24 12:56 PM    Specimen: Arm, Left; Blood   Result Value Ref Range    WBC 11.87 (H) 3.40 - 10.80 10*3/mm3    RBC 4.98 4.14 - 5.80 10*6/mm3    Hemoglobin 15.9 13.0 - 17.7 g/dL    Hematocrit 46.1 37.5 - 51.0 %    MCV 92.6 79.0 - 97.0 fL    MCH 31.9 26.6 - 33.0 pg    MCHC 34.5 31.5 - 35.7 g/dL    RDW 12.9 12.3 - 15.4 %    RDW-SD 43.3 37.0 - 54.0 fl    MPV 8.8 6.0 - 12.0 fL    Platelets 408 140 - 450 10*3/mm3    Neutrophil % 66.0 42.7 - 76.0 %    Lymphocyte % 24.2 19.6 - 45.3 %    Monocyte % 7.8 5.0 - 12.0 %    Eosinophil % 0.8 0.3 - 6.2 %    Basophil % 0.5 0.0 - 1.5 %    Immature Grans % 0.7 (H) 0.0 - 0.5 %    Neutrophils, Absolute 7.85 (H) 1.70 - 7.00 10*3/mm3    Lymphocytes, Absolute 2.87 0.70 - 3.10 10*3/mm3    Monocytes, Absolute 0.92 (H) 0.10 - 0.90 10*3/mm3    Eosinophils, Absolute 0.09 0.00 - 0.40 10*3/mm3    Basophils, Absolute 0.06 0.00 - 0.20 10*3/mm3    Immature Grans, Absolute 0.08 (H) 0.00 - 0.05 10*3/mm3    nRBC 0.0 0.0 - 0.2 /100 WBC   ECG 12 Lead Stroke Evaluation    Collection Time: 12/29/24  1:04 PM   Result Value Ref Range    QT Interval 403 ms    QTC Interval 460 ms       The above labs were ordered by me and independently reviewed by me.     RADIOLOGY  No Radiology Exams Resulted Within Past 24 Hours    The above radiology studies were ordered by me.  See ED course for independent interpretations.     MEDICATIONS GIVEN IN ER  Medications   sodium chloride 0.9 % flush 10 mL (has no administration in time range)   iopamidol (ISOVUE-370) 76 % injection 100 mL (95 mL Intravenous Given by Other 12/29/24 8320)         ORDERS PLACED DURING THIS VISIT:  Orders Placed This Encounter   Procedures    CT Angiogram Head    CT  Angiogram Neck    Ethanol    Comprehensive Metabolic Panel    CBC Auto Differential    Continuous Pulse Oximetry    Monitor Blood Pressure    POC Glucose Once    ECG 12 Lead Stroke Evaluation    Insert Peripheral IV    CBC & Differential         OUTPATIENT MEDICATION MANAGEMENT:  Current Facility-Administered Medications Ordered in Epic   Medication Dose Route Frequency Provider Last Rate Last Admin    sodium chloride 0.9 % flush 10 mL  10 mL Intravenous Enrrique Yun MD         Current Outpatient Medications Ordered in Epic   Medication Sig Dispense Refill    amLODIPine (NORVASC) 10 MG tablet Take 1 tablet by mouth Daily. 30 tablet 0    aspirin 81 MG chewable tablet Chew 1 tablet Daily. 30 tablet 0    atorvastatin (LIPITOR) 80 MG tablet Take 1 tablet by mouth Every Night. 30 tablet 0    clopidogrel (PLAVIX) 75 MG tablet Take 1 tablet by mouth Daily. 30 tablet 0    folic acid (FOLVITE) 1 MG tablet Take 1 tablet by mouth Daily. 30 tablet 0    thiamine (VITAMIN B1) 100 MG tablet Take 1 tablet by mouth Daily. 30 tablet 0         PROCEDURES  Procedures      Critical care provider statement:    Critical care time (minutes): 35.   Critical care time was exclusive of:  Separately billable procedures and treating other patients   Critical care was necessary to treat or prevent imminent or life-threatening deterioration of the following conditions:  CNS Failure   Critical care was time spent personally by me on the following activities:  Development of treatment plan with patient or surrogate, discussions with consultants, evaluation of patient's response to treatment, examination of patient, obtaining history from patient or surrogate, ordering and performing treatments and interventions, ordering and review of laboratory studies, ordering and review of radiographic studies, pulse oximetry, re-evaluation of patient's condition and review of old charts. Critical Care indicators: Stroke       PROGRESS, DATA  ANALYSIS, CONSULTS, AND MEDICAL DECISION MAKING  All labs have been independently interpreted by me.  All radiology studies have been reviewed by me. All EKG's have been independently viewed and interpreted by me.  Discussion below represents my analysis of pertinent findings related to patient's condition, differential diagnosis, treatment plan and final disposition.    Differential diagnosis includes but is not limited to:  Acute ischemic stroke  Acute hemorrhagic stroke  Cervical myelopathy  TIA      Clinical Scores:                  ED Course as of 12/29/24 1513   Sun Dec 29, 2024   1254 I reviewed discharge summary from 4/17/2024.  Patient has a history of alcohol abuse and had been found unresponsive in his car by EMS.  He reportedly drinks alcohol daily.  He was found to have acute left cerebellar ischemic stroke.  He was started on aspirin Plavix and statin therapy. [JR]   1256 On initial exam patient has an NIH stroke scale of 1 due to reported numbness on the left side of his face only.  He is complaining of weakness in his left arm and left leg, however he does not have objective weakness on exam.  Additionally he reports symptom onset around 4 PM yesterday.  He is not a candidate for thrombolytic therapy due to nondisabling symptoms and delayed presentation. [JR]   1256 I have ordered CTA head and neck for initial evaluation of symptoms.  I anticipate he will need admission and brain MRI. [JR]   1256 I have added ethanol levels patient reportedly has a history of alcohol abuse.  He will need to be monitored for signs or symptoms of alcohol withdrawal. [JR]   1330 EKG          EKG time: 1304  Rhythm/Rate: Sinus rhythm, 78  P waves and TN: Normal  QRS, axis: Borderline left axis  ST and T waves: No acute ischemic changes    Interpreted Contemporaneously by me, independently viewed         [JR]   1331 Ethanol(!): 190 [JR]   1504 CT brain without contrast independently interpreted PACS and demonstrates left  cerebellar encephalomalacia consistent with history of previous stroke.  I do not see evidence of acute intracranial hemorrhage. [JR]   1507 CTA head and neck independently interpreted PACS.  There appears to be absent flow in the proximal right vertebral artery with some flow distally.  I do not see any significant stenosis within the anterior circulation. [JR]   1511 I discussed patient's presentation with SHARRON Barrera and the ED observation unit who agrees to admit for further evaluation of symptoms. [JR]      ED Course User Index  [JR] Enrrique Long MD             AS OF 15:13 EST VITALS:    BP - 112/80  HR - 65  TEMP - 98.4 °F (36.9 °C) (Tympanic)  O2 SATS - 96%    COMPLEXITY OF CARE  The patient requires admission.      Chronic or social conditions impacting patient care (Social Determinants of Health):     DIAGNOSIS  Final diagnoses:   Acute left-sided weakness   History of CVA (cerebrovascular accident)   Uncomplicated alcohol dependence           DISPOSITION  Admit      Prescription drug monitoring program review:           Please note that portions of this document were completed with a voice recognition program.    Note Disclaimer: At [facility], we believe that sharing information builds trust and better relationships. You are receiving this note because you recently visited [facility]. It is possible you will see health information before a provider has talked with you about it. This kind of information can be easy to misunderstand. To help you fully understand what it means for your health, we urge you to discuss this note with your provider.         Enrrique Long MD  12/29/24 8301

## 2024-12-30 LAB
ALBUMIN SERPL-MCNC: 4 G/DL (ref 3.5–5.2)
ALBUMIN/GLOB SERPL: 1.5 G/DL
ALP SERPL-CCNC: 127 U/L (ref 39–117)
ALT SERPL W P-5'-P-CCNC: 25 U/L (ref 1–41)
ANION GAP SERPL CALCULATED.3IONS-SCNC: 8.3 MMOL/L (ref 5–15)
APTT PPP: 26.3 SECONDS (ref 22.7–35.4)
AST SERPL-CCNC: 22 U/L (ref 1–40)
BILIRUB SERPL-MCNC: 0.7 MG/DL (ref 0–1.2)
BUN SERPL-MCNC: 12 MG/DL (ref 6–20)
BUN/CREAT SERPL: 15.8 (ref 7–25)
CALCIUM SPEC-SCNC: 8.7 MG/DL (ref 8.6–10.5)
CHLORIDE SERPL-SCNC: 105 MMOL/L (ref 98–107)
CO2 SERPL-SCNC: 22.7 MMOL/L (ref 22–29)
CREAT SERPL-MCNC: 0.76 MG/DL (ref 0.76–1.27)
DEPRECATED RDW RBC AUTO: 45.4 FL (ref 37–54)
EGFRCR SERPLBLD CKD-EPI 2021: 108.1 ML/MIN/1.73
ERYTHROCYTE [DISTWIDTH] IN BLOOD BY AUTOMATED COUNT: 13.1 % (ref 12.3–15.4)
GLOBULIN UR ELPH-MCNC: 2.7 GM/DL
GLUCOSE BLDC GLUCOMTR-MCNC: 141 MG/DL (ref 70–130)
GLUCOSE BLDC GLUCOMTR-MCNC: 85 MG/DL (ref 70–130)
GLUCOSE SERPL-MCNC: 97 MG/DL (ref 65–99)
HCT VFR BLD AUTO: 43.9 % (ref 37.5–51)
HGB BLD-MCNC: 14.7 G/DL (ref 13–17.7)
INR PPP: 0.97 (ref 0.9–1.1)
MCH RBC QN AUTO: 31.5 PG (ref 26.6–33)
MCHC RBC AUTO-ENTMCNC: 33.5 G/DL (ref 31.5–35.7)
MCV RBC AUTO: 94.2 FL (ref 79–97)
PLATELET # BLD AUTO: 355 10*3/MM3 (ref 140–450)
PMV BLD AUTO: 8.8 FL (ref 6–12)
POTASSIUM SERPL-SCNC: 3.8 MMOL/L (ref 3.5–5.2)
PROT SERPL-MCNC: 6.7 G/DL (ref 6–8.5)
PROTHROMBIN TIME: 13.1 SECONDS (ref 11.7–14.2)
RBC # BLD AUTO: 4.66 10*6/MM3 (ref 4.14–5.8)
SODIUM SERPL-SCNC: 136 MMOL/L (ref 136–145)
WBC NRBC COR # BLD AUTO: 11.03 10*3/MM3 (ref 3.4–10.8)

## 2024-12-30 PROCEDURE — 85730 THROMBOPLASTIN TIME PARTIAL: CPT | Performed by: EMERGENCY MEDICINE

## 2024-12-30 PROCEDURE — G0378 HOSPITAL OBSERVATION PER HR: HCPCS

## 2024-12-30 PROCEDURE — 82948 REAGENT STRIP/BLOOD GLUCOSE: CPT

## 2024-12-30 PROCEDURE — 97161 PT EVAL LOW COMPLEX 20 MIN: CPT

## 2024-12-30 PROCEDURE — 96376 TX/PRO/DX INJ SAME DRUG ADON: CPT

## 2024-12-30 PROCEDURE — 25010000002 THIAMINE PER 100 MG: Performed by: EMERGENCY MEDICINE

## 2024-12-30 PROCEDURE — 80053 COMPREHEN METABOLIC PANEL: CPT | Performed by: EMERGENCY MEDICINE

## 2024-12-30 PROCEDURE — 99214 OFFICE O/P EST MOD 30 MIN: CPT | Performed by: STUDENT IN AN ORGANIZED HEALTH CARE EDUCATION/TRAINING PROGRAM

## 2024-12-30 PROCEDURE — 85610 PROTHROMBIN TIME: CPT | Performed by: EMERGENCY MEDICINE

## 2024-12-30 PROCEDURE — 99254 IP/OBS CNSLTJ NEW/EST MOD 60: CPT | Performed by: INTERNAL MEDICINE

## 2024-12-30 PROCEDURE — 85027 COMPLETE CBC AUTOMATED: CPT | Performed by: EMERGENCY MEDICINE

## 2024-12-30 PROCEDURE — 97530 THERAPEUTIC ACTIVITIES: CPT

## 2024-12-30 PROCEDURE — 25010000002 THIAMINE HCL 200 MG/2ML SOLUTION: Performed by: EMERGENCY MEDICINE

## 2024-12-30 RX ADMIN — DIAZEPAM 10 MG: 5 TABLET ORAL at 04:23

## 2024-12-30 RX ADMIN — THIAMINE HYDROCHLORIDE 200 MG: 100 INJECTION, SOLUTION INTRAMUSCULAR; INTRAVENOUS at 21:27

## 2024-12-30 RX ADMIN — DIAZEPAM 10 MG: 5 TABLET ORAL at 17:41

## 2024-12-30 RX ADMIN — DIAZEPAM 10 MG: 5 TABLET ORAL at 09:48

## 2024-12-30 RX ADMIN — ATORVASTATIN CALCIUM 80 MG: 80 TABLET, FILM COATED ORAL at 20:51

## 2024-12-30 RX ADMIN — Medication 10 ML: at 09:49

## 2024-12-30 RX ADMIN — CLOPIDOGREL BISULFATE 75 MG: 75 TABLET ORAL at 09:47

## 2024-12-30 RX ADMIN — Medication 10 ML: at 21:26

## 2024-12-30 RX ADMIN — FOLIC ACID 1 MG: 1 TABLET ORAL at 09:48

## 2024-12-30 RX ADMIN — THIAMINE HYDROCHLORIDE 200 MG: 100 INJECTION, SOLUTION INTRAMUSCULAR; INTRAVENOUS at 06:18

## 2024-12-30 RX ADMIN — ASPIRIN 81 MG: 81 TABLET, CHEWABLE ORAL at 09:47

## 2024-12-30 RX ADMIN — THIAMINE HYDROCHLORIDE 200 MG: 100 INJECTION, SOLUTION INTRAMUSCULAR; INTRAVENOUS at 15:05

## 2024-12-30 NOTE — THERAPY EVALUATION
Patient Name: Stephane Jaimes  : 1970    MRN: [id number]                              Today's Date: 2024       Admit Date: 2024    Visit Dx:     ICD-10-CM ICD-9-CM   1. Acute left-sided weakness  R53.1 728.87   2. History of CVA (cerebrovascular accident)  Z86.73 V12.54   3. Uncomplicated alcohol dependence  F10.20 303.90     Patient Active Problem List   Diagnosis    Alcohol intoxication    Transaminitis    HTN (hypertension)    Cerebellar stroke    Cerebral edema    TIA (transient ischemic attack)     History reviewed. No pertinent past medical history.  Past Surgical History:   Procedure Laterality Date    CTA HEAD NECK STROKE PROTOCOL        General Information       Row Name 24 09          Physical Therapy Time and Intention    Document Type evaluation  -     Mode of Treatment individual therapy;physical therapy  -       Row Name 24 09          General Information    Patient Profile Reviewed yes  -SM     Prior Level of Function independent:  -     Existing Precautions/Restrictions fall  -       Row Name 24 09          Living Environment    People in Home other (see comments)  Roomates  -       Row Name 24 09          Home Main Entrance    Number of Stairs, Main Entrance ten  -       Row Name 24 09          Cognition    Orientation Status (Cognition) oriented x 4  -       Row Name 24 09          Safety Issues/Impairments Affecting Functional Mobility    Impairments Affecting Function (Mobility) balance;strength  -               User Key  (r) = Recorded By, (t) = Taken By, (c) = Cosigned By      Initials Name Provider Type     Anna Cheng PT Physical Therapist                   Mobility       Row Name 24 09          Bed Mobility    Bed Mobility supine-sit  -     Supine-Sit [place] (Bed Mobility) modified independence  -     Assistive Device (Bed Mobility) head of bed elevated  -       Row Name 24 09           Sit-Stand Transfer    Sit-Stand [place] (Transfers) contact guard  -       Row Name 12/30/24 0908          Gait/Stairs (Locomotion)    [place] Level (Gait) contact guard;minimum assist (75% patient effort)  -     Distance in Feet (Gait) 60  -SM     Deviations/Abnormal Patterns (Gait) gait speed decreased  -     Left Sided Gait Deviations heel strike decreased;hip hiking;weight shift ability decreased  -     Comment, (Gait/Stairs) Gait somewhat unsteady with patient veering to his left. Decreased heel strike noted on left with patient ambulating mostly on toes on that side. Patient able to actively DF foot against gravity when asked.  -               User Key  (r) = Recorded By, (t) = Taken By, (c) = Cosigned By      Initials Name Provider Type    Anna Quispe PT Physical Therapist                   Obj/Interventions       Row Name 12/30/24 0910          Range of Motion Comprehensive    General Range of Motion bilateral lower extremity ROM WFL  -       Row Name 12/30/24 0910          Strength Comprehensive (MMT)    Comment, General Manual Muscle Testing (MMT) Assessment No unilateral deficits noted with MMT. B LEs grossly 4/5  -       Row Name 12/30/24 0910          Balance    Balance Assessment sitting static balance;sitting dynamic balance;standing static balance;standing dynamic balance  -     Static Sitting Balance independent  -     Dynamic Sitting Balance modified independence  -     Position, Sitting Balance sitting edge of bed  -     Static Standing Balance contact guard  -     Dynamic Standing Balance contact guard;minimal assist  -     Balance Interventions sitting;standing;sit to stand;static;dynamic  -               User Key  (r) = Recorded By, (t) = Taken By, (c) = Cosigned By      Initials Name Provider Type     Anna Cheng PT Physical Therapist                   Goals/Plan       Row Name 12/30/24 0919          Transfer Goal 1 (PT)     Activity/Assistive Device (Transfer Goal 1, PT) sit-to-stand/stand-to-sit;bed-to-chair/chair-to-bed  -SM     [place] Level/Cues Needed (Transfer Goal 1, PT) independent  -SM     Time Frame (Transfer Goal 1, PT) 1 week  -SM       Row Name 12/30/24 0919          Gait Training Goal 1 (PT)    Activity/Assistive Device (Gait Training Goal 1, PT) gait (walking locomotion)  -SM     [place] Level (Gait Training Goal 1, PT) supervision required  -SM     Distance (Gait Training Goal 1, PT) 250ft  -SM     Time Frame (Gait Training Goal 1, PT) 1 week  -SM               User Key  (r) = Recorded By, (t) = Taken By, (c) = Cosigned By      Initials Name Provider Type     Anna Cheng, JANICE Physical Therapist                   Clinical Impression       Row Name 12/30/24 0911          Pain    Pain Location extremity  -SM     Pain Side/Orientation left;upper  -SM     Pain Management Interventions exercise or physical activity utilized  -     Response to Pain Interventions activity participation with tolerable pain  -SM       Row Name 12/30/24 0911          Plan of Care Review    Plan of Care Reviewed With patient  -SM     Outcome Evaluation Patient is a 54 y.o male who presented to [facility] with L LE weakness and a fall. MRI negative for acute findings.  used throughout session. Patient reports he lives with 2 roommates in an apartment with [address]. Patient is independent at baseline with no AD. Patient completed all bed mobility independently. No unilateral strength deficits noted with MMT. Patient mildly unsteady when standing from EOB. Patient ambulated in the hallway with no AD and CGA-Tanner. Gait somewhat unsteady with patient veering to his left. Decreased heel strike noted on left with patient ambulating mostly on toes on that side. Patient able to actively DF foot against gravity when asked. Patient would continue to benefit from skilled PT intervention to address deficits in functional mobility and  maximize safety and independence. Anticipate return home with assist pending medical course. PT will continue to monitor.  -       Row Name 12/30/24 0911          Therapy Assessment/Plan (PT)    Rehab Potential (PT) good  -SM     Criteria for Skilled Interventions Met (PT) yes  -SM     Therapy Frequency (PT) 5 times/wk  -       Row Name 12/30/24 0911          Vital Signs    Pre Patient Position Supine  -SM     Intra Patient Position Standing  -SM     Post Patient Position Sitting  -       Row Name 12/30/24 0911          Positioning and Restraints    Pre-Treatment Position in bed  -SM     Post Treatment Position bed  -SM     In Bed notified nsg;sitting EOB;call light within reach;encouraged to call for assist;exit alarm on  -               User Key  (r) = Recorded By, (t) = Taken By, (c) = Cosigned By      Initials Name Provider Type    Anna Quispe PT Physical Therapist                   Outcome Measures       Row Name 12/30/24 0919          How much help from another person do you currently need...    Turning from your back to your side while in flat bed without using bedrails? 4  -SM     Moving from lying on back to sitting on the side of a flat bed without bedrails? 4  -SM     Moving to and from a bed to a chair (including a wheelchair)? 3  -SM     Standing up from a chair using your arms (e.g., wheelchair, bedside chair)? 3  -SM     Climbing 3-5 steps with a railing? 2  -SM     To walk in hospital room? 3  -SM     AM-PAC 6 Clicks Score (PT) 19  -     Highest Level of Mobility Goal 6 --> Walk 10 steps or more  -       Row Name 12/30/24 0919          Functional Assessment    Outcome Measure Options AM-PAC 6 Clicks Basic Mobility (PT)  -               User Key  (r) = Recorded By, (t) = Taken By, (c) = Cosigned By      Initials Name Provider Type    Anna Quispe PT Physical Therapist                                 Physical Therapy Education       Title: PT OT SLP Therapies (Done)        Topic: Physical Therapy (Done)       Point: Mobility training (Done)       Learning Progress Summary            Patient Acceptance, E, VU by  at 12/30/2024 0919                      Point: Home exercise program (Done)       Learning Progress Summary            Patient Acceptance, E, VU by  at 12/30/2024 0919                      Point: Body mechanics (Done)       Learning Progress Summary            Patient Acceptance, E, VU by  at 12/30/2024 0919                      Point: Precautions (Done)       Learning Progress Summary            Patient Acceptance, E, VU by  at 12/30/2024 0919                                      User Key       Initials Effective Dates Name Provider Type Discipline     05/02/22 -  Anna Chegn, JANICE Physical Therapist PT                  PT Recommendation and Plan     Outcome Evaluation: Patient is a 54 y.o male who presented to [facility] with L LE weakness and a fall. MRI negative for acute findings.  used throughout session. Patient reports he lives with 2 roommates in an apartment with [address]. Patient is independent at baseline with no AD. Patient completed all bed mobility independently. No unilateral strength deficits noted with MMT. Patient mildly unsteady when standing from EOB. Patient ambulated in the hallway with no AD and CGA-Tanner. Gait somewhat unsteady with patient veering to his left. Decreased heel strike noted on left with patient ambulating mostly on toes on that side. Patient able to actively DF foot against gravity when asked. Patient would continue to benefit from skilled PT intervention to address deficits in functional mobility and maximize safety and independence. Anticipate return home with assist pending medical course. PT will continue to monitor.     Time Calculation:         PT Charges       Row Name 12/30/24 0920             Time Calculation    Start Time 0831  -SM      Stop Time 0845  -SM      Time Calculation (min) 14 min  -SM      PT  Received On 12/30/24  -      PT - Next Appointment 12/31/24  -      PT Goal Re-Cert Due Date 01/06/25  -         Time Calculation- PT    Total Timed Code Minutes- PT 8 minute(s)  -         Timed Charges    80209 - PT Therapeutic Activity Minutes 8  -SM         Total Minutes    Timed Charges Total Minutes 8  -SM       Total Minutes 8  -SM                User Key  (r) = Recorded By, (t) = Taken By, (c) = Cosigned By      Initials Name Provider Type     Anna Cheng, JANICE Physical Therapist                  Therapy Charges for Today       Code Description Service Date Service Provider Modifiers Qty    87367127593 HC PT THERAPEUTIC ACT EA 15 MIN 12/30/2024 Anna Cheng, PT GP 1    67602885840 HC PT EVAL LOW COMPLEXITY 3 12/30/2024 Anna Cheng, PT GP 1            PT G-Codes  Outcome Measure Options: AM-PAC 6 Clicks Basic Mobility (PT)  AM-PAC 6 Clicks Score (PT): 19  PT Discharge Summary  Anticipated Discharge Disposition (PT): home with assist, home with outpatient therapy services    Anna Cheng PT  12/30/2024

## 2024-12-30 NOTE — CONSULTS
[facility] INITIAL INPATIENT CONSULTATION NOTE    REASON FOR CONSULTATION:    History of stroke with abnormal hypercoagulable evaluation    HISTORY OF PRESENT ILLNESS:  Stephane Jaimes is a 54 y.o. male who we are asked to see today in consultation for the above issue.    Patient has significant history of alcohol abuse.  He has a history of hypertension.    Patient with history of left cerebellar stroke in April 2024.  Patient was admitted 4/6 - 4/17/2024.  He was found unresponsive in his car after drinking significant alcohol.  He was found to have acute ischemic stroke involving left dick and cerebellum.  He was felt to have left vertebral thrombus.  Echocardiogram 4/9/24 with ejection fraction 70.8%, no evidence of thrombus.  Cardiology recommended evaluation with Zio patch and KYLE however patient declined this.  Hypercoagulable evaluation on 4/10/2024 with negative factor V Leiden mutation, negative prothrombin gene mutation, Antithrombin 128%, protein C activity 109%, protein C antigen 118%, protein S antigen free greater than 150%, total protein S antigen 94%, protein S activity 145%, lupus anticoagulant negative, anticardiolipin antibodies negative.  Anti-beta-2 GP 1 IgA antibody elevated at 46.  He was discharged on aspirin 81 mg daily and Plavix 75 mg daily.    Patient admitted 12/29/2024 with left leg weakness, altered gait.  Patient with elevated alcohol level of 190.  CT angiogram head and neck on 12/29/2024 showed chronic infarct left cerebellar hemisphere, age-indeterminate infarct right occipital lobe (new since 4/12/2024), no evidence of left vertebral artery occlusion as seen previously, severe stenosis left vertebral artery origin.  Right vertebral artery again occluded at origin.  MRI brain 12/30/2024 with remote ischemic injury left cerebellum and dick.  Patient was seen by neurology.  He was felt to have old stroke exacerbation versus symptoms related to alcohol intoxication.  Patient  continuing on aspirin 81 mg daily and Plavix 75 mg daily.          History reviewed. No pertinent past medical history.    Past Surgical History:   Procedure Laterality Date    CTA HEAD NECK STROKE PROTOCOL         SOCIAL HISTORY:   reports that he has been smoking cigarettes. He has never used smokeless tobacco. He reports current alcohol use. He reports that he does not use drugs.    FAMILY HISTORY:  family history is not on file.    ALLERGIES:  No Known Allergies    MEDICATIONS:  As listed in the electronic medical record.    Review of Systems  The comprehensive review of systems was obtained with pertinent positive findings as noted in the interval history above.  All other systems negative.    Vitals:    12/29/24 2350 12/30/24 0421 12/30/24 0700 12/30/24 1100   BP: 109/83 107/72 141/90 (!) 146/102   BP Location: Right arm Right arm Right arm Right arm   Patient Position: Lying Lying Lying Lying   Pulse: 72 60 59 62   Resp: 18 15 16 16   Temp: 97.9 °F (36.6 °C) 98.2 °F (36.8 °C) 97.5 °F (36.4 °C) 97.5 °F (36.4 °C)   TempSrc: Oral Oral Oral Oral   SpO2: 96% 98% 97% 93%   Weight:       Height:           Physical Exam  Constitutional:       Appearance: He is well-developed.   Eyes:      Conjunctiva/sclera: Conjunctivae normal.   Neck:      Thyroid: No thyromegaly.   Cardiovascular:      Rate and Rhythm: Normal rate and regular rhythm.      Heart sounds: No murmur heard.     No friction rub. No gallop.   Pulmonary:      Effort: No respiratory distress.      Breath sounds: Normal breath sounds.   Abdominal:      General: Bowel sounds are normal. There is no distension.      Palpations: Abdomen is soft.      Tenderness: There is no abdominal tenderness.   Lymphadenopathy:      Head:      Right side of head: No submandibular adenopathy.      Cervical: No cervical adenopathy.      Upper Body:      Right upper body: No supraclavicular adenopathy.      Left upper body: No supraclavicular adenopathy.   Skin:     General:  Skin is warm and dry.      Findings: No rash.   Neurological:      Mental Status: He is alert and oriented to person, place, and time.      Cranial Nerves: No cranial nerve deficit.      Motor: No abnormal muscle tone.      Deep Tendon Reflexes: Reflexes normal.   Psychiatric:         Behavior: Behavior normal.         DIAGNOSTIC DATA:    Results from last 7 days   Lab Units 12/30/24  0408 12/29/24  1256   WBC 10*3/mm3 11.03* 11.87*   HEMOGLOBIN g/dL 14.7 15.9   HEMATOCRIT % 43.9 46.1   PLATELETS 10*3/mm3 355 408      Results from last 7 days   Lab Units 12/30/24  0408 12/29/24  1256   SODIUM mmol/L 136 145   POTASSIUM mmol/L 3.8 3.4*   CHLORIDE mmol/L 105 107   CO2 mmol/L 22.7 22.0   BUN mg/dL 12 10   CREATININE mg/dL 0.76 0.87   CALCIUM mg/dL 8.7 8.8   BILIRUBIN mg/dL 0.7 0.3   ALK PHOS U/L 127* 153*   ALT (SGPT) U/L 25 26   AST (SGOT) U/L 22 19   GLUCOSE mg/dL 97 100*            Assessment & Plan   ASSESSMENT:  This is a 54 y.o. male with:    *History of left cerebellar/dick stroke in April 2024  Patient with history of left cerebellar stroke in April 2024.    Patient was admitted 4/6 - 4/17/2024.  He was found unresponsive in his car after drinking significant alcohol.    He was found to have acute ischemic stroke involving left dick and cerebellum.  He was felt to have left vertebral thrombus.    Echocardiogram 4/9/24 with ejection fraction 70.8%, no evidence of thrombus.  Cardiology recommended evaluation with Zio patch and KYLE however patient declined this.    Hypercoagulable evaluation on 4/10/2024 with negative factor V Leiden mutation, negative prothrombin gene mutation, Antithrombin 128%, protein C activity 109%, protein C antigen 118%, protein S antigen free greater than 150%, total protein S antigen 94%, protein S activity 145%, lupus anticoagulant negative, anticardiolipin antibodies negative.  Anti-beta-2 GP 1 IgA antibody elevated at 46.    He was discharged on aspirin 81 mg daily and Plavix 75 mg  daily.  Patient admitted 12/29/2024 with left leg weakness, altered gait.  Patient with elevated alcohol level of 190.    CT angiogram head and neck on 12/29/2024 showed chronic infarct left cerebellar hemisphere, age-indeterminate infarct right occipital lobe (new since 4/12/2024), no evidence of left vertebral artery occlusion as seen previously, severe stenosis left vertebral artery origin.  Right vertebral artery again occluded at origin.    MRI brain 12/30/2024 with remote ischemic injury left cerebellum and dick.    Patient was seen by neurology.  He was felt to have old stroke exacerbation versus symptoms related to alcohol intoxication.    Patient continuing on aspirin 81 mg daily and Plavix 75 mg daily.  Patient with previous acute stroke in April 2024.  No evidence of recurrent stroke currently.  Reviewed previous hypercoagulable evaluation.  Protein S levels are only concerning for hypercoagulable state if they are low.  The elevated levels are of no significance.  He did have a positive antibeta 2 GP 1 IgA antibody which does raise some suspicion for underlying antiphospholipid syndrome however the IgA antibody is not in diagnostic criteria for antiphospholipid syndrome.  Recommend repeating antiphospholipid studies with a.m. labs including lupus anticoagulant, anticardiolipin antibody panel, antibeta 2 GP 1 antibody panel.  We will repeat labs with a.m. blood draw.  Results will not be available prior to discharge and we will arrange to see him back in the next few weeks to review results.  At this time there does not appear to be any indication to initiate anticoagulation.    *Alcoholism  Patient with significant history of alcoholism, has been counseled on need to pursue alcohol cessation.        PLAN:   Patient currently continuing on aspirin 81 mg daily and Plavix 75 mg daily per neurology regarding prior stroke  Repeat antiphospholipid studies with a.m. labs including lupus anticoagulant,  anticardiolipin antibody panel, antibeta 2 GP 1 antibody panel  There is not appear to be any indication from hematologic standpoint to pursue anticoagulation  Arrange outpatient follow-up to review antiphospholipid studies in the next few weeks.  We will sign off, please call with further questions    Discussed with patient at bedside.      iPad  was used      Simone Quinteros MD

## 2024-12-30 NOTE — PLAN OF CARE
Goal Outcome Evaluation:  Plan of Care Reviewed With: patient           Outcome Evaluation: Patient is a 54 y.o male who presented to [facility] with L LE weakness and a fall. MRI negative for acute findings.  used throughout session. Patient reports he lives with 2 roommates in an apartment with [address]. Patient is independent at baseline with no AD. Patient completed all bed mobility independently. No unilateral strength deficits noted with MMT. Patient mildly unsteady when standing from EOB. Patient ambulated in the hallway with no AD and CGA-Tanner. Gait somewhat unsteady with patient veering to his left. Decreased heel strike noted on left with patient ambulating mostly on toes on that side. Patient able to actively DF foot against gravity when asked. Patient would continue to benefit from skilled PT intervention to address deficits in functional mobility and maximize safety and independence. Anticipate return home with assist pending medical course. PT will continue to monitor.    Anticipated Discharge Disposition (PT): home with assist, home with outpatient therapy services

## 2024-12-30 NOTE — CONSULTS
"Neurology Consult Note    Consult Date: 12/30/2024    Referring MD: No ref. provider found    Reason for Consult I have been asked to see the patient in neurological consultation to render advice and opinion regarding left leg weakness, prior history of left cerebellar stroke.    Stephane Jaimes is a 54 y.o. right-handed  male with known diagnosis of left cerebellar stroke in April 2024 etiology ESUS, left vertebral thrombus, alcohol abuse presented to the hospital for evaluation of left leg weakness and wobbly gait, his labs showed alcohol level elevated at 190, this morning on my interview he stated that his symptoms improved.  He had an MRI brain which showed old left cerebellar stroke, no acute finding.  Repeat his CTA head and neck showed resolution of the left vertebral origin thrombus, chronic right vertebral V1 occlusion.  Patient had a prior workup for his stroke including hypercoagulation panel and Holter monitor, the Holter monitor was not returned.  His hypercoagulation panel showed elevated beta-2 glycoprotein at 46, protein S functional elevated at 145, protein S antigen free more than 150.  Patient smokes cigarettes occasionally, denies alcohol abuse but his labs showed high alcohol level on presentation.    History reviewed. No pertinent past medical history.    Exam  /90 (BP Location: Right arm, Patient Position: Lying)   Pulse 59   Temp 97.5 °F (36.4 °C) (Oral)   Resp 16   Ht 170.2 cm (67\")   Wt 79.6 kg (175 lb 8 oz)   SpO2 97%   BMI 27.49 kg/m²   Gen: NAD, vitals reviewed  MS: oriented x3, recent/remote memory intact, normal attention/concentration, language intact, no neglect.  CN: visual acuity grossly normal, PERRL, intermittent left gaze nystagmus, no facial droop, no dysarthria  Motor: 5/5 throughout upper and lower extremities, normal tone  Sensory exam: Normal to light touch throughout  Coordination: Mild dysmetria on the left upper extremity    DATA:    Lab Results "   Component Value Date    GLUCOSE 97 12/30/2024    CALCIUM 8.7 12/30/2024     12/30/2024    K 3.8 12/30/2024    CO2 22.7 12/30/2024     12/30/2024    BUN 12 12/30/2024    CREATININE 0.76 12/30/2024    BCR 15.8 12/30/2024    ANIONGAP 8.3 12/30/2024     Lab Results   Component Value Date    WBC 11.03 (H) 12/30/2024    HGB 14.7 12/30/2024    HCT 43.9 12/30/2024    MCV 94.2 12/30/2024     12/30/2024       Lab review: Hypercoagulation panel from prior stroke workup showed elevated beta-2 glycoprotein at 46    Imaging review: I personally reviewed the MRI brain which showed old left cerebellar stroke, no acute finding.  CTA head and neck showed resolution of the left vertebral origin thrombus, chronic right vertebral artery occlusion.  Radiology report reviewed.    IMPRESSION:    CTA HEAD:     Anterior Circulation: Unremarkable appearance of the internal carotid  arteries, middle cerebral arteries, and anterior cerebral arteries.  Posterior Circulation: Dominant left vertebral artery. Otherwise,  unremarkable appearance of the vertebral arteries, basilar artery, and  posterior cerebral arteries.     Ancillary Head Findings: Chronic infarct involving the left superior  cerebellar artery and left PICA distributions with a focus of  encephalomalacia noted measuring up to approximately 5.1 x 3.8 cm in  greatest axial dimensions. Interval evolutionary change is seen since  the prior examination dated 04/12/2024. Known chronic pontine infarcts  are not well delineated on this examination. There is an  age-indeterminate infarct within the medial aspect of the right  occipital lobe which measures up to approximately 1.8 x 1.0 cm in  greatest axial dimensions and is within the right PCA distribution. This  abnormality is new since the previous examination.     There is a large chronic infarct involving the left cerebellar  hemisphere within the left superior cerebellar artery and left PICA  distributions that  demonstrates interval evolutionary change since the  previous exam. Known chronic pontine infarction not well delineated on  this study. There is an age-indeterminate infarct within the medial  aspect of the right occipital lobe within the right PCA distribution  which is new since the prior study dated 04/12/2024. This could be  further temporally characterized on MR imaging.     The previously identified filling defect compatible with intraluminal  thrombus within the V1 segment of the left vertebral artery is no longer  appreciated. There is a severe stenosis at the origin of the left  vertebral artery. The origin of the left vertebral artery was not well  characterized on the prior study due to the presence of this thrombus.  The right vertebral artery is again noted to be occluded at its origin  and reconstitutes at the level of the distal V2 segment and similar  findings were noted on the prior exam.    Diagnoses:  -Old stroke exacerbation versus related to alcohol intoxication  -Alcohol abuse  -Tobacco abuse  -Abnormal hypercoagulation panel    Pre-stroke MRS: 1  NIHSS: 1      PLAN:   -Will consult heme-onc for the abnormal hypercoagulation panel, if anticoagulation indicated stop dual antiplatelet therapy.  -Continue Lipitor 80 mg daily  -Counseled the patient regarding alcohol and tobacco abuse  -Further workup depending on the need for anticoagulation.-If we start on anticoagulation probably no need to do a Holter monitor or transesophageal echo as this would not .  -Discussed with observation unit provider  -PT/OT eval  -Follow-up with the stroke clinic in 6 to 8 weeks.  MA contacted to arrange follow-up.    Medical Decision Making for this neurology consultation consists of the following:  Review of previous chart, including H/P, provider and nursing notes as applicable.  Review of medications and vital signs.  Review of previous labs, neuroimaging, and additional relevant diagnostics.  "  Interpretation of laboratory, imaging, and other diagnostic results  Discussion with other providers and family   Total face-to-face/floor time: 30-61 minutes.     \"Dictated utilizing Dragon dictation\".     "

## 2024-12-30 NOTE — PROGRESS NOTES
KATE MACKEY Attestation Note    I supervised care provided by the midlevel provider.    The SHARRON and I have discussed this patient's history, physical exam, and treatment plan. I have reviewed the documentation and personally had a face to face interaction with the patient  I affirm the documentation and agree with the treatment and plan. I provided a substantive portion of the care of this patient.  I personally performed the physical exam, in its entirety.  My personal findings are documented in below:    History:  Patient with history of hypertension, stroke and alcoholism is admitted for further workup and management of dizziness, falls and generalized weakness.  Patient describes a dizzy feeling such as the room spinning.  At the time of my evaluation he denies headache or chest pain.  Denies any nausea or vomiting.  Denies vision changes.    Physical Exam:  General: No acute distress.  HENT: NCAT  Eyes: no scleral icterus.  EOMI  Neck: Painless range of motion  CV: Pink warm and well-perfused throughout  Respiratory: No distress or increased work of breathing  Abdomen: soft, no focal tenderness or rigidity  Musculoskeletal: no deformity.  No asymmetry of extremities  Neuro: Alert, no facial droop, no focal motor deficits.  Speech is clear  Skin: warm, dry.    Assessment and Plan:  Dizziness /strokelike symptoms: CTA head and neck studies completed in the ER.  There is a finding of new age-indeterminate infarct in the medial aspect of the right occipital lobe since prior study.  MRI brain is ordered.  Continue home aspirin, statin and Plavix.  Neurochecks every 4 hours.  Neurology consult.  PT consult.    Hypertension: Monitor vital signs.  Holding home medications for now    Alcoholism: [facility] protocol, fall precautions

## 2024-12-30 NOTE — PLAN OF CARE
Goal Outcome Evaluation:  Plan of Care Reviewed With: patient           Outcome Evaluation: Protocol orders received. Patient passed RN swallow screen. MRI negative for acute infarct. SLP to sign off. Please placed signed orders if further assessment warranted.

## 2024-12-30 NOTE — PROGRESS NOTES
"ED OBSERVATION PROGRESS/DISCHARGE SUMMARY    Date of Admission: 12/29/2024   LOS: 0 days   PCP: Anika Banuelos APRN    Final Diagnosis TIA      Subjective     Hospital Outcome:   Stephane Jaimes is a 54 y.o. male with past medical history of left cerebellar ischemic stroke with brain compression/dysphagia in April 2024, hypertension, and alcoholism who presented to the ED with multiple complaints.  Patient complained of left-sided general weakness as well as numbness that started on the morning of 12/29/2024 and has been unchanged since that time.  Patient reports that he did have a fall on the evening of 12/28/2024 because he felt dizzy, described as the room spinning.  Patient reports that he was walking outside to \"I late really like to rain and just wanted to enjoy the rain\".  Patient reports that he primarily only has the dizziness and unstable gait when he is going up and down stairs but does not notice it any other time.  Patient reports that he did hit his head on the left side but did not lose consciousness and denies any nausea or vomiting or headaches associated with this fall.  He reports the dizziness has been present since August.  Patient reports that he had 2 shots of liquor today, when asked how much he drinks daily he reports \"I used to drink a lot but not as much now\", but not able to give quantitative amount.  Patient denies any change in his hearing, headaches, chest pain, dyspnea, abdominal pain, nausea or vomiting, change in bowels, change in vision, slurred speech, dysphagia, or recent travel or sick contacts.  Patient denies missing any of his home meds.      service used throughout exam #[id number]; of note  had to repeat questions multiple times for the patient because he was requiring redirection throughout the interview.  Patient is overall a very poor historian and no family at bedside.  Patient is noted to have an BAL of 190 on admission.    12/30/24:    MRI of " brain reported no evidence of acute intracranial pathology. Remote ischemic injury of the left cerebellum and dick. Patient complained only of mild left arm numbness. Vital signs stable. Patient observed playing a game on his phone.  Neurology saw and evaluated the patient and suspects that his symptoms are either an exacerbation of his previous stroke versus alcohol abuse.  Patient did have abnormal coags during his stroke admission in April and neurology does recommend getting heme-onc consult to evaluate for the need for anticoagulation versus antiplatelet therapy for the patient.  Heme-onc consulted pending their recommendations.  Plan for the patient stay for another night and will reevaluate in the a.m.     iPad  used throughout exam #[id number]        ROS:  General: no fevers, chills  Respiratory: no cough, dyspnea  Cardiovascular: no chest pain, palpitations  Abdomen: No abdominal pain, nausea, vomiting, or diarrhea  Neurologic: No focal weakness    Objective   Physical Exam:  I have reviewed the vital signs.  Temp:  [97.7 °F (36.5 °C)-98.4 °F (36.9 °C)] 97.9 °F (36.6 °C)  Heart Rate:  [] 72  Resp:  [16-18] 18  BP: (109-150)/() 109/83  General Appearance:    Alert, cooperative, no distress  Head:    Normocephalic, atraumatic  Eyes:    Sclerae anicteric, EOMI  Neck:   Supple, nontender  Lungs: Clear to auscultation bilaterally, respirations unlabored on room air  Heart: Regular rate and rhythm, S1 and S2 normal, no murmur, rub or gallop  Abdomen:  Soft, nontender, bowel sounds active, nondistended  Extremities: No clubbing, cyanosis, or edema to lower extremities  Pulses:  2+ and symmetric in distal lower extremities  Skin: No rashes   Neurologic: Oriented x3, Normal strength to extremities    Results Review:    I have reviewed the labs, radiology results and diagnostic studies.    Results from last 7 days   Lab Units 12/29/24  1256   WBC 10*3/mm3 11.87*   HEMOGLOBIN g/dL 15.9   HEMATOCRIT  % 46.1   PLATELETS 10*3/mm3 408     Results from last 7 days   Lab Units 12/29/24  1256   SODIUM mmol/L 145   POTASSIUM mmol/L 3.4*   CHLORIDE mmol/L 107   CO2 mmol/L 22.0   BUN mg/dL 10   CREATININE mg/dL 0.87   CALCIUM mg/dL 8.8   BILIRUBIN mg/dL 0.3   ALK PHOS U/L 153*   ALT (SGPT) U/L 26   AST (SGOT) U/L 19   GLUCOSE mg/dL 100*     Imaging Results (Last 24 Hours)       Procedure Component Value Units Date/Time    CT Angiogram Head [[id number]] Collected: 12/29/24 1607     Updated: 12/29/24 1648    Narrative:      CT ANGIOGRAM OF THE HEAD AND NECK WITH CONTRAST     CLINICAL HISTORY: Headache, left arm and leg weakness.     TECHNIQUE: CT angiogram of the head and neck was obtained with 1 mm  axial images following the administration of IV contrast. Sagittal,  coronal, and 3-dimensional reconstructed images were obtained.  Additionally, a CT scan of the head was obtained with 3 mm axial images  before and after the administration of IV contrast.     COMPARISON: CT head dated 04/12/2024 and CTA of the head and neck dated  04/09/2024.     FINDINGS:     CTA NECK:     Aortic Arch: Classic configuration.  Subclavian Arteries: Unremarkable.  Vertebral Arteries: Severe stenosis of the left vertebral artery at its  origin. The previously identified filling defect within the proximal  aspect of the left vertebral artery is no longer identified. Due to the  presence of this filling defect, the origin of the left vertebral artery  was not well characterized on the prior study. The nondominant right  vertebral artery is occluded at its origin and reconstitutes at the  level of the distal V2 segment at the C3 level. Similar findings were  noted on the prior examination.  CCA/ICA's: No significant NASCET stenosis within either internal carotid  artery.     CTA HEAD:     Anterior Circulation: Unremarkable appearance of the internal carotid  arteries, middle cerebral arteries, and anterior cerebral arteries.  Posterior Circulation:  Dominant left vertebral artery. Otherwise,  unremarkable appearance of the vertebral arteries, basilar artery, and  posterior cerebral arteries.     Ancillary Head Findings: Chronic infarct involving the left superior  cerebellar artery and left PICA distributions with a focus of  encephalomalacia noted measuring up to approximately 5.1 x 3.8 cm in  greatest axial dimensions. Interval evolutionary change is seen since  the prior examination dated 04/12/2024. Known chronic pontine infarcts  are not well delineated on this examination. There is an  age-indeterminate infarct within the medial aspect of the right  occipital lobe which measures up to approximately 1.8 x 1.0 cm in  greatest axial dimensions and is within the right PCA distribution. This  abnormality is new since the previous examination.       Impression:         There is a large chronic infarct involving the left cerebellar  hemisphere within the left superior cerebellar artery and left PICA  distributions that demonstrates interval evolutionary change since the  previous exam. Known chronic pontine infarction not well delineated on  this study. There is an age-indeterminate infarct within the medial  aspect of the right occipital lobe within the right PCA distribution  which is new since the prior study dated 04/12/2024. This could be  further temporally characterized on MR imaging.     The previously identified filling defect compatible with intraluminal  thrombus within the V1 segment of the left vertebral artery is no longer  appreciated. There is a severe stenosis at the origin of the left  vertebral artery. The origin of the left vertebral artery was not well  characterized on the prior study due to the presence of this thrombus.  The right vertebral artery is again noted to be occluded at its origin  and reconstitutes at the level of the distal V2 segment and similar  findings were noted on the prior exam.     These findings were discussed with  Dr. Long on 12/29/2024 at  approximately 3:37 p.m.        Radiation dose reduction techniques were utilized, including automated  exposure control and exposure modulation based on body size.     This report was finalized on 12/29/2024 4:45 PM by Dr. Geoff Barrera M.D  on Workstation: [id number]       CT Angiogram Neck [[id number]] Collected: 12/29/24 1607     Updated: 12/29/24 1648    Narrative:      CT ANGIOGRAM OF THE HEAD AND NECK WITH CONTRAST     CLINICAL HISTORY: Headache, left arm and leg weakness.     TECHNIQUE: CT angiogram of the head and neck was obtained with 1 mm  axial images following the administration of IV contrast. Sagittal,  coronal, and 3-dimensional reconstructed images were obtained.  Additionally, a CT scan of the head was obtained with 3 mm axial images  before and after the administration of IV contrast.     COMPARISON: CT head dated 04/12/2024 and CTA of the head and neck dated  04/09/2024.     FINDINGS:     CTA NECK:     Aortic Arch: Classic configuration.  Subclavian Arteries: Unremarkable.  Vertebral Arteries: Severe stenosis of the left vertebral artery at its  origin. The previously identified filling defect within the proximal  aspect of the left vertebral artery is no longer identified. Due to the  presence of this filling defect, the origin of the left vertebral artery  was not well characterized on the prior study. The nondominant right  vertebral artery is occluded at its origin and reconstitutes at the  level of the distal V2 segment at the C3 level. Similar findings were  noted on the prior examination.  CCA/ICA's: No significant NASCET stenosis within either internal carotid  artery.     CTA HEAD:     Anterior Circulation: Unremarkable appearance of the internal carotid  arteries, middle cerebral arteries, and anterior cerebral arteries.  Posterior Circulation: Dominant left vertebral artery. Otherwise,  unremarkable appearance of the vertebral arteries, basilar artery,  and  posterior cerebral arteries.     Ancillary Head Findings: Chronic infarct involving the left superior  cerebellar artery and left PICA distributions with a focus of  encephalomalacia noted measuring up to approximately 5.1 x 3.8 cm in  greatest axial dimensions. Interval evolutionary change is seen since  the prior examination dated 04/12/2024. Known chronic pontine infarcts  are not well delineated on this examination. There is an  age-indeterminate infarct within the medial aspect of the right  occipital lobe which measures up to approximately 1.8 x 1.0 cm in  greatest axial dimensions and is within the right PCA distribution. This  abnormality is new since the previous examination.       Impression:         There is a large chronic infarct involving the left cerebellar  hemisphere within the left superior cerebellar artery and left PICA  distributions that demonstrates interval evolutionary change since the  previous exam. Known chronic pontine infarction not well delineated on  this study. There is an age-indeterminate infarct within the medial  aspect of the right occipital lobe within the right PCA distribution  which is new since the prior study dated 04/12/2024. This could be  further temporally characterized on MR imaging.     The previously identified filling defect compatible with intraluminal  thrombus within the V1 segment of the left vertebral artery is no longer  appreciated. There is a severe stenosis at the origin of the left  vertebral artery. The origin of the left vertebral artery was not well  characterized on the prior study due to the presence of this thrombus.  The right vertebral artery is again noted to be occluded at its origin  and reconstitutes at the level of the distal V2 segment and similar  findings were noted on the prior exam.     These findings were discussed with Dr. Long on 12/29/2024 at  approximately 3:37 p.m.        Radiation dose reduction techniques were utilized,  including automated  exposure control and exposure modulation based on body size.     This report was finalized on 12/29/2024 4:45 PM by Dr. Geoff Barrera M.D  on Workstation: [id number]               I have reviewed the medications.     Discharge Medications        Continue These Medications        Instructions Start Date   amLODIPine 10 MG tablet  Commonly known as: NORVASC   10 mg, Oral, Every 24 Hours Scheduled      Aspirin Low Dose 81 MG chewable tablet  Generic drug: aspirin   81 mg, Oral, Daily      atorvastatin 80 MG tablet  Commonly known as: LIPITOR   80 mg, Oral, Nightly      clopidogrel 75 MG tablet  Commonly known as: PLAVIX   75 mg, Oral, Daily      folic acid 1 MG tablet  Commonly known as: FOLVITE   1 mg, Oral, Daily      thiamine 100 MG tablet  Commonly known as: VITAMIN B1   100 mg, Oral, Daily              ---------------------------------------------------------------------------------------------  Assessment & Plan   Assessment/Problem List    TIA (transient ischemic attack)      Plan:    TIA rule out:  -CTA head and neck obtained and actually showing interval evolutionary changes of his previous known left cerebellar and left PICA stroke, as well as and new age indeterminate infarct within the medial aspect of the right occipital lobe within the right PCA distribution that is new since April 12, 2024.  -CTA head and neck also shows that the filling defect compatible with a intraluminal thrombus within the P1 segment of the left vertebral artery is no longer appreciated, there is severe stenosis at the origin of the left vertebral artery (unclear if this is changed given that the origin of the left vertebral artery was not well-characterized on previous study secondary to the presence of the thrombus) and the right vertebral artery is again noted to be occluded at its origin and reconstitutes at the level of the distal V2 segment  -MRI brain negative for new stroke  -Continue home aspirin  statin and Plavix  -Neurology suspects that this is exacerbation of previous stroke symptoms versus alcohol abuse; recommends getting heme-onc consult for abnormal coags in April with his original stroke and discussing anticoagulation versus antiplatelet  -Pending heme-onc recommendations     Alcoholism:  -Patient continues to drink despite being counseled on cessation back in April  -CIWA with as needed and scheduled benzos ordered  -Fall precautions     Chronic hypertension:  -Permissive hypertension at this time, holding home meds        VTE Prophylaxis:  Mechanical VTE prophylaxis orders are present.       Disposition: Dependent on hospital course    Follow-up after Discharge: Dependent on hospital course    This note will serve as a progress note    Yolanda Ramsey PA-C 12/30/24 07:25 EST    I have worn appropriate PPE during this patient encounter, sanitized my hands both with entering and exiting patient's room.      57 minutes has been spent by [facility] providers in the care of this patient while under observation status

## 2024-12-30 NOTE — PLAN OF CARE
Goal Outcome Evaluation:  Plan of Care Reviewed With: patient        Progress: improving  Outcome Evaluation: Pt without new complaints overnight. MRI obtained, neurology following. CIWA remains <4 entire shift, scheduled PO valium ordered.

## 2024-12-30 NOTE — PLAN OF CARE
Problem: Adult Inpatient Plan of Care  Goal: Plan of Care Review  Outcome: Progressing  Goal: Patient-Specific Goal (Individualized)  Outcome: Progressing  Flowsheets (Taken 12/30/2024 1723)  Individualized Care Needs: Pt. was admitted for TIA, and fall. abnormal lab was found, hematology on board.  Anxieties, Fears or Concerns: Pt. reported decreased anxiety.  Goal: Absence of Hospital-Acquired Illness or Injury  Outcome: Progressing  Intervention: Identify and Manage Fall Risk  Recent Flowsheet Documentation  Taken 12/30/2024 1600 by Manan Capps RN  Safety Promotion/Fall Prevention:   activity supervised   fall prevention program maintained   lighting adjusted   safety round/check completed  Taken 12/30/2024 1400 by Manan Capps RN  Safety Promotion/Fall Prevention:   activity supervised   fall prevention program maintained   lighting adjusted   safety round/check completed  Taken 12/30/2024 1200 by Manan Capps RN  Safety Promotion/Fall Prevention:   activity supervised   fall prevention program maintained   lighting adjusted   safety round/check completed  Taken 12/30/2024 1000 by Manan Capps RN  Safety Promotion/Fall Prevention:   activity supervised   fall prevention program maintained   lighting adjusted   safety round/check completed  Taken 12/30/2024 0830 by Manan Capps RN  Safety Promotion/Fall Prevention:   activity supervised   fall prevention program maintained   lighting adjusted   safety round/check completed  Intervention: Prevent Skin Injury  Recent Flowsheet Documentation  Taken 12/30/2024 1400 by Manan Capps RN  Body Position: position changed independently  Taken 12/30/2024 0830 by Manan Capps RN  Body Position: position changed independently  Intervention: Prevent and Manage VTE (Venous Thromboembolism) Risk  Recent Flowsheet Documentation  Taken 12/30/2024 0830 by Manan Capps RN  VTE Prevention/Management: compression stockings on  Intervention: Prevent Infection  Recent  Flowsheet Documentation  Taken 12/30/2024 1600 by Manan Capps RN  Infection Prevention:   hand hygiene promoted   single patient room provided   rest/sleep promoted  Taken 12/30/2024 1400 by Manan Capps RN  Infection Prevention:   hand hygiene promoted   single patient room provided   rest/sleep promoted  Taken 12/30/2024 1200 by Manan Capps RN  Infection Prevention:   hand hygiene promoted   single patient room provided   rest/sleep promoted  Taken 12/30/2024 1000 by Manan Capps RN  Infection Prevention:   hand hygiene promoted   single patient room provided   rest/sleep promoted  Taken 12/30/2024 0830 by Manan Capps RN  Infection Prevention:   hand hygiene promoted   single patient room provided   rest/sleep promoted  Goal: Optimal Comfort and Wellbeing  Outcome: Progressing  Intervention: Monitor Pain and Promote Comfort  Recent Flowsheet Documentation  Taken 12/30/2024 0830 by Manan Capps RN  Pain Management Interventions:   care clustered   diversional activity provided  Intervention: Provide Person-Centered Care  Recent Flowsheet Documentation  Taken 12/30/2024 0830 by Manan Capps RN  Trust Relationship/Rapport:   care explained   questions answered   questions encouraged  Goal: Readiness for Transition of Care  Outcome: Progressing     Problem: Fall Injury Risk  Goal: Absence of Fall and Fall-Related Injury  Outcome: Progressing  Intervention: Identify and Manage Contributors  Recent Flowsheet Documentation  Taken 12/30/2024 1600 by Manan Capps RN  Medication Review/Management: medications reviewed  Taken 12/30/2024 1400 by Manan Capps RN  Medication Review/Management: medications reviewed  Taken 12/30/2024 1200 by Manan Capps, RN  Medication Review/Management: medications reviewed  Taken 12/30/2024 1000 by Manan Capps, RN  Medication Review/Management: medications reviewed  Taken 12/30/2024 0830 by Manan Capps, RN  Medication Review/Management: medications  reviewed  Intervention: Promote Injury-Free Environment  Recent Flowsheet Documentation  Taken 12/30/2024 1600 by Manan Capps RN  Safety Promotion/Fall Prevention:   activity supervised   fall prevention program maintained   lighting adjusted   safety round/check completed  Taken 12/30/2024 1400 by Manan Capps RN  Safety Promotion/Fall Prevention:   activity supervised   fall prevention program maintained   lighting adjusted   safety round/check completed  Taken 12/30/2024 1200 by Manan Capps RN  Safety Promotion/Fall Prevention:   activity supervised   fall prevention program maintained   lighting adjusted   safety round/check completed  Taken 12/30/2024 1000 by Manan Capps RN  Safety Promotion/Fall Prevention:   activity supervised   fall prevention program maintained   lighting adjusted   safety round/check completed  Taken 12/30/2024 0830 by Manan Capps RN  Safety Promotion/Fall Prevention:   activity supervised   fall prevention program maintained   lighting adjusted   safety round/check completed  Goal: Absence of Fall and Fall-Related Injury  Outcome: Progressing  Intervention: Identify and Manage Contributors  Recent Flowsheet Documentation  Taken 12/30/2024 1600 by Manan Capps RN  Medication Review/Management: medications reviewed  Taken 12/30/2024 1400 by Manan Capps RN  Medication Review/Management: medications reviewed  Taken 12/30/2024 1200 by Manan Capps RN  Medication Review/Management: medications reviewed  Taken 12/30/2024 1000 by Manan Capps RN  Medication Review/Management: medications reviewed  Taken 12/30/2024 0830 by Manan Capps RN  Medication Review/Management: medications reviewed  Intervention: Promote Injury-Free Environment  Recent Flowsheet Documentation  Taken 12/30/2024 1600 by Manan Capps RN  Safety Promotion/Fall Prevention:   activity supervised   fall prevention program maintained   lighting adjusted   safety round/check completed  Taken 12/30/2024  1400 by Manan Capps RN  Safety Promotion/Fall Prevention:   activity supervised   fall prevention program maintained   lighting adjusted   safety round/check completed  Taken 12/30/2024 1200 by Manan Capps RN  Safety Promotion/Fall Prevention:   activity supervised   fall prevention program maintained   lighting adjusted   safety round/check completed  Taken 12/30/2024 1000 by Manan Capps RN  Safety Promotion/Fall Prevention:   activity supervised   fall prevention program maintained   lighting adjusted   safety round/check completed  Taken 12/30/2024 0830 by Manan Capps RN  Safety Promotion/Fall Prevention:   activity supervised   fall prevention program maintained   lighting adjusted   safety round/check completed     Problem: Skin Injury Risk Increased  Goal: Skin Health and Integrity  Outcome: Progressing  Intervention: Optimize Skin Protection  Recent Flowsheet Documentation  Taken 12/30/2024 1600 by Manan Capps RN  Activity Management: back to bed  Taken 12/30/2024 1400 by Manan Capps RN  Activity Management: back to bed  Head of Bed (HOB) Positioning: HOB elevated  Taken 12/30/2024 1200 by Manan Capps RN  Activity Management: back to bed  Taken 12/30/2024 1000 by Manan Capps RN  Activity Management: back to bed  Taken 12/30/2024 0830 by Manan Capps RN  Activity Management: back to bed  Head of Bed (HOB) Positioning: HOB elevated     Problem: Alcohol Withdrawal  Goal: Optimal Neurologic Function  Outcome: Progressing     Problem: Stroke, Ischemic (Includes Transient Ischemic Attack)  Goal: Optimal Cerebral Tissue Perfusion  Outcome: Progressing  Goal: Optimal Functional Ability  Outcome: Progressing  Intervention: Optimize Functional Ability  Recent Flowsheet Documentation  Taken 12/30/2024 1600 by Manan Capps, RN  Activity Management: back to bed  Taken 12/30/2024 1400 by Manan Capps, RN  Activity Management: back to bed  Taken 12/30/2024 1200 by Manan Capps, RN  Activity  Management: back to bed  Taken 12/30/2024 1000 by Manan Capps, RN  Activity Management: back to bed  Taken 12/30/2024 0830 by Manan Capps, RN  Activity Management: back to bed  Goal: Improved Sensorimotor Function  Outcome: Progressing  Intervention: Optimize Range of Motion, Motor Control and Function  Recent Flowsheet Documentation  Taken 12/30/2024 1400 by Manan Capps, JANESSA  Positioning/Transfer Devices:   pillows   in use  Taken 12/30/2024 0830 by Manan Capps, JANESSA  Positioning/Transfer Devices:   pillows   in use  Range of Motion: active ROM (range of motion) encouraged   Goal Outcome Evaluation:

## 2024-12-30 NOTE — PROGRESS NOTES
The SHARRON and I have discussed this patient's history, physical exam and treatment plan.  I provided a substantive portion of the care of this patient.  I have reviewed the documentation and personally had a face to face interaction with the patient and personally performed the physical exam, in its entirety.  I affirm the documentation and agree with the treatment and plan.  The following describes my personal findings.  SHARED VISIT: This visit was performed by BOTH a physician and an APC. The substantive portion of the medical decision making was performed by this attesting physician who made or approved the management plan and takes responsibility for patient management. All studies in the APC note (if performed) were independently interpreted by me.       The patient is admitted for that to the observation unit for further evaluation of left upper extremity pain/numbness/weakness.  Patient denies weakness, numbness of face, lower extremity, visual disturbance, speech disturbance, chest pain, shortness of air nausea, vomiting, abdominal pain.  Patient reports he occasionally gets dizzy when he climbs many stairs, denies unremittent dizziness, passing out, palpitations.    Comprehensive Physical exam:  Patient is nontoxic appearing oriented, conversant awake, alert  HEENT: normocephalic, atraumatic  Neck: no goiter, no pain with ROM  Pulmonary: Nontachypneic  cardiovascular: Nontachycardic  Abdomen: Soft, nontender  musculoskeletal: Good range of motion x 4, patient reports some tenderness to palpation over the left elbow, no significant deformity, warmth, erythema, swelling, distal pulses, , wrist flexion extension, elbow flexion extension, shoulder AB adduction intact.  Neuro/psychiatric:calm, appropriate, cooperative  Skin:warm, dry      Plan:     TIA rule out:  -CTA head and neck obtained and actually showing interval evolutionary changes of his previous known left cerebellar and left PICA stroke, as well  as and new age indeterminate infarct within the medial aspect of the right occipital lobe within the right PCA distribution that is new since April 12, 2024.  -CTA head and neck also shows that the filling defect compatible with a intraluminal thrombus within the P1 segment of the left vertebral artery is no longer appreciated, there is severe stenosis at the origin of the left vertebral artery (unclear if this is changed given that the origin of the left vertebral artery was not well-characterized on previous study secondary to the presence of the thrombus) and the right vertebral artery is again noted to be occluded at its origin and reconstitutes at the level of the distal V2 segment  -MRI brain negative for acute CVA  -Continue home aspirin statin and Plavix  -Neurology consulted, seen by Dr. Nicole who reports no CVA, low suspicion for TIA, reports recrudescence of previous stroke is a possibility, given patient reports compliance with his aspirin/Plavix/statin, he request hematology consult given patient with history of abnormal coagulopathy labs in the past to ascertain whether they would recommend patient be transition to oral anticoagulants as opposed to antiplatelet therapy prior to discharge  -PT consulted  -Neurochecks per protocol     Alcoholism:  -Patient continues to drink despite being counseled on cessation back in April.  Patient reports he only occasionally drinks alcohol 1-2 times a week upon questioning this morning  -CIWA with as needed and scheduled benzos ordered  -Fall precautions     Chronic hypertension:  -Permissive hypertension at this time, holding home meds

## 2024-12-31 ENCOUNTER — APPOINTMENT (OUTPATIENT)
Dept: CARDIOLOGY | Facility: HOSPITAL | Age: 54
End: 2024-12-31

## 2024-12-31 ENCOUNTER — READMISSION MANAGEMENT (OUTPATIENT)
Dept: CALL CENTER | Facility: HOSPITAL | Age: 54
End: 2024-12-31

## 2024-12-31 ENCOUNTER — APPOINTMENT (OUTPATIENT)
Dept: GENERAL RADIOLOGY | Facility: HOSPITAL | Age: 54
End: 2024-12-31

## 2024-12-31 VITALS
DIASTOLIC BLOOD PRESSURE: 72 MMHG | HEIGHT: 67 IN | WEIGHT: 175.5 LBS | BODY MASS INDEX: 27.55 KG/M2 | TEMPERATURE: 97.7 F | OXYGEN SATURATION: 94 % | HEART RATE: 58 BPM | RESPIRATION RATE: 16 BRPM | SYSTOLIC BLOOD PRESSURE: 99 MMHG

## 2024-12-31 DIAGNOSIS — G45.9 TIA (TRANSIENT ISCHEMIC ATTACK): Primary | ICD-10-CM

## 2024-12-31 DIAGNOSIS — I63.9 CEREBELLAR STROKE: Primary | ICD-10-CM

## 2024-12-31 LAB
TSH SERPL DL<=0.05 MIU/L-ACNC: 2.61 UIU/ML (ref 0.27–4.2)
VIT B12 BLD-MCNC: 305 PG/ML (ref 211–946)

## 2024-12-31 PROCEDURE — 96376 TX/PRO/DX INJ SAME DRUG ADON: CPT

## 2024-12-31 PROCEDURE — 73070 X-RAY EXAM OF ELBOW: CPT

## 2024-12-31 PROCEDURE — 85705 THROMBOPLASTIN INHIBITION: CPT | Performed by: INTERNAL MEDICINE

## 2024-12-31 PROCEDURE — 97530 THERAPEUTIC ACTIVITIES: CPT

## 2024-12-31 PROCEDURE — 85732 THROMBOPLASTIN TIME PARTIAL: CPT | Performed by: INTERNAL MEDICINE

## 2024-12-31 PROCEDURE — 86147 CARDIOLIPIN ANTIBODY EA IG: CPT | Performed by: INTERNAL MEDICINE

## 2024-12-31 PROCEDURE — G0378 HOSPITAL OBSERVATION PER HR: HCPCS

## 2024-12-31 PROCEDURE — 84443 ASSAY THYROID STIM HORMONE: CPT | Performed by: NURSE PRACTITIONER

## 2024-12-31 PROCEDURE — 85670 THROMBIN TIME PLASMA: CPT | Performed by: INTERNAL MEDICINE

## 2024-12-31 PROCEDURE — 99214 OFFICE O/P EST MOD 30 MIN: CPT | Performed by: NURSE PRACTITIONER

## 2024-12-31 PROCEDURE — 99254 IP/OBS CNSLTJ NEW/EST MOD 60: CPT | Performed by: INTERNAL MEDICINE

## 2024-12-31 PROCEDURE — 85613 RUSSELL VIPER VENOM DILUTED: CPT | Performed by: INTERNAL MEDICINE

## 2024-12-31 PROCEDURE — 97110 THERAPEUTIC EXERCISES: CPT

## 2024-12-31 PROCEDURE — 86146 BETA-2 GLYCOPROTEIN ANTIBODY: CPT | Performed by: INTERNAL MEDICINE

## 2024-12-31 PROCEDURE — 82607 VITAMIN B-12: CPT | Performed by: NURSE PRACTITIONER

## 2024-12-31 PROCEDURE — 93246 EXT ECG>7D<15D RECORDING: CPT

## 2024-12-31 PROCEDURE — 25010000002 THIAMINE HCL 200 MG/2ML SOLUTION: Performed by: EMERGENCY MEDICINE

## 2024-12-31 RX ADMIN — ASPIRIN 81 MG: 81 TABLET, CHEWABLE ORAL at 09:39

## 2024-12-31 RX ADMIN — Medication 10 ML: at 09:39

## 2024-12-31 RX ADMIN — DIAZEPAM 10 MG: 5 TABLET ORAL at 09:39

## 2024-12-31 RX ADMIN — CLOPIDOGREL BISULFATE 75 MG: 75 TABLET ORAL at 09:39

## 2024-12-31 RX ADMIN — THIAMINE HYDROCHLORIDE 200 MG: 100 INJECTION, SOLUTION INTRAMUSCULAR; INTRAVENOUS at 05:00

## 2024-12-31 RX ADMIN — FOLIC ACID 1 MG: 1 TABLET ORAL at 09:39

## 2024-12-31 NOTE — PLAN OF CARE
Goal Outcome Evaluation:   Patient meets discharge criteria. Patient to get KYLE and follow up with cardiology outpatient. Patient verbalizes understanding of discharge instruction.

## 2024-12-31 NOTE — DISCHARGE SUMMARY
"ED OBSERVATION PROGRESS/DISCHARGE SUMMARY    Date of Admission: 12/29/2024   LOS: 0 days   PCP: Anika Banuelos APRN    Final Diagnosis: Chronic stroke exacerbation versus alcohol intoxication, large chronic cerebellar infarct, left vertebral artery stenosis, right vertebral artery occlusion at origin    Hospital Outcome    Stephane Jaimes is a 54 y.o. male with past medical history of left cerebellar ischemic stroke with brain compression/dysphagia in April 2024, hypertension, and alcoholism who presented to the ED with multiple complaints.  Patient complained of left-sided general weakness as well as numbness that started on the morning of 12/29/2024 and has been unchanged since that time.  Patient reports that he did have a fall on the evening of 12/28/2024 because he felt dizzy, described as the room spinning.  Patient reports that he was walking outside to \"I late really like to rain and just wanted to enjoy the rain\".  Patient reports that he primarily only has the dizziness and unstable gait when he is going up and down stairs but does not notice it any other time.  Patient reports that he did hit his head on the left side but did not lose consciousness and denies any nausea or vomiting or headaches associated with this fall.  He reports the dizziness has been present since August.  Patient reports that he had 2 shots of liquor today, when asked how much he drinks daily he reports \"I used to drink a lot but not as much now\", but not able to give quantitative amount.  Patient denies any change in his hearing, headaches, chest pain, dyspnea, abdominal pain, nausea or vomiting, change in bowels, change in vision, slurred speech, dysphagia, or recent travel or sick contacts.  Patient denies missing any of his home meds.      service used throughout exam #[id number]; of note  had to repeat questions multiple times for the patient because he was requiring redirection throughout the interview.  " Patient is overall a very poor historian and no family at bedside.  Patient is noted to have an BAL of 190 on admission.     12/30/24   MRI of brain reported no evidence of acute intracranial pathology. Remote ischemic injury of the left cerebellum and dick. Patient complained only of mild left arm numbness. Vital signs stable. Patient observed playing a game on his phone.  Neurology saw and evaluated the patient and suspects that his symptoms are either an exacerbation of his previous stroke versus alcohol abuse.  Patient did have abnormal coags during his stroke admission in April and neurology does recommend getting heme-onc consult to evaluate for the need for anticoagulation versus antiplatelet therapy for the patient.  Heme-onc consulted pending their recommendations.  Plan for the patient stay for another night and will reevaluate in the a.m.      iPad  used throughout exam #[id number]    12/31/24  No new events overnight. Vital signs stable. Hematology's note recommends continued home therapy of ASA and Plavix. Follow up in the office to review antiphospholipid studies in the next few weeks.    Cardiology was consulted for KYLE.  Patient would prefer to have this done as an outpatient.  They will arrange.      Access was consulted, however patient is anxious to discharge.  He was provided with resources for alcohol abuse.  ZIO was placed prior to discharge.  He has a follow-up scheduled with neurology on 1/14/2025.  We will continue his aspirin and statin.  Stopping Plavix per neurology's recommendation, spoke with Dr. Schmitz.     ROS:  General: no fevers, chills  Respiratory: no cough, dyspnea  Cardiovascular: no chest pain, palpitations  Abdomen: No abdominal pain, nausea, vomiting, or diarrhea  Neurologic: No focal weakness    Objective   Physical Exam:  I have reviewed the vital signs.  Temp:  [97.6 °F (36.4 °C)-97.7 °F (36.5 °C)] 97.7 °F (36.5 °C)  Heart Rate:  [58-77] 58  Resp:  [16] 16  BP:  ()/(65-94) 99/72  General Appearance:    Alert, cooperative, no distress  Head:    Normocephalic, atraumatic  Eyes:    Sclerae anicteric  Neck:   Supple, no mass  Lungs: Clear to auscultation bilaterally, respirations unlabored  Heart: Regular rate and rhythm, S1 and S2 normal, no murmur, rub or gallop  Abdomen:  Soft, nontender, bowel sounds active, nondistended  Extremities: No clubbing, cyanosis, or edema to lower extremities  Pulses:  2+ and symmetric in distal lower extremities  Skin: No rashes   Neurologic: Oriented x3, Normal strength to extremities    Results Review:    I have reviewed the labs, radiology results and diagnostic studies.    Results from last 7 days   Lab Units 12/30/24  0408   WBC 10*3/mm3 11.03*   HEMOGLOBIN g/dL 14.7   HEMATOCRIT % 43.9   PLATELETS 10*3/mm3 355     Results from last 7 days   Lab Units 12/30/24  0408 12/29/24  1256   SODIUM mmol/L 136 145   POTASSIUM mmol/L 3.8 3.4*   CHLORIDE mmol/L 105 107   CO2 mmol/L 22.7 22.0   BUN mg/dL 12 10   CREATININE mg/dL 0.76 0.87   CALCIUM mg/dL 8.7 8.8   BILIRUBIN mg/dL 0.7 0.3   ALK PHOS U/L 127* 153*   ALT (SGPT) U/L 25 26   AST (SGOT) U/L 22 19   GLUCOSE mg/dL 97 100*     Imaging Results (Last 24 Hours)       Procedure Component Value Units Date/Time    XR ELBOW 2 VIEW LEFT [[id number]] Collected: 12/31/24 1231     Updated: 12/31/24 1235    Narrative:      XR ELBOW 2 VW LEFT-     INDICATIONS: Trauma.     TECHNIQUE: 2 VIEWS OF THE LEFT ELBOW     COMPARISON: None available     FINDINGS:     No acute fracture, erosion, or dislocation is identified. No elbow  effusion is noted. If there is further clinical concern, MRI could be  considered for further evaluation.       Impression:         As described.           This report was finalized on 12/31/2024 12:32 PM by Dr. Anthony Braun M.D on Workstation: [id number]               I have reviewed the medications.     Discharge Medications        Continue These Medications        Instructions  Start Date   amLODIPine 10 MG tablet  Commonly known as: NORVASC   10 mg, Oral, Every 24 Hours Scheduled      Aspirin Low Dose 81 MG chewable tablet  Generic drug: aspirin   81 mg, Oral, Daily      atorvastatin 80 MG tablet  Commonly known as: LIPITOR   80 mg, Oral, Nightly      folic acid 1 MG tablet  Commonly known as: FOLVITE   1 mg, Oral, Daily      thiamine 100 MG tablet  Commonly known as: VITAMIN B1   100 mg, Oral, Daily             Stop These Medications      clopidogrel 75 MG tablet  Commonly known as: PLAVIX             ---------------------------------------------------------------------------------------------  Assessment & Plan   Assessment/Problem List    TIA (transient ischemic attack)      Plan:    Old stroke exacerbation versus alcohol intoxication  Large chronic cerebellar infarct  Left vertebral artery stenosis  Right vertebral artery occlusion at the origin  -CTA head and neck obtained and actually showing interval evolutionary changes of his previous known left cerebellar and left PICA stroke, as well as and new age indeterminate infarct within the medial aspect of the right occipital lobe within the right PCA distribution that is new since April 12, 2024.  -CTA head and neck also shows that the filling defect compatible with a intraluminal thrombus within the P1 segment of the left vertebral artery is no longer appreciated, there is severe stenosis at the origin of the left vertebral artery (unclear if this is changed given that the origin of the left vertebral artery was not well-characterized on previous study secondary to the presence of the thrombus) and the right vertebral artery is again noted to be occluded at its origin and reconstitutes at the level of the distal V2 segment  -MRI brain negative for new stroke  -Hemoglobin A1c 5.8  -TSH 2.61, WNL  -Neurology consulted.  They suspect that this is exacerbation of previous stroke symptoms versus alcohol abuse; recommends getting heme-onc  consult for abnormal coags in April with his original stroke and discussing anticoagulation versus antiplatelet  -Continue home aspirin and statin  -Stop Plavix  -Hematology consulted per neurology request  -Follow-up in stroke clinic 1/14/25  -Cardiology consulted, they will arrange for outpatient KYLE  -Zio monitor placed at time of discharge    Abnormal hypercoagulation panel  -Hematology consulted  -They recommend continuing antiplatelets, no indication for anticoagulant from their standpoint  -APS send these pending at time of discharge  -Hematology to arrange for follow-up in the next few weeks    Left elbow pain post fall  -X-ray negative     Alcoholism  -Patient continues to drink despite being counseled on cessation back in April  -CIWA with as needed and scheduled benzos ordered  -Fall precautions     Chronic hypertension  -Continue home regimen    Disposition: Home    Follow-up after Discharge:  Follow up in the office to review antiphospholipid studies in the next few weeks.   Follow-up with the stroke clinic in 6 to 8 weeks. MA contacted to arrange follow-up.     This note will serve as a discharge summary    Arlet Mohan, APRN 12/31/24 13:22 EST    I have worn appropriate PPE during this patient encounter, sanitized my hands both with entering and exiting patient's room.    34 minutes has been spent by [facility] providers in the care of this patient while under observation status

## 2024-12-31 NOTE — DISCHARGE INSTRUCTIONS
Continue aspirin, Plavix and atorvastatin    You need to a KYLE, cardiology should contact you with an appointment    Follow-up with neurology in 6 to 8 weeks    Hematology to arrange follow-up as well

## 2024-12-31 NOTE — THERAPY TREATMENT NOTE
Patient Name: Stephane Jaimes  : 1970    MRN: [id number]                              Today's Date: 2024       Admit Date: 2024    Visit Dx:     ICD-10-CM ICD-9-CM   1. Acute left-sided weakness  R53.1 728.87   2. History of CVA (cerebrovascular accident)  Z86.73 V12.54   3. Uncomplicated alcohol dependence  F10.20 303.90   4. Cardiac arrhythmia, unspecified  I49.9 427.9     Patient Active Problem List   Diagnosis    Alcohol intoxication    Transaminitis    HTN (hypertension)    Cerebellar stroke    Cerebral edema    TIA (transient ischemic attack)     Past Medical History:   Diagnosis Date    H/O: CVA (cerebrovascular accident)     H/O strokes    History of prediabetes     Hypertension     Mixed hyperlipidemia      Past Surgical History:   Procedure Laterality Date    CTA HEAD NECK STROKE PROTOCOL        General Information       Row Name 24 4816          Physical Therapy Time and Intention    Document Type therapy note (daily note);discharge treatment  -     Mode of Treatment individual therapy;physical therapy  -       Row Name 24 0848          General Information    Patient Profile Reviewed yes  -     Existing Precautions/Restrictions fall  -       Row Name 24 0845          Cognition    Orientation Status (Cognition) oriented x 4  -       Row Name 24 0807          Safety Issues/Impairments Affecting Functional Mobility    Impairments Affecting Function (Mobility) balance;strength  -     Comment, Safety Issues/Impairments (Mobility) L side wkness-reports had L side weakness prior-hx of CVA  -               User Key  (r) = Recorded By, (t) = Taken By, (c) = Cosigned By      Initials Name Provider Type    Lorene Gordon PTA Physical Therapist Assistant                   Mobility       Row Name 24 0904          Bed Mobility    Supine-Sit [place] (Bed Mobility) modified independence  slight use of rail  -     Assistive Device (Bed Mobility)  head of bed elevated;bed rails  -[facility] Name 12/31/24 0920          Sit-Stand Transfer    Sit-Stand [place] (Transfers) contact guard;standby assist;verbal cues  cues to try and avoid LEs on bed with STS  -     Assistive Device (Sit-Stand Transfers) --  HHA  -[facility] Name 12/31/24 0920          Gait/Stairs (Locomotion)    [place] Level (Gait) contact guard;standby assist;verbal cues  -     Distance in Feet (Gait) 120  -     Deviations/Abnormal Patterns (Gait) christian decreased  -     Left Sided Gait Deviations heel strike decreased;hip hiking;weight shift ability decreased  decr arm swing  -     Comment, (Gait/Stairs) pt aware of his deficits and already addressed as he was being cued, pt did improve L heel strike when asked to heel-toe step and he mentioned tennis shoes VS his slides he was wearing and i agreed to improve balance  -               User Key  (r) = Recorded By, (t) = Taken By, (c) = Cosigned By      Initials Name Provider Type    Lorene Gordon PTA Physical Therapist Assistant                   Obj/Interventions       [facility] Name 12/31/24 0920          Motor Skills    Therapeutic Exercise --  LAQS, seated MIP, standing hip abd/add x10 reps ea  -               User Key  (r) = Recorded By, (t) = Taken By, (c) = Cosigned By      Initials Name Provider Type    Lorene Gordon PTA Physical Therapist Assistant                   Goals/Plan    No documentation.                  Clinical Impression       [facility] Name 12/31/24 0920          Pain    Pretreatment Pain Rating 0/10 - no pain  -     Posttreatment Pain Rating 0/10 - no pain  -JM       Row Name 12/31/24 0920          Plan of Care Review    Plan of Care Reviewed With patient  -     Progress improving  -     Outcome Evaluation Pt plans home w/roommates and then outpt PT , pt improved w/gt distance and was aware of his deficits that he was educ on last visit, pt did amb w/assist of 1 but is close to indep ,  did not introduce an AD as pt should be able to manage w/o and assist of 1 at home, did not use AD at baseline, suhail seated and standing exer for LEs x10, suhail amb ~120ft HHA-1, plans DC soon  -       Row Name 12/31/24 0920          Therapy Assessment/Plan (PT)    Rehab Potential (PT) good  -     Criteria for Skilled Interventions Met (PT) yes  -       Row Name 12/31/24 0920          Vital Signs    O2 Delivery Pre Treatment room air  -       Row Name 12/31/24 0920          Positioning and Restraints    Pre-Treatment Position in bed  -     Post Treatment Position bed  -     In Bed supine;call light within reach;encouraged to call for assist;exit alarm on;notified [facility]  -               User Key  (r) = Recorded By, (t) = Taken By, (c) = Cosigned By      Initials Name Provider Type    Lorene Gordon PTA Physical Therapist Assistant                   Outcome Measures       Row Name 12/31/24 0900 12/31/24 0800       How much help from another person do you currently need...    Turning from your back to your side while in flat bed without using bedrails? 4  - 4  -ES    Moving from lying on back to sitting on the side of a flat bed without bedrails? 4  - 4  -ES    Moving to and from a bed to a chair (including a wheelchair)? 4  - 4  -ES    Standing up from a chair using your arms (e.g., wheelchair, bedside chair)? 4  - 4  -ES    Climbing 3-5 steps with a railing? 3  - 4  -ES    To walk in hospital room? 3  - 4  -ES    AM-PAC 6 Clicks Score (PT) 22  - 24  -ES    Highest Level of Mobility Goal 7 --> Walk 25 feet or more  - 8 --> Walked 250 feet or more  -ES              User Key  (r) = Recorded By, (t) = Taken By, (c) = Cosigned By      Initials Name Provider Type    Lorene Gordon PTA Physical Therapist Assistant    Sally Christie, RN Registered Nurse                                 Physical Therapy Education       Title: PT OT SLP Therapies (Resolved)       Topic: Physical Therapy  (Resolved)       Point: Mobility training (Resolved)       Learning Progress Summary            Patient Acceptance, E,TB, VU by NK at 12/30/2024 1725    Acceptance, E, VU by  at 12/30/2024 0919                      Point: Home exercise program (Resolved)       Learning Progress Summary            Patient Acceptance, E,TB, VU by NK at 12/30/2024 1725    Acceptance, E, VU by  at 12/30/2024 0919                      Point: Body mechanics (Resolved)       Learning Progress Summary            Patient Acceptance, E,TB, VU by NK at 12/30/2024 1725    Acceptance, E, VU by  at 12/30/2024 0919                      Point: Precautions (Resolved)       Learning Progress Summary            Patient Acceptance, E,TB, VU by NK at 12/30/2024 1725    Acceptance, E, VU by  at 12/30/2024 0919                                      User Key       Initials Effective Dates Name Provider Type Discipline     05/02/22 -  Anna Cheng, PT Physical Therapist PT     11/26/24 -  Manan Capps, RN Registered Nurse Nurse                  PT Recommendation and Plan     Progress: improving  Outcome Evaluation: Pt plans home w/roommates and then outpt PT , pt improved w/gt distance and was aware of his deficits that he was educ on last visit, pt did amb w/assist of 1 but is close to indep , did not introduce an AD as pt should be able to manage w/o and assist of 1 at home, did not use AD at baseline, suhail seated and standing exer for LEs x10, suhail amb ~120ft HHA-1, plans DC soon     Time Calculation:         PT Charges       Row Name 12/31/24 0925             Time Calculation    Start Time 0855  -      Stop Time 0924  -      Time Calculation (min) 29 min  -ADRIÁN      PT Received On 12/31/24  -ADRIÁN      PT - Next Appointment 01/02/25  -                User Key  (r) = Recorded By, (t) = Taken By, (c) = Cosigned By      Initials Name Provider Type    Lorene Gordon PTA Physical Therapist Assistant                  Therapy Charges for  Today       Code Description Service Date Service Provider Modifiers Qty    82458881942 HC PT THERAPEUTIC ACT EA 15 MIN 12/31/2024 Lorene Ramsey PTA GP 1    47376285644 HC PT THER PROC EA 15 MIN 12/31/2024 Lorene Ramsey PTA GP 1            PT G-Codes  Outcome Measure Options: AM-PAC 6 Clicks Basic Mobility (PT)  AM-PAC 6 Clicks Score (PT): 22  PT Discharge Summary  Anticipated Discharge Disposition (PT): home with assist, home with outpatient therapy services    Lorene Ramsey PTA  12/31/2024

## 2024-12-31 NOTE — PROGRESS NOTES
KATE MACKEY Attestation Note    I supervised care provided by the midlevel provider.    The SHARRON and I have discussed this patient's history, physical exam, and treatment plan. I have reviewed the documentation and personally had a face to face interaction with the patient  I affirm the documentation and agree with the treatment and plan. I provided a substantive portion of the care of this patient.  I personally performed the physical exam, in its entirety.  My personal findings are documented in below:    History:  Patient admitted to observation unit for further management of left-sided weakness, numbness and dizziness.  This morning he has no new complaints.  Denies any pain.  Denies any numbness.  Denies any nausea.  Denies any vision changes.    Physical Exam:  General: No acute distress.  HENT: NCAT, moist mucous membranes  Eyes: no scleral icterus.,  EOMI  Neck: Painless range of motion  CV: Pink warm and well-perfused throughout  Respiratory: No distress or increased work of breathing  Abdomen: soft, no focal tenderness or rigidity  Musculoskeletal: no deformity.  Neuro: Alert, speech fluent and easily intelligible, no facial droop.  No motor deficits.  Skin: warm, dry.    Assessment and Plan:  Left-sided weakness/numbness/TIA rule out: Neurology consulted.  MRI brain is negative for acute ischemic injury.  Hematology/oncology consulted and did not recommend full anticoagulation.  Plan was for KYLE today, but unfortunately patient has already eaten breakfast.  We will plan to reschedule the study for tomorrow.  Continue aspirin and Plavix.

## 2024-12-31 NOTE — PROGRESS NOTES
DOS: 2024  NAME: Stephane Jaimes   : 1970  PCP: Anika Banuelos APRN    Chief Complaint   Patient presents with    Extremity Weakness    Headache        Stroke    Subjective: Pt seen in follow up, however the problem is new to me.  I used the [place] iPad  to speak with him and examined him.  States his symptoms have resolved.  His only complaint is some elbow pain since his fall.  States initially he had pain in his left hand and fingers that spread to his elbow but now just the elbow.  No family at bedside    Objective:  Vital signs:      Vitals:    24 1921 24 2344 24 0300 24 0729   BP: 117/75 107/65 107/65 99/72   BP Location: Right arm Right arm Right arm Right arm   Patient Position: Lying Lying Lying Lying   Pulse: 73 77  58   Resp: 16 16 16 16   Temp: 97.6 °F (36.4 °C) 97.6 °F (36.4 °C) 97.6 °F (36.4 °C) 97.7 °F (36.5 °C)   TempSrc: Oral Oral Oral Oral   SpO2: 94% 94%     Weight:       Height:           Current Facility-Administered Medications:     [Held by provider] amLODIPine (NORVASC) tablet 10 mg, 10 mg, Oral, Q24H, Terell Echavarria MD    aspirin chewable tablet 81 mg, 81 mg, Oral, Daily, Terell Echavarria MD, 81 mg at 24 0939    atorvastatin (LIPITOR) tablet 80 mg, 80 mg, Oral, Nightly, Terell Echavarria MD, 80 mg at 24    clopidogrel (PLAVIX) tablet 75 mg, 75 mg, Oral, Daily, Terell Echavarria MD, 75 mg at 24 0939    [COMPLETED] diazePAM (VALIUM) tablet 10 mg, 10 mg, Oral, Q6H, 10 mg at 24 0948 **FOLLOWED BY** diazePAM (VALIUM) tablet 10 mg, 10 mg, Oral, Q8H, 10 mg at 24 0939 **FOLLOWED BY** diazePAM (VALIUM) tablet 10 mg, 10 mg, Oral, Q12H **FOLLOWED BY** [START ON 2025] diazePAM (VALIUM) tablet 10 mg, 10 mg, Oral, Nightly, Terell Echavarria MD    folic acid (FOLVITE) tablet 1 mg, 1 mg, Oral, Daily, Terell Echavarria MD, 1 mg at 24 0939    LORazepam (ATIVAN) tablet 1 mg, 1 mg, Oral, Q1H PRN **OR** LORazepam  (ATIVAN) injection 1 mg, 1 mg, Intravenous, Q1H PRN **OR** LORazepam (ATIVAN) tablet 2 mg, 2 mg, Oral, Q1H PRN, 2 mg at 12/29/24 1808 **OR** LORazepam (ATIVAN) injection 2 mg, 2 mg, Intravenous, Q1H PRN **OR** LORazepam (ATIVAN) injection 2 mg, 2 mg, Intravenous, Q15 Min PRN **OR** LORazepam (ATIVAN) injection 2 mg, 2 mg, Intramuscular, Q15 Min PRN, Terell Echavarria MD    Magnesium Standard Dose Replacement - Follow Nurse / BPA Driven Protocol, , Not Applicable, PRN, Terell Echavarria MD    ondansetron (ZOFRAN) injection 4 mg, 4 mg, Intravenous, Q6H PRN, Terell Echavarria MD    [COMPLETED] Insert Peripheral IV, , , Once **AND** sodium chloride 0.9 % flush 10 mL, 10 mL, Intravenous, PRN, Terell Echavarria MD    sodium chloride 0.9 % flush 10 mL, 10 mL, Intravenous, Q12H, Terell Echavarria MD, 10 mL at 12/31/24 0939    sodium chloride 0.9 % flush 10 mL, 10 mL, Intravenous, PRN, Terell Echavarria MD    sodium chloride 0.9 % infusion 40 mL, 40 mL, Intravenous, PRN, Terell Echavarria MD    thiamine (B-1) injection 200 mg, 200 mg, Intravenous, Q8H, 200 mg at 12/31/24 0500 **FOLLOWED BY** [START ON 1/4/2025] thiamine (VITAMIN B-1) tablet 100 mg, 100 mg, Oral, Daily, Terell Echavarria MD    PRN meds    LORazepam **OR** LORazepam **OR** LORazepam **OR** LORazepam **OR** LORazepam **OR** LORazepam    Magnesium Standard Dose Replacement - Follow Nurse / BPA Driven Protocol    ondansetron    [COMPLETED] Insert Peripheral IV **AND** sodium chloride    sodium chloride    sodium chloride    No current facility-administered medications on file prior to encounter.     Current Outpatient Medications on File Prior to Encounter   Medication Sig    amLODIPine (NORVASC) 10 MG tablet Take 1 tablet by mouth Daily.    aspirin 81 MG chewable tablet Chew 1 tablet Daily.    atorvastatin (LIPITOR) 80 MG tablet Take 1 tablet by mouth Every Night.    clopidogrel (PLAVIX) 75 MG tablet Take 1 tablet by mouth Daily.    folic acid (FOLVITE) 1 MG tablet Take  "1 tablet by mouth Daily.    thiamine (VITAMIN B1) 100 MG tablet Take 1 tablet by mouth Daily.     General appearance: Elderly male, NAD, alert and cooperative  HEENT: Normocephalic, atraumatic    Neurological:   MS: oriented x3, normal attention/concentration, language intact, no neglect, normal fund of knowledge  CN: visual fields full, EOMI, facial sensation equal, no facial droop, shoulder shrug equal, tongue midline  Motor: 5/5 in all 4 ext.  Sensory: light touch and cold sensation intact in all 4 ext.  Coordination: Normal finger to nose test  Gait and station: Deferred  Rapid alternating movements: normal finger to thumb tap    Laboratory results:  No results found for: \"TSH\"  Lab Results   Component Value Date    HGBA1C 5.80 (H) 12/29/2024     No results found for: \"UTANDGEN09\"  Lab Results   Component Value Date    CHOL 158 12/29/2024    CHOL 214 (H) 04/11/2024     Lab Results   Component Value Date    TRIG 127 12/29/2024    TRIG 134 04/11/2024     Lab Results   Component Value Date    HDL 59 12/29/2024    HDL 52 04/11/2024     Lab Results   Component Value Date    LDL 77 12/29/2024     (H) 04/11/2024     Lab Results   Component Value Date    WBC 11.03 (H) 12/30/2024    HGB 14.7 12/30/2024    HCT 43.9 12/30/2024    MCV 94.2 12/30/2024     12/30/2024     Lab Results   Component Value Date    GLUCOSE 97 12/30/2024    BUN 12 12/30/2024    CREATININE 0.76 12/30/2024    BCR 15.8 12/30/2024    K 3.8 12/30/2024    CO2 22.7 12/30/2024    CALCIUM 8.7 12/30/2024    ALBUMIN 4.0 12/30/2024    AST 22 12/30/2024    ALT 25 12/30/2024     Lab Results   Component Value Date    PTT 26.3 12/30/2024     Lab Results   Component Value Date    INR 0.97 12/30/2024    INR 1.01 04/06/2024    PROTIME 13.1 12/30/2024    PROTIME 13.5 04/06/2024     Brief Urine Lab Results  (Last result in the past 365 days)        Color   Clarity   Blood   Leuk Est   Nitrite   Protein   CREAT   Urine HCG        04/06/24 2156 Yellow   " Clear   Negative   Negative   Negative   Negative                   Review and interpretation of imaging:  MRI Brain Without Contrast    Result Date: 12/30/2024  Electronically signed by Savanna Mendoza MD on 12-30-24 at 0247    CT Angiogram Head    Result Date: 12/29/2024   There is a large chronic infarct involving the left cerebellar hemisphere within the left superior cerebellar artery and left PICA distributions that demonstrates interval evolutionary change since the previous exam. Known chronic pontine infarction not well delineated on this study. There is an age-indeterminate infarct within the medial aspect of the right occipital lobe within the right PCA distribution which is new since the prior study dated 04/12/2024. This could be further temporally characterized on MR imaging.  The previously identified filling defect compatible with intraluminal thrombus within the V1 segment of the left vertebral artery is no longer appreciated. There is a severe stenosis at the origin of the left vertebral artery. The origin of the left vertebral artery was not well characterized on the prior study due to the presence of this thrombus. The right vertebral artery is again noted to be occluded at its origin and reconstitutes at the level of the distal V2 segment and similar findings were noted on the prior exam.  These findings were discussed with Dr. Long on 12/29/2024 at approximately 3:37 p.m.   Radiation dose reduction techniques were utilized, including automated exposure control and exposure modulation based on body size.  This report was finalized on 12/29/2024 4:45 PM by Dr. Geoff Barrera M.D on Workstation: [id number]      CT Angiogram Neck    Result Date: 12/29/2024   There is a large chronic infarct involving the left cerebellar hemisphere within the left superior cerebellar artery and left PICA distributions that demonstrates interval evolutionary change since the previous exam. Known chronic pontine  infarction not well delineated on this study. There is an age-indeterminate infarct within the medial aspect of the right occipital lobe within the right PCA distribution which is new since the prior study dated 04/12/2024. This could be further temporally characterized on MR imaging.  The previously identified filling defect compatible with intraluminal thrombus within the V1 segment of the left vertebral artery is no longer appreciated. There is a severe stenosis at the origin of the left vertebral artery. The origin of the left vertebral artery was not well characterized on the prior study due to the presence of this thrombus. The right vertebral artery is again noted to be occluded at its origin and reconstitutes at the level of the distal V2 segment and similar findings were noted on the prior exam.  These findings were discussed with Dr. Long on 12/29/2024 at approximately 3:37 p.m.   Radiation dose reduction techniques were utilized, including automated exposure control and exposure modulation based on body size.  This report was finalized on 12/29/2024 4:45 PM by Dr. Geoff Barrera M.D on Workstation: [id number]     Results for orders placed during the hospital encounter of 04/06/24    Adult Transthoracic Echo Complete W/ Cont if Necessary Per Protocol    Interpretation Summary    Left ventricular systolic function is hyperdynamic (EF > 70%). Calculated left ventricular EF = 70.8%    Left ventricular wall thickness is consistent with mild concentric hypertrophy.    Left ventricular diastolic function was normal.      Impression/Assessment:  This is a 54-year-old male with past medical history of strokes with the most recent hospitalization in April after he was found to have a large left cerebellar infarct, alcohol abuse, tobacco abuse, hypertension who presented to the hospital on 12/29/2024 with complaints of left leg weakness, dizziness, and abnormal gait.  He states that he actually suffered a fall  due to his left leg being weak.  BP on arrival 150/100.  Blood glucose 100.  He was found to have an ethanol level of 190.    He underwent a CTA H/N that revealed severe stenosis of the origin of the left VA, occluded right VA origin with reconstitution distally, and resolution of the prior thrombus noted at the V1 segment of the LVA.  His MRI brain revealed no acute intracranial abnormalities, chronic left cerebellar and pontine infarcts noted.  His prior 2D echo in April did not reveal an embolic source for stroke.  He was sent home with a Zio patch at that time however it was never turned in.  Hypercoag panel was also ordered during that admission and revealed an abnormal protein S and beta-2 glycoprotein.     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 Plans for KYLE tomorrow as per primary documentation the patient had already eaten this morning.   - Continue DAPT for now, will determine duration/need to continue once KYLE complete.   Continue Lipitor 80 mg, LDL 77  - Noted hematology has ordered repeat APS studies with plans for follow-up in the outpatient setting for further review.  - Check TSH and B12 levels  - Continue daily p.o. thiamine  - Access center consult for alcohol dependency  - Defer to primary for need for xray for left elbow pain complaints post fall  - Provide smoking cessation resources  Neurochecks per stroke protocol  Slowly normalize BP  Stroke Education  YONY/SCDs  PT/OT/ST  Has f/u already scheduled with Dr. Camacho on 1/14.  Therapies as written. CCP for discharge planning. Call RRT for any acute neurological changes.   We will continue to follow and advise.    Case discussed  with patient via ipad , and Dr. Schmitz, and he agrees with plan above.     Addendum 1248: Patient reportedly not wanting KYLE in the inpatient setting.  From our standpoint we will have him repeat his Zio patch at discharge, discontinue his Plavix as he has been on this since April and continue him on aspirin for now as well as statin for secondary stroke prevention.  Again he has planned follow-up already scheduled on 1/14 in our office.  We will sign off and see again per request.    GABRIELA Dickey

## 2024-12-31 NOTE — CONSULTS
Date of Consultation: 24    Referral Provider: Dr. Schmitz.     Reason for Consultation: Former stroke, TIA, KYLE evaluation.    Encounter Provider: Sushant Garcia MD    Group of Service: [facility]     Patient Name: Stephane Jaimes    :1970    Chief complaint: Left-sided weakness and numbness.    History of Present Illness:      This is a 54 year-old male with a past medical history significant for left cerebellar ischemic stroke in 2024, hypertension, and heavy alcohol use.    He presented to the emergency department on 2024 with complaints of left-sided general weakness and numbness.  The night before he had a fall due to dizziness. He reported that he did hit his head but did not lose consciousness.  He reported that he generally has dizziness and unsteady gait when going up stairs but otherwise doesn't notice it. This has been present since August. He did report that he had 2 shots of liquor prior to arrival. His BAL on arrival was 190.    His brain MRI showed old left cerebellar stroke with no acute finding.  When he had his stroke in April, he had a workup including a hypercoagulable panel and Holter monitor.  His Holter monitor was never returned. His hypercoagulation panel showed elevated beta-2 glycoprotein at 46, protein S functional elevated at 145, protein S antigen free more than 150.  Dr. Quinteros saw the patient, and did not recommend systemic anticoagulation based on this.    I spoke with the patient about a KYLE through a .  He does not have any swallowing issues or noted contraindications to fatigue.  Unfortunately, he ate breakfast already today, and tomorrow is New Year's Day.  He would not be able to get the KYLE at the earliest until 2025 if PACU has availability.    ECHO 2024    Left ventricular systolic function is hyperdynamic (EF > 70%). Calculated left ventricular EF = 70.8%    Left ventricular wall thickness is consistent  with mild concentric hypertrophy.    Left ventricular diastolic function was normal.      Past Medical History:   Diagnosis Date    H/O: CVA (cerebrovascular accident) 2024    H/O strokes    History of prediabetes     Hypertension     Mixed hyperlipidemia          Past Surgical History:   Procedure Laterality Date    CTA HEAD NECK STROKE PROTOCOL           No Known Allergies      No current facility-administered medications on file prior to encounter.     Current Outpatient Medications on File Prior to Encounter   Medication Sig Dispense Refill    amLODIPine (NORVASC) 10 MG tablet Take 1 tablet by mouth Daily. 30 tablet 0    aspirin 81 MG chewable tablet Chew 1 tablet Daily. 30 tablet 0    atorvastatin (LIPITOR) 80 MG tablet Take 1 tablet by mouth Every Night. 30 tablet 0    folic acid (FOLVITE) 1 MG tablet Take 1 tablet by mouth Daily. 30 tablet 0    thiamine (VITAMIN B1) 100 MG tablet Take 1 tablet by mouth Daily. 30 tablet 0    [DISCONTINUED] clopidogrel (PLAVIX) 75 MG tablet Take 1 tablet by mouth Daily. 30 tablet 0         Social History     Socioeconomic History    Marital status: Single   Tobacco Use    Smoking status: Every Day     Current packs/day: 0.25     Types: Cigarettes    Smokeless tobacco: Never   Vaping Use    Vaping status: Never Used   Substance and Sexual Activity    Alcohol use: Yes    Drug use: Never    Sexual activity: Defer         History reviewed. No pertinent family history.    REVIEW OF SYSTEMS:   Pertinent positives are noted in the above.  Otherwise, all other systems were reviewed and are negative.     Objective:     Vitals:    12/30/24 1921 12/30/24 2344 12/31/24 0300 12/31/24 0729   BP: 117/75 107/65 107/65 99/72   BP Location: Right arm Right arm Right arm Right arm   Patient Position: Lying Lying Lying Lying   Pulse: 73 77  58   Resp: 16 16 16 16   Temp: 97.6 °F (36.4 °C) 97.6 °F (36.4 °C) 97.6 °F (36.4 °C) 97.7 °F (36.5 °C)   TempSrc: Oral Oral Oral Oral   SpO2: 94% 94%    "  Weight:       Height:         Body mass index is 27.49 kg/m².  Flowsheet Rows      Flowsheet Row First Filed Value   Admission Height 170.2 cm (67\") Documented at 12/29/2024 1244   Admission Weight 79.6 kg (175 lb 8 oz) Documented at 12/29/2024 1244             General:    No acute distress, alert and oriented x4, pleasant                   Head:    Normocephalic, atraumatic.   Eyes:          Conjunctivae and sclerae normal, no icterus.   Throat:   No oral lesions, no thrush, oral mucosa moist.    Neck:   Supple, trachea midline.   Lungs:     Clear to auscultation bilaterally     Heart:    Regular rhythm and normal rate.  No murmurs, gallops, or rubs noted.   Abdomen:     Soft, non-tender, non-distended, positive bowel sounds.    Extremities:   No clubbing, cyanosis, or edema.     Pulses:   Pulses palpable and equal bilaterally.    Skin:   No bleeding or rash.   Neuro:   Non-focal.  Moves all extremities well.    Psychiatric:   Normal mood and affect.         Lab Review:                Results from last 7 days   Lab Units 12/30/24  0408   SODIUM mmol/L 136   POTASSIUM mmol/L 3.8   CHLORIDE mmol/L 105   CO2 mmol/L 22.7   BUN mg/dL 12   CREATININE mg/dL 0.76   GLUCOSE mg/dL 97   CALCIUM mg/dL 8.7         Results from last 7 days   Lab Units 12/30/24  0408   WBC 10*3/mm3 11.03*   HEMOGLOBIN g/dL 14.7   HEMATOCRIT % 43.9   PLATELETS 10*3/mm3 355     Results from last 7 days   Lab Units 12/30/24  0408   INR  0.97   APTT seconds 26.3     Results from last 7 days   Lab Units 12/29/24  1256   CHOLESTEROL mg/dL 158         Results from last 7 days   Lab Units 12/29/24  1256   CHOLESTEROL mg/dL 158   TRIGLYCERIDES mg/dL 127   HDL CHOL mg/dL 59   LDL CHOL mg/dL 77       EKG (reviewed by me personally):        Assessment:   1.  Possible TIA  2.  History of large left cerebellar CVA in April 2024  3.  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  Heavy " alcohol use and dependency  5.  Ongoing tobacco use  6.  Hypertension  7.  Hyperlipidemia    Plan:       Again, I spoke with the patient through a  earlier today.  There is no contraindication to the KYLE.  However, he had already eaten breakfast this morning, and tomorrow is New Year's Day.  Therefore, the earliest that he would be able to get a KYLE would be on 1/2/2025 if there is availability in the PACU.  I would prefer to do this with anesthesia support and propofol given his alcohol use.    I told the patient that I am more than happy to do the procedure on 1/2/2025.  The alternative would be to try to set this up as an outpatient.  He would like to be discharged (which I also cleared with neurology and spoke to Dr. Sherwood), and have the test in the future in the PACU as an outpatient.  I will send a message to scheduling to try to get this set up.  In the meantime, he will continue on DAPT and statin therapy.    Thank you very much for this consult.    Zachary Garcia MD

## 2024-12-31 NOTE — PLAN OF CARE
Goal Outcome Evaluation:            Patient seen by hem/onc this shift. Plan is to continue with ASA and Plavix therapy along with monitoring lab work and studies. CIWA=0.  used for assessment. Patient is asleep at present time so will hold Valium for now.

## 2024-12-31 NOTE — PLAN OF CARE
Goal Outcome Evaluation:  Plan of Care Reviewed With: patient        Progress: improving  Outcome Evaluation: Pt plans home w/roommates and then outpt PT , pt improved w/gt distance and was aware of his deficits that he was educ on last visit, pt did amb w/assist of 1 but is close to indep , did not introduce an AD as pt should be able to manage w/o and assist of 1 at home, did not use AD at baseline, suhail seated and standing exer for LEs x10, suhail amb ~120ft HHA-1, plans DC soon    Anticipated Discharge Disposition (PT): home with assist, home with outpatient therapy services

## 2025-01-01 NOTE — CASE MANAGEMENT/SOCIAL WORK
Case Management Discharge Note      Final Note: Home, family to transport         Selected Continued Care - Discharged on 12/31/2024 Admission date: 12/29/2024 - Discharge disposition: Home or Self Care      Destination    No services have been selected for the patient.                Durable Medical Equipment    No services have been selected for the patient.                Dialysis/Infusion    No services have been selected for the patient.                Home Medical Care    No services have been selected for the patient.                Therapy    No services have been selected for the patient.                Community Resources    No services have been selected for the patient.                Community & DME    No services have been selected for the patient.                    Transportation Services  Private: Car    Final Discharge Disposition Code: 01 - home or self-care

## 2025-01-01 NOTE — OUTREACH NOTE
Prep Survey      Flowsheet Row Responses   [facility] facility patient discharged from? [facility]   Is LACE score < 7 ? Yes   Eligibility Readm Mgmt   Discharge diagnosis TIA   Does the patient have one of the following disease processes/diagnoses(primary or secondary)? Stroke   Does the patient have Home health ordered? No   Is there a DME ordered? No   Prep survey completed? Yes            JESSICA ONOFRE - Registered Nurse

## 2025-01-02 LAB
APTT SCREEN TO CONFIRM RATIO: 1.18 RATIO (ref 0–1.34)
B2 GLYCOPROT1 IGA SER-ACNC: 46 GPI IGA UNITS (ref 0–25)
B2 GLYCOPROT1 IGG SER-ACNC: <9 GPI IGG UNITS (ref 0–20)
B2 GLYCOPROT1 IGM SER-ACNC: <9 GPI IGM UNITS (ref 0–32)
CARDIOLIPIN IGG SER IA-ACNC: <9 GPL U/ML (ref 0–14)
CARDIOLIPIN IGM SER IA-ACNC: <9 MPL U/ML (ref 0–12)
CONFIRM APTT/NORMAL: 36.5 SEC (ref 0–47.6)
LA 2 SCREEN W REFLEX-IMP: NORMAL
SCREEN APTT: 30.4 SEC (ref 0–43.5)
SCREEN DRVVT: 31 SEC (ref 0–47)
THROMBIN TIME: 18.1 SEC (ref 0–23)

## 2025-01-14 ENCOUNTER — TELEPHONE (OUTPATIENT)
Dept: NEUROLOGY | Facility: CLINIC | Age: 55
End: 2025-01-14

## 2025-01-21 ENCOUNTER — READMISSION MANAGEMENT (OUTPATIENT)
Dept: CALL CENTER | Facility: HOSPITAL | Age: 55
End: 2025-01-21

## 2025-01-21 NOTE — OUTREACH NOTE
Stroke Week 3 Survey      Flowsheet Row Responses   [facility] patient discharged from? [facility]   Does the patient have one of the following disease processes/diagnoses(primary or secondary)? Stroke   Week 3 attempt successful? No   Call start time 1406   Unsuccessful attempts Attempt 2  [all numbers attempted.  Lorene states she hasnt talked with patient.]   Discharge diagnosis TIA   If primary language is not English what is the name and relationship or agency of  used? [employer] #[id number]   Person spoke with today (if not patient) and relationship patient            JESSICA ONOFRE - Registered Nurse

## 2025-03-18 ENCOUNTER — APPOINTMENT (OUTPATIENT)
Dept: GENERAL RADIOLOGY | Facility: HOSPITAL | Age: 55
End: 2025-03-18

## 2025-03-18 ENCOUNTER — HOSPITAL ENCOUNTER (EMERGENCY)
Facility: HOSPITAL | Age: 55
Discharge: LEFT BEFORE DISCHARGE | End: 2025-03-18
Attending: EMERGENCY MEDICINE | Admitting: EMERGENCY MEDICINE

## 2025-03-18 VITALS
OXYGEN SATURATION: 96 % | RESPIRATION RATE: 19 BRPM | DIASTOLIC BLOOD PRESSURE: 89 MMHG | SYSTOLIC BLOOD PRESSURE: 128 MMHG | HEART RATE: 77 BPM | TEMPERATURE: 99 F

## 2025-03-18 DIAGNOSIS — R51.9 ACUTE NONINTRACTABLE HEADACHE, UNSPECIFIED HEADACHE TYPE: Primary | ICD-10-CM

## 2025-03-18 DIAGNOSIS — R07.89 ATYPICAL CHEST PAIN: ICD-10-CM

## 2025-03-18 LAB
ALBUMIN SERPL-MCNC: 4.4 G/DL (ref 3.5–5.2)
ALBUMIN/GLOB SERPL: 1.5 G/DL
ALP SERPL-CCNC: 122 U/L (ref 39–117)
ALT SERPL W P-5'-P-CCNC: 20 U/L (ref 1–41)
ANION GAP SERPL CALCULATED.3IONS-SCNC: 11 MMOL/L (ref 5–15)
AST SERPL-CCNC: 20 U/L (ref 1–40)
BASOPHILS # BLD AUTO: 0.05 10*3/MM3 (ref 0–0.2)
BASOPHILS NFR BLD AUTO: 0.6 % (ref 0–1.5)
BILIRUB SERPL-MCNC: 0.3 MG/DL (ref 0–1.2)
BUN SERPL-MCNC: 6 MG/DL (ref 6–20)
BUN/CREAT SERPL: 7.4 (ref 7–25)
CALCIUM SPEC-SCNC: 8.8 MG/DL (ref 8.6–10.5)
CHLORIDE SERPL-SCNC: 109 MMOL/L (ref 98–107)
CO2 SERPL-SCNC: 24 MMOL/L (ref 22–29)
CREAT SERPL-MCNC: 0.81 MG/DL (ref 0.76–1.27)
DEPRECATED RDW RBC AUTO: 45.6 FL (ref 37–54)
EGFRCR SERPLBLD CKD-EPI 2021: 106.1 ML/MIN/1.73
EOSINOPHIL # BLD AUTO: 0.18 10*3/MM3 (ref 0–0.4)
EOSINOPHIL NFR BLD AUTO: 2.2 % (ref 0.3–6.2)
ERYTHROCYTE [DISTWIDTH] IN BLOOD BY AUTOMATED COUNT: 13.1 % (ref 12.3–15.4)
ETHANOL BLD-MCNC: 210 MG/DL (ref 0–10)
ETHANOL UR QL: 0.21 %
GLOBULIN UR ELPH-MCNC: 2.9 GM/DL
GLUCOSE SERPL-MCNC: 95 MG/DL (ref 65–99)
HCT VFR BLD AUTO: 47.1 % (ref 37.5–51)
HGB BLD-MCNC: 15.9 G/DL (ref 13–17.7)
IMM GRANULOCYTES # BLD AUTO: 0.07 10*3/MM3 (ref 0–0.05)
IMM GRANULOCYTES NFR BLD AUTO: 0.8 % (ref 0–0.5)
LYMPHOCYTES # BLD AUTO: 2.69 10*3/MM3 (ref 0.7–3.1)
LYMPHOCYTES NFR BLD AUTO: 32.5 % (ref 19.6–45.3)
MCH RBC QN AUTO: 31.9 PG (ref 26.6–33)
MCHC RBC AUTO-ENTMCNC: 33.8 G/DL (ref 31.5–35.7)
MCV RBC AUTO: 94.6 FL (ref 79–97)
MONOCYTES # BLD AUTO: 0.58 10*3/MM3 (ref 0.1–0.9)
MONOCYTES NFR BLD AUTO: 7 % (ref 5–12)
NEUTROPHILS NFR BLD AUTO: 4.7 10*3/MM3 (ref 1.7–7)
NEUTROPHILS NFR BLD AUTO: 56.9 % (ref 42.7–76)
NRBC BLD AUTO-RTO: 0 /100 WBC (ref 0–0.2)
PLATELET # BLD AUTO: 322 10*3/MM3 (ref 140–450)
PMV BLD AUTO: 9 FL (ref 6–12)
POTASSIUM SERPL-SCNC: 3.5 MMOL/L (ref 3.5–5.2)
PROT SERPL-MCNC: 7.3 G/DL (ref 6–8.5)
RBC # BLD AUTO: 4.98 10*6/MM3 (ref 4.14–5.8)
SODIUM SERPL-SCNC: 144 MMOL/L (ref 136–145)
TROPONIN T SERPL HS-MCNC: <6 NG/L
WBC NRBC COR # BLD AUTO: 8.27 10*3/MM3 (ref 3.4–10.8)

## 2025-03-18 PROCEDURE — 93005 ELECTROCARDIOGRAM TRACING: CPT | Performed by: EMERGENCY MEDICINE

## 2025-03-18 PROCEDURE — 80053 COMPREHEN METABOLIC PANEL: CPT | Performed by: EMERGENCY MEDICINE

## 2025-03-18 PROCEDURE — 93010 ELECTROCARDIOGRAM REPORT: CPT | Performed by: INTERNAL MEDICINE

## 2025-03-18 PROCEDURE — 71045 X-RAY EXAM CHEST 1 VIEW: CPT

## 2025-03-18 PROCEDURE — 82077 ASSAY SPEC XCP UR&BREATH IA: CPT | Performed by: EMERGENCY MEDICINE

## 2025-03-18 PROCEDURE — 99284 EMERGENCY DEPT VISIT MOD MDM: CPT

## 2025-03-18 PROCEDURE — 84484 ASSAY OF TROPONIN QUANT: CPT | Performed by: EMERGENCY MEDICINE

## 2025-03-18 PROCEDURE — 85025 COMPLETE CBC W/AUTO DIFF WBC: CPT | Performed by: EMERGENCY MEDICINE

## 2025-03-18 RX ORDER — SODIUM CHLORIDE 0.9 % (FLUSH) 0.9 %
10 SYRINGE (ML) INJECTION AS NEEDED
Status: DISCONTINUED | OUTPATIENT
Start: 2025-03-18 | End: 2025-03-18 | Stop reason: HOSPADM

## 2025-03-18 NOTE — ED PROVIDER NOTES
EMERGENCY DEPARTMENT ENCOUNTER    Room Number:  31/31  PCP: Anika Banuelos APRN  Historian: Patient, EMS    I initially evaluated the patient at 1919    HPI:  Chief Complaint: Chest pain  A complete HPI/ROS/PMH/PSH/SH/FH are unobtainable due to: Nothing  Context: Stephane Jaimes is a 54 y.o. male with a medical history of CVA, hypertension, hyperlipidemia who presents to the ED c/o acute chest pain that began about 5 hours ago.  Pain is constant and dull.  Pain does not radiate.  Nothing makes the pain better or worse.  Pain is not related to exertion.  He has had some shortness of breath but denies nausea, vomiting, sweating, palpitations, dizziness, cough, fever, leg pain, or leg swelling.  He also complains of a mild generalized headache.  Denies recent head injury or new numbness/tingling/weakness in his extremities.            PAST MEDICAL HISTORY  Active Ambulatory Problems     Diagnosis Date Noted    Alcohol intoxication 04/07/2024    Transaminitis 04/07/2024    HTN (hypertension) 04/08/2024    Cerebellar stroke 04/10/2024    Cerebral edema 04/10/2024    TIA (transient ischemic attack) 12/29/2024     Resolved Ambulatory Problems     Diagnosis Date Noted    No Resolved Ambulatory Problems     Past Medical History:   Diagnosis Date    H/O: CVA (cerebrovascular accident) 2024    History of prediabetes     Hypertension     Mixed hyperlipidemia          PAST SURGICAL HISTORY  Past Surgical History:   Procedure Laterality Date    CTA HEAD NECK STROKE PROTOCOL           FAMILY HISTORY  History reviewed. No pertinent family history.      SOCIAL HISTORY  Social History     Socioeconomic History    Marital status: Single   Tobacco Use    Smoking status: Every Day     Current packs/day: 0.25     Types: Cigarettes    Smokeless tobacco: Never   Vaping Use    Vaping status: Never Used   Substance and Sexual Activity    Alcohol use: Yes    Drug use: Never    Sexual activity: Defer         ALLERGIES  Patient has no  known allergies.    REVIEW OF SYSTEMS  Review of Systems  Included in HPI  All systems reviewed and negative except for those discussed in HPI.      PHYSICAL EXAM  ED Triage Vitals [03/18/25 1906]   Temp Heart Rate Resp BP SpO2   99 °F (37.2 °C) 81 19 127/87 99 %      Temp src Heart Rate Source Patient Position BP Location FiO2 (%)   -- -- -- -- --       Physical Exam      GENERAL: Awake and alert.  Nontoxic-appearing male.  Resting comfortably in no acute distress  HENT: NCAT, nares patent  EYES: no scleral icterus  CV: regular rhythm, normal rate, equal bilateral radial pulses  RESPIRATORY: normal effort, clear to auscultation bilaterally  ABDOMEN: soft, nontender  MUSCULOSKELETAL: Extremities are nontender with full range of motion.  Neck is supple.  No calf tenderness.  No pedal edema  NEURO: Speech is normal.  No facial droop.  Follows commands.  Normal strength in all extremities  PSYCH:  calm, cooperative  SKIN: warm, dry    Vital signs and nursing notes reviewed.          LAB RESULTS  Recent Results (from the past 24 hours)   Comprehensive Metabolic Panel    Collection Time: 03/18/25  7:33 PM    Specimen: Blood   Result Value Ref Range    Glucose 95 65 - 99 mg/dL    BUN 6 6 - 20 mg/dL    Creatinine 0.81 0.76 - 1.27 mg/dL    Sodium 144 136 - 145 mmol/L    Potassium 3.5 3.5 - 5.2 mmol/L    Chloride 109 (H) 98 - 107 mmol/L    CO2 24.0 22.0 - 29.0 mmol/L    Calcium 8.8 8.6 - 10.5 mg/dL    Total Protein 7.3 6.0 - 8.5 g/dL    Albumin 4.4 3.5 - 5.2 g/dL    ALT (SGPT) 20 1 - 41 U/L    AST (SGOT) 20 1 - 40 U/L    Alkaline Phosphatase 122 (H) 39 - 117 U/L    Total Bilirubin 0.3 0.0 - 1.2 mg/dL    Globulin 2.9 gm/dL    A/G Ratio 1.5 g/dL    BUN/Creatinine Ratio 7.4 7.0 - 25.0    Anion Gap 11.0 5.0 - 15.0 mmol/L    eGFR 106.1 >60.0 mL/min/1.73   High Sensitivity Troponin T    Collection Time: 03/18/25  7:33 PM    Specimen: Blood   Result Value Ref Range    HS Troponin T <6 <22 ng/L   Ethanol    Collection Time: 03/18/25   7:33 PM    Specimen: Blood   Result Value Ref Range    Ethanol 210 (H) 0 - 10 mg/dL    Ethanol % 0.210 %   CBC Auto Differential    Collection Time: 03/18/25  7:33 PM    Specimen: Blood   Result Value Ref Range    WBC 8.27 3.40 - 10.80 10*3/mm3    RBC 4.98 4.14 - 5.80 10*6/mm3    Hemoglobin 15.9 13.0 - 17.7 g/dL    Hematocrit 47.1 37.5 - 51.0 %    MCV 94.6 79.0 - 97.0 fL    MCH 31.9 26.6 - 33.0 pg    MCHC 33.8 31.5 - 35.7 g/dL    RDW 13.1 12.3 - 15.4 %    RDW-SD 45.6 37.0 - 54.0 fl    MPV 9.0 6.0 - 12.0 fL    Platelets 322 140 - 450 10*3/mm3    Neutrophil % 56.9 42.7 - 76.0 %    Lymphocyte % 32.5 19.6 - 45.3 %    Monocyte % 7.0 5.0 - 12.0 %    Eosinophil % 2.2 0.3 - 6.2 %    Basophil % 0.6 0.0 - 1.5 %    Immature Grans % 0.8 (H) 0.0 - 0.5 %    Neutrophils, Absolute 4.70 1.70 - 7.00 10*3/mm3    Lymphocytes, Absolute 2.69 0.70 - 3.10 10*3/mm3    Monocytes, Absolute 0.58 0.10 - 0.90 10*3/mm3    Eosinophils, Absolute 0.18 0.00 - 0.40 10*3/mm3    Basophils, Absolute 0.05 0.00 - 0.20 10*3/mm3    Immature Grans, Absolute 0.07 (H) 0.00 - 0.05 10*3/mm3    nRBC 0.0 0.0 - 0.2 /100 WBC   ECG 12 Lead Chest Pain    Collection Time: 03/18/25  7:33 PM   Result Value Ref Range    QT Interval 408 ms    QTC Interval 458 ms       Ordered the above labs and reviewed the results.        RADIOLOGY  XR Chest 1 View  Result Date: 3/18/2025  XR CHEST 1 VW-   INDICATION: Chest pain, shortness of breath  COMPARISON: Chest radiograph April 6, 2024  TECHNIQUE: 1 view chest  FINDINGS:  Low lung volumes. No focal opacity. No effusions. Stable mediastinum. Heart is normal in size.      No acute cardiopulmonary process  This report was finalized on 3/18/2025 8:01 PM by Dr. Ramone Galvan M.D on Workstation: [id number]        Ordered the above noted radiological studies. Reviewed by me in PACS.            PROCEDURES  Procedures        OUTPATIENT MEDICATION MANAGEMENT:  Current Facility-Administered Medications Ordered in Epic   Medication Dose  Route Frequency Provider Last Rate Last Admin    sodium chloride 0.9 % flush 10 mL  10 mL Intravenous PRN Vahe Mitchell MD         Current Outpatient Medications Ordered in Epic   Medication Sig Dispense Refill    amLODIPine (NORVASC) 10 MG tablet Take 1 tablet by mouth Daily. 30 tablet 0    aspirin 81 MG chewable tablet Chew 1 tablet Daily. 30 tablet 0    atorvastatin (LIPITOR) 80 MG tablet Take 1 tablet by mouth Every Night. 30 tablet 0    folic acid (FOLVITE) 1 MG tablet Take 1 tablet by mouth Daily. 30 tablet 0    thiamine (VITAMIN B1) 100 MG tablet Take 1 tablet by mouth Daily. 30 tablet 0           MEDICATIONS GIVEN IN ER  Medications   sodium chloride 0.9 % flush 10 mL (has no administration in time range)                   MEDICAL DECISION MAKING, PROGRESS, and CONSULTS    All labs have been independently reviewed by me.  All radiology studies have been reviewed by me and I have also reviewed the radiology report.   EKG's independently viewed and interpreted by me.  Discussion below represents my analysis of pertinent findings related to patient's condition, differential diagnosis, treatment plan and final disposition.      Additional sources:    - Discussed/ obtained information from independent historians: EMS    - External (non-ED) record review: Patient was admitted here in December 2024 for a TIA.  Brain MRI showed an old left cerebellum CVA but no acute findings.  Echocardiogram done in April 2024 showed an EF of greater than 70%    -Prescription drug monitoring program review:     N/A    - Chronic or social conditions impacting patient care (Social Determinants of Health): None and Health Care System (Health Coverage, Provider Availability, Provider Linguistic and Cultural Competency, Quality of Care) patient does not currently have a primary care provider          Orders placed during this visit:  Orders Placed This Encounter   Procedures    XR Chest 1 View    Comprehensive Metabolic Panel     High Sensitivity Troponin T    Ethanol    CBC Auto Differential    High Sensitivity Troponin T 1Hr    Monitor Blood Pressure    Continuous Pulse Oximetry    ECG 12 Lead Chest Pain    Insert Peripheral IV    CBC & Differential         Additional orders considered but not ordered:          Differential diagnosis includes, but is not limited to:    DDx includes but is not limited to acute coronary syndrome, pulmonary embolism, thoracic aortic dissection, pneumonia, pneumothorax, musculoskeletal pain, GERD or esophageal spasm, anxiety, myocarditis/pericarditis, esophageal rupture, pancreatitis.                Independent interpretation of labs, radiology studies, and discussions with consultants:  ED Course as of 03/18/25 2228   Tue Mar 18, 2025   1918 BP: 127/87 [WH]   1918 Temp: 99 °F (37.2 °C) [WH]   1918 Heart Rate: 81 [WH]   1918 Resp: 19 [WH]   1918 SpO2: 99 % []   1943 EKG personally interpreted by me at 1936.  My personal interpretation is:          EKG time: 1933  Rhythm/Rate: Sinus rhythm, rate 76  P waves and TX: Normal  QRS, axis: LAD  ST and T waves: Normal    Interpreted Contemporaneously by me, independently viewed  EKG is not significantly changed compared to prior EKG done on 12/29/2024   [WH]   2012 WBC: 8.27 [WH]   2012 Hemoglobin: 15.9 [WH]   2012 Chest x-ray personally interpreted by me.  My personal trepidation is: Heart size is normal.  Lungs are clear.  No pleural effusion. [WH]   2033 Patient told JANESSA Stratton, that he was feeling better and was going to leave.  He then signed out AMA before his workup was complete. [WH]   2047 MDM: Patient presented to the ED complaining of headache and chest pain.  His chest pain was atypical.  EKG did not have acute ischemic changes.  Initial troponin was negative.  Chest x-ray was unremarkable.  Patient signed out AMA before his workup was complete.  Alcohol level was 210 but the patient did not appear intoxicated.  He had a steady gait. [WH]      ED Course  User Index  [WH] Vahe Mitchell MD         COMPLEXITY OF CARE        DIAGNOSIS  Final diagnoses:   Acute nonintractable headache, unspecified headache type   Atypical chest pain         DISPOSITION  AMA            Latest Documented Vital Signs:  AS OF 22:28 EDT VITALS:    BP - 128/89  HR - 77  TEMP - 99 °F (37.2 °C)  O2 SATS - 96%            --    Please note that portions of this were completed with a voice recognition program.       Note Disclaimer: At [facility], we believe that sharing information builds trust and better relationships. You are receiving this note because you are receiving care at [facility] or recently visited. It is possible you will see health information before a provider has talked with you about it. This kind of information can be easy to misunderstand. To help you fully understand what it means for your health, we urge you to discuss this note with your provider.             Vahe Mitchell MD  03/18/25 4052

## 2025-03-19 LAB
QT INTERVAL: 408 MS
QTC INTERVAL: 458 MS